# Patient Record
Sex: MALE | Race: WHITE | Employment: OTHER | URBAN - METROPOLITAN AREA
[De-identification: names, ages, dates, MRNs, and addresses within clinical notes are randomized per-mention and may not be internally consistent; named-entity substitution may affect disease eponyms.]

---

## 2017-01-10 ENCOUNTER — TELEPHONE (OUTPATIENT)
Dept: FAMILY MEDICINE CLINIC | Facility: CLINIC | Age: 57
End: 2017-01-10

## 2017-01-10 DIAGNOSIS — E55.9 VITAMIN D DEFICIENCY: Primary | ICD-10-CM

## 2017-01-10 DIAGNOSIS — E78.5 HYPERLIPIDEMIA WITH TARGET LDL LESS THAN 130: ICD-10-CM

## 2017-01-10 DIAGNOSIS — I10 HYPERTENSION, BENIGN ESSENTIAL, GOAL BELOW 140/90: ICD-10-CM

## 2017-01-10 NOTE — TELEPHONE ENCOUNTER
Patient schedule an appointment on 2/27/17, would like labs placed at THE [facility].

## 2017-01-10 NOTE — TELEPHONE ENCOUNTER
Pt has scheduled six month follow up for 2/27/16 for BP. But Pt asked for lab orders. LOV 06/06/16 and labs 06/08/16. What labs would you advise Pt to complete ? Routed to Dr Lor Slade.

## 2017-02-18 ENCOUNTER — APPOINTMENT (OUTPATIENT)
Dept: LAB | Age: 57
End: 2017-02-18
Attending: FAMILY MEDICINE
Payer: COMMERCIAL

## 2017-02-18 DIAGNOSIS — E78.5 HYPERLIPIDEMIA WITH TARGET LDL LESS THAN 130: ICD-10-CM

## 2017-02-18 DIAGNOSIS — I10 HYPERTENSION, BENIGN ESSENTIAL, GOAL BELOW 140/90: ICD-10-CM

## 2017-02-18 DIAGNOSIS — E55.9 VITAMIN D DEFICIENCY: ICD-10-CM

## 2017-02-18 LAB
25-HYDROXYVITAMIN D (TOTAL): 31.1 NG/ML (ref 30–100)
ALBUMIN SERPL-MCNC: 3.7 G/DL (ref 3.5–4.8)
ALP LIVER SERPL-CCNC: 90 U/L (ref 45–117)
ALT SERPL-CCNC: 29 U/L (ref 17–63)
AST SERPL-CCNC: 16 U/L (ref 15–41)
BILIRUB SERPL-MCNC: 0.5 MG/DL (ref 0.1–2)
BUN BLD-MCNC: 18 MG/DL (ref 8–20)
CALCIUM BLD-MCNC: 8.9 MG/DL (ref 8.3–10.3)
CHLORIDE: 105 MMOL/L (ref 101–111)
CHOLEST SMN-MCNC: 181 MG/DL (ref ?–200)
CO2: 26 MMOL/L (ref 22–32)
CREAT BLD-MCNC: 0.88 MG/DL (ref 0.7–1.3)
GLUCOSE BLD-MCNC: 94 MG/DL (ref 70–99)
HDLC SERPL-MCNC: 66 MG/DL (ref 45–?)
HDLC SERPL: 2.74 {RATIO} (ref ?–4.97)
LDLC SERPL CALC-MCNC: 100 MG/DL (ref ?–130)
M PROTEIN MFR SERPL ELPH: 7 G/DL (ref 6.1–8.3)
NONHDLC SERPL-MCNC: 115 MG/DL (ref ?–130)
POTASSIUM SERPL-SCNC: 3.8 MMOL/L (ref 3.6–5.1)
SODIUM SERPL-SCNC: 141 MMOL/L (ref 136–144)
TRIGLYCERIDES: 75 MG/DL (ref ?–150)
VLDL: 15 MG/DL (ref 5–40)

## 2017-02-18 PROCEDURE — 80053 COMPREHEN METABOLIC PANEL: CPT

## 2017-02-18 PROCEDURE — 82306 VITAMIN D 25 HYDROXY: CPT

## 2017-02-18 PROCEDURE — 80061 LIPID PANEL: CPT

## 2017-02-27 ENCOUNTER — OFFICE VISIT (OUTPATIENT)
Dept: FAMILY MEDICINE CLINIC | Facility: CLINIC | Age: 57
End: 2017-02-27

## 2017-02-27 VITALS
SYSTOLIC BLOOD PRESSURE: 126 MMHG | RESPIRATION RATE: 16 BRPM | TEMPERATURE: 98 F | HEART RATE: 72 BPM | BODY MASS INDEX: 34.71 KG/M2 | WEIGHT: 229 LBS | HEIGHT: 68 IN | DIASTOLIC BLOOD PRESSURE: 80 MMHG

## 2017-02-27 DIAGNOSIS — M17.0 PRIMARY OSTEOARTHRITIS OF BOTH KNEES: ICD-10-CM

## 2017-02-27 DIAGNOSIS — I10 HYPERTENSION, BENIGN ESSENTIAL, GOAL BELOW 140/90: ICD-10-CM

## 2017-02-27 DIAGNOSIS — E78.5 HYPERLIPIDEMIA WITH TARGET LDL LESS THAN 130: Primary | ICD-10-CM

## 2017-02-27 PROCEDURE — 99214 OFFICE O/P EST MOD 30 MIN: CPT | Performed by: FAMILY MEDICINE

## 2017-02-28 NOTE — PROGRESS NOTES
HPI:   Patient presents with:  Blood Pressure: 6 month f/u   Hyperlipidemia: 6 month f/u       Selmer Suzan. is a 64year old male who presents for recheck of his hypertension.  He has been taking medications as instructed, with no medication side effec current facility-administered medications on file prior to visit. Past History:   He has Vitamin D deficiency;  Hyperlipidemia with target LDL less than 130; Hypertension, benign essential, goal below 140/90; and Primary osteoarthritis of both knees on hi ordered    Problem List Items Addressed This Visit        Cardiovascular    Hyperlipidemia with target LDL less than 130 - Primary    Overview     LDL  with TLC         Hypertension, benign essential, goal below 140/90    Overview     Lisinopril 10

## 2017-02-28 NOTE — ASSESSMENT & PLAN NOTE
He questions about [place] injections versus other options.   He seen Dr. Dani Elaine in the past, we discussed that it may be appropriate for this since he still having pain

## 2017-05-03 ENCOUNTER — TELEPHONE (OUTPATIENT)
Dept: FAMILY MEDICINE CLINIC | Facility: CLINIC | Age: 57
End: 2017-05-03

## 2017-05-03 DIAGNOSIS — I10 HYPERTENSION, BENIGN ESSENTIAL, GOAL BELOW 140/90: Primary | ICD-10-CM

## 2017-05-03 DIAGNOSIS — Z00.00 LABORATORY EXAMINATION ORDERED AS PART OF A ROUTINE GENERAL MEDICAL EXAMINATION: Primary | ICD-10-CM

## 2017-05-03 RX ORDER — LISINOPRIL 10 MG/1
10 TABLET ORAL DAILY
Qty: 90 TABLET | Refills: 3 | Status: SHIPPED | OUTPATIENT
Start: 2017-05-03 | End: 2017-08-28

## 2017-05-03 NOTE — TELEPHONE ENCOUNTER
Pt scheduled a physical for August and needs blood work order sent to [facility].

## 2017-06-22 ENCOUNTER — HOSPITAL ENCOUNTER (OUTPATIENT)
Age: 57
Discharge: HOME | End: 2017-06-22
Attending: EMERGENCY MEDICINE
Payer: COMMERCIAL

## 2017-06-22 ENCOUNTER — TELEPHONE (OUTPATIENT)
Dept: FAMILY MEDICINE CLINIC | Facility: CLINIC | Age: 57
End: 2017-06-22

## 2017-06-22 VITALS
BODY MASS INDEX: 36 KG/M2 | DIASTOLIC BLOOD PRESSURE: 88 MMHG | WEIGHT: 235 LBS | OXYGEN SATURATION: 96 % | TEMPERATURE: 97 F | RESPIRATION RATE: 16 BRPM | HEART RATE: 86 BPM | SYSTOLIC BLOOD PRESSURE: 144 MMHG

## 2017-06-22 DIAGNOSIS — L03.116 LEFT LEG CELLULITIS: Primary | ICD-10-CM

## 2017-06-22 PROCEDURE — 99203 OFFICE O/P NEW LOW 30 MIN: CPT

## 2017-06-22 PROCEDURE — 99213 OFFICE O/P EST LOW 20 MIN: CPT

## 2017-06-22 RX ORDER — CEFADROXIL 500 MG/1
500 CAPSULE ORAL 2 TIMES DAILY
Qty: 20 CAPSULE | Refills: 0 | Status: SHIPPED | OUTPATIENT
Start: 2017-06-22 | End: 2017-07-02

## 2017-06-22 RX ORDER — IBUPROFEN 600 MG/1
600 TABLET ORAL EVERY 8 HOURS PRN
Qty: 30 TABLET | Refills: 0 | Status: SHIPPED | OUTPATIENT
Start: 2017-06-22 | End: 2017-06-29

## 2017-06-22 NOTE — ED INITIAL ASSESSMENT (HPI)
Pt c/o redness, warmth, swelling and sensitive to touch on left inner thigh. Pt denies any known injury.

## 2017-06-22 NOTE — ED PROVIDER NOTES
Patient Seen in: [facility]    History   Patient presents with:  Cellulitis (integumentary, infectious)    Stated Complaint: red spot on right leg ,tender    HPI    41-year-old male presents to the emergency department comp are as noted in HPI. Constitutional and vital signs reviewed. All other systems reviewed and negative except as noted above. PSFH elements reviewed from today and agreed except as otherwise stated in HPI.     Physical Exam     ED Triage Vitals   BP 0

## 2017-06-22 NOTE — TELEPHONE ENCOUNTER
Talked to patient he has had this red area to inner aspect of thigh for last week no HX of injury area feels red and the tender area starts at knee and extends 9 in getting more intense to touch urged patient to go to Urgent care to be seen today he was re

## 2017-08-23 ENCOUNTER — LABORATORY ENCOUNTER (OUTPATIENT)
Dept: LAB | Age: 57
End: 2017-08-23
Attending: FAMILY MEDICINE
Payer: COMMERCIAL

## 2017-08-23 DIAGNOSIS — Z00.00 LABORATORY EXAMINATION ORDERED AS PART OF A ROUTINE GENERAL MEDICAL EXAMINATION: ICD-10-CM

## 2017-08-23 LAB
ALBUMIN SERPL-MCNC: 3.4 G/DL (ref 3.5–4.8)
ALP LIVER SERPL-CCNC: 83 U/L (ref 45–117)
ALT SERPL-CCNC: 26 U/L (ref 17–63)
AST SERPL-CCNC: 14 U/L (ref 15–41)
BASOPHILS # BLD AUTO: 0.08 X10(3) UL (ref 0–0.1)
BASOPHILS NFR BLD AUTO: 0.8 %
BILIRUB SERPL-MCNC: 0.6 MG/DL (ref 0.1–2)
BUN BLD-MCNC: 20 MG/DL (ref 8–20)
CALCIUM BLD-MCNC: 9.1 MG/DL (ref 8.3–10.3)
CHLORIDE: 103 MMOL/L (ref 101–111)
CHOLEST SMN-MCNC: 185 MG/DL (ref ?–200)
CO2: 27 MMOL/L (ref 22–32)
COMPLEXED PSA SERPL-MCNC: 4.82 NG/ML (ref 0.01–4)
CREAT BLD-MCNC: 1.01 MG/DL (ref 0.7–1.3)
EOSINOPHIL # BLD AUTO: 0.49 X10(3) UL (ref 0–0.3)
EOSINOPHIL NFR BLD AUTO: 4.9 %
ERYTHROCYTE [DISTWIDTH] IN BLOOD BY AUTOMATED COUNT: 14 % (ref 11.5–16)
GLUCOSE BLD-MCNC: 98 MG/DL (ref 70–99)
HCT VFR BLD AUTO: 48.3 % (ref 37–53)
HDLC SERPL-MCNC: 59 MG/DL (ref 45–?)
HDLC SERPL: 3.14 {RATIO} (ref ?–4.97)
HGB BLD-MCNC: 16.2 G/DL (ref 13–17)
IMMATURE GRANULOCYTE COUNT: 0.06 X10(3) UL (ref 0–1)
IMMATURE GRANULOCYTE RATIO %: 0.6 %
LDLC SERPL CALC-MCNC: 101 MG/DL (ref ?–130)
LDLC SERPL-MCNC: 25 MG/DL (ref 5–40)
LYMPHOCYTES # BLD AUTO: 4.07 X10(3) UL (ref 0.9–4)
LYMPHOCYTES NFR BLD AUTO: 40.5 %
M PROTEIN MFR SERPL ELPH: 6.9 G/DL (ref 6.1–8.3)
MCH RBC QN AUTO: 31.3 PG (ref 27–33.2)
MCHC RBC AUTO-ENTMCNC: 33.5 G/DL (ref 31–37)
MCV RBC AUTO: 93.4 FL (ref 80–99)
MONOCYTES # BLD AUTO: 1.11 X10(3) UL (ref 0.1–0.6)
MONOCYTES NFR BLD AUTO: 11 %
NEUTROPHIL ABS PRELIM: 4.25 X10 (3) UL (ref 1.3–6.7)
NEUTROPHILS # BLD AUTO: 4.25 X10(3) UL (ref 1.3–6.7)
NEUTROPHILS NFR BLD AUTO: 42.2 %
NONHDLC SERPL-MCNC: 126 MG/DL (ref ?–130)
PLATELET # BLD AUTO: 297 10(3)UL (ref 150–450)
POTASSIUM SERPL-SCNC: 4.6 MMOL/L (ref 3.6–5.1)
RBC # BLD AUTO: 5.17 X10(6)UL (ref 4.3–5.7)
RED CELL DISTRIBUTION WIDTH-SD: 48.3 FL (ref 35.1–46.3)
SODIUM SERPL-SCNC: 136 MMOL/L (ref 136–144)
TRIGLYCERIDES: 127 MG/DL (ref ?–150)
TSI SER-ACNC: 2.89 MIU/ML (ref 0.35–5.5)
WBC # BLD AUTO: 10.1 X10(3) UL (ref 4–13)

## 2017-08-23 PROCEDURE — 80061 LIPID PANEL: CPT | Performed by: FAMILY MEDICINE

## 2017-08-23 PROCEDURE — 84153 ASSAY OF PSA TOTAL: CPT | Performed by: FAMILY MEDICINE

## 2017-08-23 PROCEDURE — 36415 COLL VENOUS BLD VENIPUNCTURE: CPT | Performed by: FAMILY MEDICINE

## 2017-08-23 PROCEDURE — 80050 GENERAL HEALTH PANEL: CPT | Performed by: FAMILY MEDICINE

## 2017-08-28 ENCOUNTER — OFFICE VISIT (OUTPATIENT)
Dept: FAMILY MEDICINE CLINIC | Facility: CLINIC | Age: 57
End: 2017-08-28

## 2017-08-28 VITALS
HEIGHT: 67.75 IN | RESPIRATION RATE: 16 BRPM | HEART RATE: 80 BPM | SYSTOLIC BLOOD PRESSURE: 110 MMHG | WEIGHT: 233 LBS | BODY MASS INDEX: 35.73 KG/M2 | DIASTOLIC BLOOD PRESSURE: 60 MMHG

## 2017-08-28 DIAGNOSIS — I10 HYPERTENSION, BENIGN ESSENTIAL, GOAL BELOW 140/90: ICD-10-CM

## 2017-08-28 DIAGNOSIS — E78.5 HYPERLIPIDEMIA WITH TARGET LDL LESS THAN 130: ICD-10-CM

## 2017-08-28 DIAGNOSIS — Z00.00 ANNUAL PHYSICAL EXAM: Primary | ICD-10-CM

## 2017-08-28 DIAGNOSIS — R97.20 ELEVATED PSA, LESS THAN 10 NG/ML: ICD-10-CM

## 2017-08-28 PROCEDURE — 99396 PREV VISIT EST AGE 40-64: CPT | Performed by: FAMILY MEDICINE

## 2017-08-28 RX ORDER — LISINOPRIL 10 MG/1
10 TABLET ORAL DAILY
Qty: 90 TABLET | Refills: 3 | Status: SHIPPED | OUTPATIENT
Start: 2017-08-28 | End: 2018-08-23

## 2017-08-28 NOTE — PROGRESS NOTES
Cheyenne Mares. is a 62year old male who presents for a complete physical exam.   HPI:   Patient presents with:  Physical  Lab Results      His last annual assessment has been over 1 year: Annual Physical due on 12/29/2015        Pt complains of OA of l 08:40 AM         Lab Results  Component Value Date/Time   VITD 31.1 02/18/2017 11:54 AM   PSA 0.45 05/06/2011 05:53 PM              Current Outpatient Prescriptions on File Prior to Visit:  Cholecalciferol (VITAMIN D) 1000 UNITS Oral Cap Take 2,000 Int'l U 110/60 (BP Location: Left arm)   Pulse 80   Resp 16   Ht 67.75\"   Wt 233 lb   BMI 35.69 kg/m²  Estimated body mass index is 35.69 kg/m² as calculated from the following:    Height as of this encounter: 67.75\". Weight as of this encounter: 233 lb.    Ph range of motion. Lymphadenopathy:     He has no cervical adenopathy. He has no axillary adenopathy. Neurological: He is alert and oriented to person, place, and time. He has normal strength and normal reflexes.  No cranial nerve deficit or sensory d large prostate, repeat PSA with free level in 1 month, follow up if abnormal, discussed aovodart and options.           Relevant Orders    PSA TOTAL W REFLEX TO FREE      Other Visit Diagnoses     Annual physical exam    -  Primary         Return in about 6

## 2017-08-29 ENCOUNTER — TELEPHONE (OUTPATIENT)
Dept: FAMILY MEDICINE CLINIC | Facility: CLINIC | Age: 57
End: 2017-08-29

## 2017-08-29 NOTE — TELEPHONE ENCOUNTER
Patient had called [facility], Dr. Shabbir Bermudez, yesterday to schedule his colonoscopy and they are unable to get him in until January and patient is wanting to know if we can call to try and get him in this year

## 2017-08-29 NOTE — TELEPHONE ENCOUNTER
Called Dr Lalitha Galan are booking out until Jan of 2018 Dr Phillip Arguello has an appointment for 10/16 and Dr Nancy Smith has an appointment for 10/19.  Called patient no answer no voice mail sent My Chart message

## 2017-08-29 NOTE — TELEPHONE ENCOUNTER
Patient given the below information and is wondering which of the below two doctors Dr. Joe Darby would recommend?     Routed to Dr. Joe Darby

## 2017-08-29 NOTE — ASSESSMENT & PLAN NOTE
Likely BPH with large prostate, repeat PSA with free level in 1 month, follow up if abnormal, discussed aovodart and options.

## 2017-10-07 ENCOUNTER — APPOINTMENT (OUTPATIENT)
Dept: LAB | Age: 57
End: 2017-10-07
Attending: FAMILY MEDICINE
Payer: COMMERCIAL

## 2017-10-07 DIAGNOSIS — R97.20 ELEVATED PSA, LESS THAN 10 NG/ML: ICD-10-CM

## 2017-10-07 PROCEDURE — 84153 ASSAY OF PSA TOTAL: CPT

## 2017-10-07 PROCEDURE — 36415 COLL VENOUS BLD VENIPUNCTURE: CPT

## 2017-10-09 ENCOUNTER — LAB ENCOUNTER (OUTPATIENT)
Dept: LAB | Age: 57
End: 2017-10-09
Attending: FAMILY MEDICINE
Payer: COMMERCIAL

## 2017-10-09 DIAGNOSIS — R97.20 ELEVATED PSA: Primary | ICD-10-CM

## 2017-10-09 PROCEDURE — 36415 COLL VENOUS BLD VENIPUNCTURE: CPT | Performed by: FAMILY MEDICINE

## 2017-10-09 PROCEDURE — 84153 ASSAY OF PSA TOTAL: CPT | Performed by: FAMILY MEDICINE

## 2017-10-16 ENCOUNTER — TELEPHONE (OUTPATIENT)
Dept: FAMILY MEDICINE CLINIC | Facility: CLINIC | Age: 57
End: 2017-10-16

## 2017-10-16 NOTE — TELEPHONE ENCOUNTER
Pt Requesting a call back on his PSA results. Please call him back. Pt concerned on them.  (call before noon)

## 2017-10-16 NOTE — TELEPHONE ENCOUNTER
Called and discussed this with the patient went over his numbers and what they mean and what he can do to help the numbers he will try an ASA 81 mg daily for this and other benefits

## 2017-11-28 DIAGNOSIS — I10 HYPERTENSION, BENIGN ESSENTIAL, GOAL BELOW 140/90: ICD-10-CM

## 2017-11-29 RX ORDER — LISINOPRIL 10 MG/1
TABLET ORAL
Qty: 90 TABLET | Refills: 0 | Status: SHIPPED | OUTPATIENT
Start: 2017-11-29 | End: 2018-02-08

## 2017-11-29 NOTE — TELEPHONE ENCOUNTER
Medication refilled per protocol.        Signed Prescriptions Disp Refills    lisinopril 10 MG Oral Tab 90 tablet 0      Sig: Take 1 tablet by mouth daily        Authorizing Provider: Silvino Watters        Ordering User: Hollie Almanza 8/28/20

## 2018-02-08 ENCOUNTER — TELEPHONE (OUTPATIENT)
Dept: FAMILY MEDICINE CLINIC | Facility: CLINIC | Age: 58
End: 2018-02-08

## 2018-02-08 DIAGNOSIS — Z00.00 LABORATORY EXAMINATION ORDERED AS PART OF A ROUTINE GENERAL MEDICAL EXAMINATION: Primary | ICD-10-CM

## 2018-02-08 DIAGNOSIS — R97.20 ELEVATED PSA, LESS THAN 10 NG/ML: ICD-10-CM

## 2018-02-08 DIAGNOSIS — I10 HYPERTENSION, BENIGN ESSENTIAL, GOAL BELOW 140/90: ICD-10-CM

## 2018-02-08 RX ORDER — LISINOPRIL 10 MG/1
10 TABLET ORAL
Qty: 90 TABLET | Refills: 1 | Status: SHIPPED | OUTPATIENT
Start: 2018-02-08 | End: 2018-03-05

## 2018-02-26 ENCOUNTER — LAB ENCOUNTER (OUTPATIENT)
Dept: LAB | Age: 58
End: 2018-02-26
Attending: FAMILY MEDICINE
Payer: COMMERCIAL

## 2018-02-26 DIAGNOSIS — R97.20 ELEVATED PSA, LESS THAN 10 NG/ML: ICD-10-CM

## 2018-02-26 DIAGNOSIS — Z00.00 LABORATORY EXAMINATION ORDERED AS PART OF A ROUTINE GENERAL MEDICAL EXAMINATION: ICD-10-CM

## 2018-02-26 LAB
25-HYDROXYVITAMIN D (TOTAL): 46.1 NG/ML (ref 30–100)
ALBUMIN SERPL-MCNC: 3.8 G/DL (ref 3.5–4.8)
ALP LIVER SERPL-CCNC: 110 U/L (ref 45–117)
ALT SERPL-CCNC: 25 U/L (ref 17–63)
AST SERPL-CCNC: 14 U/L (ref 15–41)
BASOPHILS # BLD AUTO: 0.07 X10(3) UL (ref 0–0.1)
BASOPHILS NFR BLD AUTO: 0.6 %
BILIRUB SERPL-MCNC: 0.5 MG/DL (ref 0.1–2)
BUN BLD-MCNC: 17 MG/DL (ref 8–20)
CALCIUM BLD-MCNC: 9.4 MG/DL (ref 8.3–10.3)
CHLORIDE: 106 MMOL/L (ref 101–111)
CHOLEST SMN-MCNC: 182 MG/DL (ref ?–200)
CO2: 24 MMOL/L (ref 22–32)
CREAT BLD-MCNC: 0.98 MG/DL (ref 0.7–1.3)
EOSINOPHIL # BLD AUTO: 0.42 X10(3) UL (ref 0–0.3)
EOSINOPHIL NFR BLD AUTO: 3.8 %
ERYTHROCYTE [DISTWIDTH] IN BLOOD BY AUTOMATED COUNT: 14 % (ref 11.5–16)
GLUCOSE BLD-MCNC: 93 MG/DL (ref 70–99)
HCT VFR BLD AUTO: 48.1 % (ref 37–53)
HDLC SERPL-MCNC: 48 MG/DL (ref 45–?)
HDLC SERPL: 3.79 {RATIO} (ref ?–4.97)
HEPATITIS C VIRUS AB INTERPRETATION: NONREACTIVE
HGB BLD-MCNC: 15.9 G/DL (ref 13–17)
IMMATURE GRANULOCYTE COUNT: 0.03 X10(3) UL (ref 0–1)
IMMATURE GRANULOCYTE RATIO %: 0.3 %
LDLC SERPL CALC-MCNC: 99 MG/DL (ref ?–130)
LYMPHOCYTES # BLD AUTO: 2.31 X10(3) UL (ref 0.9–4)
LYMPHOCYTES NFR BLD AUTO: 20.7 %
M PROTEIN MFR SERPL ELPH: 7.2 G/DL (ref 6.1–8.3)
MCH RBC QN AUTO: 31.2 PG (ref 27–33.2)
MCHC RBC AUTO-ENTMCNC: 33.1 G/DL (ref 31–37)
MCV RBC AUTO: 94.3 FL (ref 80–99)
MONOCYTES # BLD AUTO: 0.98 X10(3) UL (ref 0.1–1)
MONOCYTES NFR BLD AUTO: 8.8 %
NEUTROPHIL ABS PRELIM: 7.34 X10 (3) UL (ref 1.3–6.7)
NEUTROPHILS # BLD AUTO: 7.34 X10(3) UL (ref 1.3–6.7)
NEUTROPHILS NFR BLD AUTO: 65.8 %
NONHDLC SERPL-MCNC: 134 MG/DL (ref ?–130)
PLATELET # BLD AUTO: 238 10(3)UL (ref 150–450)
POTASSIUM SERPL-SCNC: 4.1 MMOL/L (ref 3.6–5.1)
PSA SERPL-MCNC: 1.61 NG/ML (ref 0.01–4)
RBC # BLD AUTO: 5.1 X10(6)UL (ref 4.3–5.7)
RED CELL DISTRIBUTION WIDTH-SD: 48.7 FL (ref 35.1–46.3)
SODIUM SERPL-SCNC: 140 MMOL/L (ref 136–144)
TRIGL SERPL-MCNC: 173 MG/DL (ref ?–150)
TSI SER-ACNC: 3.39 MIU/ML (ref 0.35–5.5)
VLDLC SERPL CALC-MCNC: 35 MG/DL (ref 5–40)
WBC # BLD AUTO: 11.2 X10(3) UL (ref 4–13)

## 2018-02-26 PROCEDURE — 82306 VITAMIN D 25 HYDROXY: CPT | Performed by: FAMILY MEDICINE

## 2018-02-26 PROCEDURE — 80061 LIPID PANEL: CPT | Performed by: FAMILY MEDICINE

## 2018-02-26 PROCEDURE — 80050 GENERAL HEALTH PANEL: CPT | Performed by: FAMILY MEDICINE

## 2018-02-26 PROCEDURE — 86803 HEPATITIS C AB TEST: CPT | Performed by: FAMILY MEDICINE

## 2018-02-26 PROCEDURE — 84153 ASSAY OF PSA TOTAL: CPT | Performed by: FAMILY MEDICINE

## 2018-03-05 ENCOUNTER — OFFICE VISIT (OUTPATIENT)
Dept: FAMILY MEDICINE CLINIC | Facility: CLINIC | Age: 58
End: 2018-03-05

## 2018-03-05 ENCOUNTER — TELEPHONE (OUTPATIENT)
Dept: FAMILY MEDICINE CLINIC | Facility: CLINIC | Age: 58
End: 2018-03-05

## 2018-03-05 VITALS
HEART RATE: 76 BPM | RESPIRATION RATE: 14 BRPM | DIASTOLIC BLOOD PRESSURE: 80 MMHG | SYSTOLIC BLOOD PRESSURE: 124 MMHG | TEMPERATURE: 97 F | WEIGHT: 235 LBS | BODY MASS INDEX: 34.8 KG/M2 | HEIGHT: 69 IN

## 2018-03-05 DIAGNOSIS — E78.5 HYPERLIPIDEMIA WITH TARGET LDL LESS THAN 130: Primary | ICD-10-CM

## 2018-03-05 DIAGNOSIS — Z12.11 SCREENING FOR COLON CANCER: ICD-10-CM

## 2018-03-05 DIAGNOSIS — R97.20 ELEVATED PSA, LESS THAN 10 NG/ML: ICD-10-CM

## 2018-03-05 DIAGNOSIS — I10 HYPERTENSION, BENIGN ESSENTIAL, GOAL BELOW 140/90: ICD-10-CM

## 2018-03-05 PROCEDURE — 99214 OFFICE O/P EST MOD 30 MIN: CPT | Performed by: FAMILY MEDICINE

## 2018-03-05 RX ORDER — PYRIDOXINE HCL (VITAMIN B6) 100 MG
TABLET ORAL
COMMUNITY
End: 2019-03-11

## 2018-03-05 RX ORDER — ASPIRIN 81 MG/1
81 TABLET ORAL DAILY
Qty: 30 TABLET | Refills: 11 | COMMUNITY
Start: 2018-03-05 | End: 2021-10-18

## 2018-03-05 NOTE — PROGRESS NOTES
HPI:   Genevieve Kinney. is a 62year old male who presents for recheck of his pre-diabetes. PSA better. Off work on PT, and gradually doing better.    Had colonoscopy 10.2017- on 3 year program.     Now some lower back pain, gassy, no cosntipation, no weak mg/dL   HPI     Past History:   He  has a past medical history of Essential hypertension; Plantar fasciitis; and Rotator cuff syndrome, left (11/30/2015).    His family history includes Asthma in his mother; Diabetes in his mother; Heart Attack in his mater Posterior tibial pulses are 2+ on the right side, and 2+ on the left side. Edema not present. Pulmonary/Chest: Effort normal and breath sounds normal. No respiratory distress. He has no wheezes. Abdominal: Soft.  Bowel sounds are normal. There is n Visit Diagnoses     Screening for colon cancer        Relevant Orders    CBC WITH DIFFERENTIAL WITH PLATELET    COMP METABOLIC PANEL (14)    LIPID PANEL         Return in about 6 months (around 9/5/2018) for Annual physical.    [place] Patient, 3/5/2018, 4:03

## 2018-03-05 NOTE — ASSESSMENT & PLAN NOTE
Much better, may be related to sitting in work truck, and he has been off work this last 7 months. Will follow in 1 year.

## 2018-05-16 DIAGNOSIS — I10 HYPERTENSION, BENIGN ESSENTIAL, GOAL BELOW 140/90: ICD-10-CM

## 2018-05-16 RX ORDER — LISINOPRIL 10 MG/1
TABLET ORAL
Qty: 90 TABLET | Refills: 0 | Status: SHIPPED | OUTPATIENT
Start: 2018-05-16 | End: 2018-08-07

## 2018-05-16 NOTE — TELEPHONE ENCOUNTER
Medication refilled per protocol.        Signed Prescriptions Disp Refills    LISINOPRIL 10 MG Oral Tab 90 tablet 0      Sig: TAKE 1 TABLET BY MOUTH  DAILY        Authorizing Provider: Titus Russo        Ordering User: Yefri Vazquez 3/5/20

## 2018-08-07 ENCOUNTER — TELEPHONE (OUTPATIENT)
Dept: FAMILY MEDICINE CLINIC | Facility: CLINIC | Age: 58
End: 2018-08-07

## 2018-08-07 DIAGNOSIS — I10 HYPERTENSION, BENIGN ESSENTIAL, GOAL BELOW 140/90: ICD-10-CM

## 2018-08-07 RX ORDER — LISINOPRIL 10 MG/1
10 TABLET ORAL
Qty: 90 TABLET | Refills: 0 | Status: SHIPPED | OUTPATIENT
Start: 2018-08-07 | End: 2018-09-10

## 2018-08-07 NOTE — TELEPHONE ENCOUNTER
Pt calling they have changed their pharmacy to mail [facility] l[facility]ril 10 MG Oral Tab he needs that [facility].

## 2018-09-04 ENCOUNTER — LAB ENCOUNTER (OUTPATIENT)
Dept: LAB | Age: 58
End: 2018-09-04
Attending: FAMILY MEDICINE
Payer: COMMERCIAL

## 2018-09-04 DIAGNOSIS — Z12.11 SCREENING FOR COLON CANCER: ICD-10-CM

## 2018-09-04 LAB
ALBUMIN SERPL-MCNC: 3.7 G/DL (ref 3.5–4.8)
ALBUMIN/GLOB SERPL: 1 {RATIO} (ref 1–2)
ALP LIVER SERPL-CCNC: 94 U/L (ref 45–117)
ALT SERPL-CCNC: 25 U/L (ref 17–63)
ANION GAP SERPL CALC-SCNC: 7 MMOL/L (ref 0–18)
AST SERPL-CCNC: 17 U/L (ref 15–41)
BASOPHILS # BLD AUTO: 0.09 X10(3) UL (ref 0–0.1)
BASOPHILS NFR BLD AUTO: 1.1 %
BILIRUB SERPL-MCNC: 0.6 MG/DL (ref 0.1–2)
BUN BLD-MCNC: 20 MG/DL (ref 8–20)
BUN/CREAT SERPL: 19.8 (ref 10–20)
CALCIUM BLD-MCNC: 9.1 MG/DL (ref 8.3–10.3)
CHLORIDE SERPL-SCNC: 106 MMOL/L (ref 101–111)
CHOLEST SMN-MCNC: 186 MG/DL (ref ?–200)
CO2 SERPL-SCNC: 24 MMOL/L (ref 22–32)
CREAT BLD-MCNC: 1.01 MG/DL (ref 0.7–1.3)
EOSINOPHIL # BLD AUTO: 0.44 X10(3) UL (ref 0–0.3)
EOSINOPHIL NFR BLD AUTO: 5.4 %
ERYTHROCYTE [DISTWIDTH] IN BLOOD BY AUTOMATED COUNT: 14 % (ref 11.5–16)
GLOBULIN PLAS-MCNC: 3.6 G/DL (ref 2.5–4)
GLUCOSE BLD-MCNC: 97 MG/DL (ref 70–99)
HCT VFR BLD AUTO: 47.7 % (ref 37–53)
HDLC SERPL-MCNC: 49 MG/DL (ref 40–59)
HGB BLD-MCNC: 15.5 G/DL (ref 13–17)
IMMATURE GRANULOCYTE COUNT: 0.02 X10(3) UL (ref 0–1)
IMMATURE GRANULOCYTE RATIO %: 0.2 %
LDLC SERPL CALC-MCNC: 109 MG/DL (ref ?–100)
LYMPHOCYTES # BLD AUTO: 3.18 X10(3) UL (ref 0.9–4)
LYMPHOCYTES NFR BLD AUTO: 39.2 %
M PROTEIN MFR SERPL ELPH: 7.3 G/DL (ref 6.1–8.3)
MCH RBC QN AUTO: 30.6 PG (ref 27–33.2)
MCHC RBC AUTO-ENTMCNC: 32.5 G/DL (ref 31–37)
MCV RBC AUTO: 94.1 FL (ref 80–99)
MONOCYTES # BLD AUTO: 0.88 X10(3) UL (ref 0.1–1)
MONOCYTES NFR BLD AUTO: 10.8 %
NEUTROPHIL ABS PRELIM: 3.51 X10 (3) UL (ref 1.3–6.7)
NEUTROPHILS # BLD AUTO: 3.51 X10(3) UL (ref 1.3–6.7)
NEUTROPHILS NFR BLD AUTO: 43.3 %
NONHDLC SERPL-MCNC: 137 MG/DL (ref ?–130)
OSMOLALITY SERPL CALC.SUM OF ELEC: 287 MOSM/KG (ref 275–295)
PLATELET # BLD AUTO: 255 10(3)UL (ref 150–450)
POTASSIUM SERPL-SCNC: 4.6 MMOL/L (ref 3.6–5.1)
RBC # BLD AUTO: 5.07 X10(6)UL (ref 4.3–5.7)
RED CELL DISTRIBUTION WIDTH-SD: 48.6 FL (ref 35.1–46.3)
SODIUM SERPL-SCNC: 137 MMOL/L (ref 136–144)
TRIGL SERPL-MCNC: 138 MG/DL (ref 30–149)
VLDLC SERPL CALC-MCNC: 28 MG/DL (ref 0–30)
WBC # BLD AUTO: 8.1 X10(3) UL (ref 4–13)

## 2018-09-04 PROCEDURE — 80061 LIPID PANEL: CPT

## 2018-09-04 PROCEDURE — 80053 COMPREHEN METABOLIC PANEL: CPT

## 2018-09-04 PROCEDURE — 85025 COMPLETE CBC W/AUTO DIFF WBC: CPT

## 2018-09-10 ENCOUNTER — OFFICE VISIT (OUTPATIENT)
Dept: FAMILY MEDICINE CLINIC | Facility: CLINIC | Age: 58
End: 2018-09-10
Payer: COMMERCIAL

## 2018-09-10 VITALS
HEIGHT: 68 IN | WEIGHT: 235 LBS | BODY MASS INDEX: 35.61 KG/M2 | RESPIRATION RATE: 20 BRPM | HEART RATE: 76 BPM | DIASTOLIC BLOOD PRESSURE: 68 MMHG | TEMPERATURE: 99 F | SYSTOLIC BLOOD PRESSURE: 102 MMHG

## 2018-09-10 DIAGNOSIS — Z00.00 ANNUAL PHYSICAL EXAM: Primary | ICD-10-CM

## 2018-09-10 DIAGNOSIS — E78.5 HYPERLIPIDEMIA WITH TARGET LDL LESS THAN 130: ICD-10-CM

## 2018-09-10 DIAGNOSIS — I10 ESSENTIAL HYPERTENSION: ICD-10-CM

## 2018-09-10 PROCEDURE — 90715 TDAP VACCINE 7 YRS/> IM: CPT | Performed by: FAMILY MEDICINE

## 2018-09-10 PROCEDURE — 90471 IMMUNIZATION ADMIN: CPT | Performed by: FAMILY MEDICINE

## 2018-09-10 PROCEDURE — 99396 PREV VISIT EST AGE 40-64: CPT | Performed by: FAMILY MEDICINE

## 2018-09-10 RX ORDER — LISINOPRIL 10 MG/1
10 TABLET ORAL
Qty: 90 TABLET | Refills: 3 | Status: SHIPPED | OUTPATIENT
Start: 2018-09-10 | End: 2018-11-01

## 2018-09-10 NOTE — PROGRESS NOTES
Tay Brizuela. is a 62year old male who presents for a complete physical exam. Officially retired 7/2018, but lots of ankle and knee pain. HPI:   Patient presents with:  Physical    Severe foot pain, seeing Dr Ramiro Gallegos, source of shelter.    His last AM    HDL 49 09/04/2018 10:48 AM    TRIG 138 09/04/2018 10:48 AM     (H) 09/04/2018 10:48 AM    NONHDLC 137 (H) 09/04/2018 10:48 AM       Lab Results   Component Value Date/Time    VITD 46.1 02/26/2018 12:31 PM    PSA 1.61 02/26/2018 12:31 PM pain.  No N/V/D/C  :  No dysuria  MS:  No joint pain or swelling  NEURO:  Denies numbness or tingling  PSYCH:  No mood concerns or anxiety     EXAM:   /68 (BP Location: Left arm)   Pulse 76   Temp 98.6 °F (37 °C) (Oral)   Resp 20   Ht 68\"   Wt 235 displays normal reflexes. No cranial nerve deficit. Gait normal.   Skin: Skin is warm and intact. No rash noted. He is not diaphoretic. No pallor. Psychiatric: He has a normal mood and affect.  His behavior is normal. Judgment and thought content normal. janine.     Arsenio Guardado MD, 9/10/2018, 6:59 PM

## 2018-09-24 ENCOUNTER — TELEPHONE (OUTPATIENT)
Dept: FAMILY MEDICINE CLINIC | Facility: CLINIC | Age: 58
End: 2018-09-24

## 2018-09-24 NOTE — TELEPHONE ENCOUNTER
Called and talked to patient he will not discuss his questions with anyone but Dr Gautam Pimentel I did tell him that I did not know when Dr Gautam Pimentel will get back to him.  I will send the message to Dr Gautam Pimentel

## 2018-09-24 NOTE — TELEPHONE ENCOUNTER
Patient requesting to speak to Dr. Jaspreet Gale. Patient has personal questions he prefers to go over with Dr. Jaspreet Gale regarding his most recent office visit.

## 2018-10-17 DIAGNOSIS — I10 HYPERTENSION, BENIGN ESSENTIAL, GOAL BELOW 140/90: ICD-10-CM

## 2018-10-18 RX ORDER — LISINOPRIL 10 MG/1
TABLET ORAL
Qty: 90 TABLET | Refills: 0 | Status: SHIPPED | OUTPATIENT
Start: 2018-10-18 | End: 2018-11-01 | Stop reason: CLARIF

## 2018-11-01 ENCOUNTER — TELEPHONE (OUTPATIENT)
Dept: FAMILY MEDICINE CLINIC | Facility: CLINIC | Age: 58
End: 2018-11-01

## 2018-11-01 DIAGNOSIS — I10 ESSENTIAL HYPERTENSION: ICD-10-CM

## 2018-11-01 RX ORDER — LISINOPRIL 10 MG/1
10 TABLET ORAL
Qty: 90 TABLET | Refills: 3 | Status: SHIPPED | OUTPATIENT
Start: 2018-11-01 | End: 2019-09-03

## 2018-11-01 NOTE — TELEPHONE ENCOUNTER
Spoke with both Mr Gary Moncada and [facility] rep, requesting Lisinopril 10mgs refill  Reviewed Epic, Patient LOV, physical with Dr Zenovia Cooks on 09/10/2018, when Dr Zenovia Cooks issued refill for #90 w/3refills to [place]  Patient stated that he did not pick-up that

## 2018-11-01 NOTE — TELEPHONE ENCOUNTER
[facility] is calling on behalf of patient (who is also on the line) in regards to LISINOPRIL 10 MG Oral Tab refill. States he is having issues getting medication refilled.

## 2018-11-01 NOTE — TELEPHONE ENCOUNTER
Sent a [employer] message to patient to confirm the status with [facility] for the Lisinopril 10mgs.

## 2019-01-04 ENCOUNTER — TELEPHONE (OUTPATIENT)
Dept: FAMILY MEDICINE CLINIC | Facility: CLINIC | Age: 59
End: 2019-01-04

## 2019-01-04 NOTE — TELEPHONE ENCOUNTER
Pt is requesting more than 30 days be called into [facility] for his Lisinopril 10 Mg. He likes a 90 day supply if possible. [facility] asked him to call us.

## 2019-01-04 NOTE — TELEPHONE ENCOUNTER
Called and talked to patient [facility] and they told him he had no refills left but we had refilled this on 11/1/2018 for 90 +3 I called [facility] and they did have this RX and it will ship after 1/11/2019 I notified the patient of th

## 2019-02-11 ENCOUNTER — OFFICE VISIT (OUTPATIENT)
Dept: FAMILY MEDICINE CLINIC | Facility: CLINIC | Age: 59
End: 2019-02-11
Payer: COMMERCIAL

## 2019-02-11 ENCOUNTER — TELEPHONE (OUTPATIENT)
Dept: FAMILY MEDICINE CLINIC | Facility: CLINIC | Age: 59
End: 2019-02-11

## 2019-02-11 VITALS
HEART RATE: 80 BPM | WEIGHT: 241 LBS | HEIGHT: 68 IN | TEMPERATURE: 98 F | RESPIRATION RATE: 12 BRPM | BODY MASS INDEX: 36.53 KG/M2 | DIASTOLIC BLOOD PRESSURE: 60 MMHG | SYSTOLIC BLOOD PRESSURE: 108 MMHG

## 2019-02-11 DIAGNOSIS — R73.9 HYPERGLYCEMIA: ICD-10-CM

## 2019-02-11 DIAGNOSIS — B37.41 CANDIDAL URETHRITIS: Primary | ICD-10-CM

## 2019-02-11 DIAGNOSIS — R97.20 ELEVATED PSA, LESS THAN 10 NG/ML: ICD-10-CM

## 2019-02-11 DIAGNOSIS — Z00.00 LABORATORY EXAMINATION ORDERED AS PART OF A ROUTINE GENERAL MEDICAL EXAMINATION: ICD-10-CM

## 2019-02-11 DIAGNOSIS — Z12.5 SCREENING PSA (PROSTATE SPECIFIC ANTIGEN): ICD-10-CM

## 2019-02-11 DIAGNOSIS — E78.5 HYPERLIPIDEMIA WITH TARGET LDL LESS THAN 130: ICD-10-CM

## 2019-02-11 PROCEDURE — 99213 OFFICE O/P EST LOW 20 MIN: CPT | Performed by: FAMILY MEDICINE

## 2019-02-11 RX ORDER — NYSTATIN 100000 U/G
1 CREAM TOPICAL 2 TIMES DAILY
Qty: 30 G | Refills: 1 | Status: SHIPPED | OUTPATIENT
Start: 2019-02-11 | End: 2019-03-11 | Stop reason: ALTCHOICE

## 2019-02-11 NOTE — TELEPHONE ENCOUNTER
C/o urinating in angled stream without discomfort in the last few days force of stream decreased and when he tries to force stream it hurts at the end of the stream voiding some nights every 3-4 hours he feels that the end of the meatus is tender and disco

## 2019-02-12 NOTE — PROGRESS NOTES
Patient presents with:  Slow Urine Flow: Pt states flow sometimes goes to the side and flow seems slower/less and opening seems smaller  Moles: R lower leg      Subjective   HPI:   This is a 62year old male coming in for lesion on leg, doing ok, see pictu Genitourinary: Testes normal. Cremasteric reflex is present. Right testis shows no mass. Left testis shows no mass. Uncircumcised. Penile erythema present. No phimosis, paraphimosis or penile tenderness. No discharge found.    Genitourinary Comments: Mild u

## 2019-03-04 ENCOUNTER — LAB ENCOUNTER (OUTPATIENT)
Dept: LAB | Age: 59
End: 2019-03-04
Attending: FAMILY MEDICINE
Payer: COMMERCIAL

## 2019-03-04 DIAGNOSIS — Z00.00 LABORATORY EXAMINATION ORDERED AS PART OF A ROUTINE GENERAL MEDICAL EXAMINATION: ICD-10-CM

## 2019-03-04 DIAGNOSIS — E78.5 HYPERLIPIDEMIA WITH TARGET LDL LESS THAN 130: ICD-10-CM

## 2019-03-04 DIAGNOSIS — R97.20 ELEVATED PSA, LESS THAN 10 NG/ML: ICD-10-CM

## 2019-03-04 DIAGNOSIS — R73.9 HYPERGLYCEMIA: ICD-10-CM

## 2019-03-04 DIAGNOSIS — I10 ESSENTIAL HYPERTENSION: ICD-10-CM

## 2019-03-04 LAB
ALBUMIN SERPL-MCNC: 3.7 G/DL (ref 3.4–5)
ALBUMIN/GLOB SERPL: 1.1 {RATIO} (ref 1–2)
ALP LIVER SERPL-CCNC: 99 U/L (ref 45–117)
ALT SERPL-CCNC: 30 U/L (ref 16–61)
ANION GAP SERPL CALC-SCNC: 6 MMOL/L (ref 0–18)
AST SERPL-CCNC: 21 U/L (ref 15–37)
BILIRUB SERPL-MCNC: 0.5 MG/DL (ref 0.1–2)
BUN BLD-MCNC: 15 MG/DL (ref 7–18)
BUN/CREAT SERPL: 15 (ref 10–20)
CALCIUM BLD-MCNC: 9.3 MG/DL (ref 8.5–10.1)
CHLORIDE SERPL-SCNC: 104 MMOL/L (ref 98–107)
CHOLEST SMN-MCNC: 198 MG/DL (ref ?–200)
CO2 SERPL-SCNC: 28 MMOL/L (ref 21–32)
COMPLEXED PSA SERPL-MCNC: 1.14 NG/ML (ref ?–4)
CREAT BLD-MCNC: 1 MG/DL (ref 0.7–1.3)
DEPRECATED RDW RBC AUTO: 49 FL (ref 35.1–46.3)
ERYTHROCYTE [DISTWIDTH] IN BLOOD BY AUTOMATED COUNT: 14.1 % (ref 11–15)
EST. AVERAGE GLUCOSE BLD GHB EST-MCNC: 117 MG/DL (ref 68–126)
GLOBULIN PLAS-MCNC: 3.4 G/DL (ref 2.8–4.4)
GLUCOSE BLD-MCNC: 99 MG/DL (ref 70–99)
HBA1C MFR BLD HPLC: 5.7 % (ref ?–5.7)
HCT VFR BLD AUTO: 47.8 % (ref 39–53)
HDLC SERPL-MCNC: 53 MG/DL (ref 40–59)
HGB BLD-MCNC: 16.1 G/DL (ref 13–17.5)
LDLC SERPL CALC-MCNC: 116 MG/DL (ref ?–100)
M PROTEIN MFR SERPL ELPH: 7.1 G/DL (ref 6.4–8.2)
MCH RBC QN AUTO: 31.6 PG (ref 26–34)
MCHC RBC AUTO-ENTMCNC: 33.7 G/DL (ref 31–37)
MCV RBC AUTO: 93.9 FL (ref 80–100)
NONHDLC SERPL-MCNC: 145 MG/DL (ref ?–130)
OSMOLALITY SERPL CALC.SUM OF ELEC: 287 MOSM/KG (ref 275–295)
PLATELET # BLD AUTO: 243 10(3)UL (ref 150–450)
POTASSIUM SERPL-SCNC: 4.3 MMOL/L (ref 3.5–5.1)
RBC # BLD AUTO: 5.09 X10(6)UL (ref 4.3–5.7)
SODIUM SERPL-SCNC: 138 MMOL/L (ref 136–145)
T4 FREE SERPL-MCNC: 1 NG/DL (ref 0.8–1.7)
TRIGL SERPL-MCNC: 146 MG/DL (ref 30–149)
TSI SER-ACNC: 4.02 MIU/ML (ref 0.36–3.74)
VLDLC SERPL CALC-MCNC: 29 MG/DL (ref 0–30)
WBC # BLD AUTO: 8.5 X10(3) UL (ref 4–11)

## 2019-03-04 PROCEDURE — 83036 HEMOGLOBIN GLYCOSYLATED A1C: CPT

## 2019-03-04 PROCEDURE — 85027 COMPLETE CBC AUTOMATED: CPT

## 2019-03-04 PROCEDURE — 84439 ASSAY OF FREE THYROXINE: CPT

## 2019-03-04 PROCEDURE — 84443 ASSAY THYROID STIM HORMONE: CPT

## 2019-03-04 PROCEDURE — 80053 COMPREHEN METABOLIC PANEL: CPT

## 2019-03-04 PROCEDURE — 80061 LIPID PANEL: CPT

## 2019-03-11 ENCOUNTER — OFFICE VISIT (OUTPATIENT)
Dept: FAMILY MEDICINE CLINIC | Facility: CLINIC | Age: 59
End: 2019-03-11
Payer: COMMERCIAL

## 2019-03-11 VITALS
BODY MASS INDEX: 36.22 KG/M2 | DIASTOLIC BLOOD PRESSURE: 60 MMHG | HEART RATE: 84 BPM | TEMPERATURE: 98 F | SYSTOLIC BLOOD PRESSURE: 112 MMHG | HEIGHT: 68 IN | WEIGHT: 239 LBS | RESPIRATION RATE: 20 BRPM

## 2019-03-11 DIAGNOSIS — M19.071 ARTHRITIS OF BOTH ANKLES: ICD-10-CM

## 2019-03-11 DIAGNOSIS — R73.9 HYPERGLYCEMIA: Chronic | ICD-10-CM

## 2019-03-11 DIAGNOSIS — M17.0 PRIMARY OSTEOARTHRITIS OF BOTH KNEES: ICD-10-CM

## 2019-03-11 DIAGNOSIS — E78.5 HYPERLIPIDEMIA WITH TARGET LDL LESS THAN 130: ICD-10-CM

## 2019-03-11 DIAGNOSIS — M19.072 ARTHRITIS OF BOTH ANKLES: ICD-10-CM

## 2019-03-11 DIAGNOSIS — I10 ESSENTIAL HYPERTENSION: Primary | ICD-10-CM

## 2019-03-11 PROCEDURE — 99214 OFFICE O/P EST MOD 30 MIN: CPT | Performed by: FAMILY MEDICINE

## 2019-03-11 RX ORDER — CALCIUM CARBONATE/VITAMIN D3 600 MG-10
1 TABLET ORAL DAILY
COMMUNITY

## 2019-03-11 NOTE — PROGRESS NOTES
HPI:   Oscar Melchor. is a 62year old male who presents for recheck of his Pre-diabetes. We have been following this elevated Blood sugars and patieint is doing doing better, urination better.  .    Patient presents with:  Blood Pressure    Subjective to person, place, and time. He appears well-developed and well-nourished. No distress. HENT:   Head: Normocephalic. Neck: Normal range of motion. Cardiovascular: Normal rate, regular rhythm, S1 normal, S2 normal and normal heart sounds.     No murmur & Plan     Stable, Continue present management.     Blood Pressure and Cardiac Medications          lisinopril 10 MG Oral Tab                    Musculoskeletal    Primary osteoarthritis of both knees    Overview     Progressively worse pain, prevented him

## 2019-03-12 ENCOUNTER — TELEPHONE (OUTPATIENT)
Dept: FAMILY MEDICINE CLINIC | Facility: CLINIC | Age: 59
End: 2019-03-12

## 2019-03-12 NOTE — ASSESSMENT & PLAN NOTE
Pain continues to be persistent.   He has decreased activities due to the pain is better toleration of activities now that he is no longer constantly going up and down ladders, he is unable to work at his highly active lifestyle due to these knee pains

## 2019-03-13 NOTE — TELEPHONE ENCOUNTER
Entered telephone encounter for wrong patient: Patient is requesting letter for social security disability due to him having to retire. Patient would like note written for [place] at [address]. Main telephone # [phone] # [phone]. [facility]. Patient would like letter mailed to his  and himself at his home address.

## 2019-04-02 ENCOUNTER — TELEPHONE (OUTPATIENT)
Dept: FAMILY MEDICINE CLINIC | Facility: CLINIC | Age: 59
End: 2019-04-02

## 2019-04-02 NOTE — TELEPHONE ENCOUNTER
Received medical records request from patient requesting medical records from 03/11/19 be faxed to [address].  Records were printed and faxed to [address] at [phone]

## 2019-04-22 ENCOUNTER — TELEPHONE (OUTPATIENT)
Dept: FAMILY MEDICINE CLINIC | Facility: CLINIC | Age: 59
End: 2019-04-22

## 2019-04-22 RX ORDER — IBUPROFEN 600 MG/1
600 TABLET ORAL EVERY 8 HOURS PRN
Qty: 90 TABLET | Refills: 3 | Status: SHIPPED | OUTPATIENT
Start: 2019-04-22 | End: 2020-06-08

## 2019-04-22 NOTE — TELEPHONE ENCOUNTER
Pt is requesting for us to call in Ibuprofen 600 mg tab for ongoing Knee pain (Osteoarthritis he has) Was informed to call pharmacy but he states that Dr Mai Hernández prescribed this med. ..he did call them and an appt is required and now wants Dr Falguni Taylor to prescri

## 2019-04-22 NOTE — TELEPHONE ENCOUNTER
This med not on protocol LOV 3/11/2019 with Dr Solano Severe patients Ortho won't refill without an appointment so he wants Dr Alexis Conn to refill this.

## 2019-05-23 ENCOUNTER — TELEPHONE (OUTPATIENT)
Dept: FAMILY MEDICINE CLINIC | Facility: CLINIC | Age: 59
End: 2019-05-23

## 2019-05-23 NOTE — TELEPHONE ENCOUNTER
Please call pt and assist him in scheduling appt to have paperwork completed. Routed to front staff.

## 2019-05-23 NOTE — TELEPHONE ENCOUNTER
Called patient to schedule appointment and he stated he was just seen 03/11/19 and he advised Dr. Samantha Geronimo that these forms would be coming.  Patient understands our process of needing an appointment to complete forms but states these forms are pretty simple an

## 2019-05-24 NOTE — TELEPHONE ENCOUNTER
Are you able to complete disability forms? Pt did not want to schedule appt. Form placed in Dr Krista Vela.

## 2019-05-28 NOTE — TELEPHONE ENCOUNTER
Called patient and let him know paperwork was completed and ready to be picked up.  Form in drawer at , copy sent to scan and placed in tickler

## 2019-06-14 ENCOUNTER — TELEPHONE (OUTPATIENT)
Dept: FAMILY MEDICINE CLINIC | Facility: CLINIC | Age: 59
End: 2019-06-14

## 2019-06-14 DIAGNOSIS — Z00.00 LABORATORY EXAMINATION ORDERED AS PART OF A ROUTINE GENERAL MEDICAL EXAMINATION: ICD-10-CM

## 2019-06-14 DIAGNOSIS — R73.9 HYPERGLYCEMIA: ICD-10-CM

## 2019-06-14 NOTE — TELEPHONE ENCOUNTER
Please enter lab orders for the patient's upcoming physical appointment. Physical scheduled:    Your appointments     Date & Time Appointment Department [facility])    Sep 11, 2019 10:00 AM CDT Physical - Established Patient with MD Lore Cardozo

## 2019-09-03 ENCOUNTER — TELEPHONE (OUTPATIENT)
Dept: FAMILY MEDICINE CLINIC | Facility: CLINIC | Age: 59
End: 2019-09-03

## 2019-09-03 DIAGNOSIS — I10 ESSENTIAL HYPERTENSION: ICD-10-CM

## 2019-09-03 RX ORDER — LISINOPRIL 10 MG/1
10 TABLET ORAL
Qty: 90 TABLET | Refills: 0 | Status: SHIPPED | OUTPATIENT
Start: 2019-09-03 | End: 2019-10-07

## 2019-09-03 NOTE — TELEPHONE ENCOUNTER
Patient is calling requesting a refill on lisinopril 10 MG Oral Tab for 90 days sent to [facility] in [address]     Patient has less than a week left on medication and he [facility] auto refill scheduled until af

## 2019-09-05 ENCOUNTER — APPOINTMENT (OUTPATIENT)
Dept: LAB | Age: 59
End: 2019-09-05
Attending: FAMILY MEDICINE
Payer: COMMERCIAL

## 2019-09-05 LAB
ALBUMIN SERPL-MCNC: 4.1 G/DL (ref 3.4–5)
ALBUMIN/GLOB SERPL: 1.2 {RATIO} (ref 1–2)
ALP LIVER SERPL-CCNC: 101 U/L (ref 45–117)
ALT SERPL-CCNC: 38 U/L (ref 16–61)
ANION GAP SERPL CALC-SCNC: 4 MMOL/L (ref 0–18)
AST SERPL-CCNC: 19 U/L (ref 15–37)
BILIRUB SERPL-MCNC: 0.8 MG/DL (ref 0.1–2)
BUN BLD-MCNC: 19 MG/DL (ref 7–18)
BUN/CREAT SERPL: 18.4 (ref 10–20)
CALCIUM BLD-MCNC: 9.8 MG/DL (ref 8.5–10.1)
CHLORIDE SERPL-SCNC: 105 MMOL/L (ref 98–112)
CHOLEST SMN-MCNC: 204 MG/DL (ref ?–200)
CO2 SERPL-SCNC: 29 MMOL/L (ref 21–32)
CREAT BLD-MCNC: 1.03 MG/DL (ref 0.7–1.3)
DEPRECATED RDW RBC AUTO: 49.9 FL (ref 35.1–46.3)
ERYTHROCYTE [DISTWIDTH] IN BLOOD BY AUTOMATED COUNT: 14.3 % (ref 11–15)
EST. AVERAGE GLUCOSE BLD GHB EST-MCNC: 126 MG/DL (ref 68–126)
GLOBULIN PLAS-MCNC: 3.3 G/DL (ref 2.8–4.4)
GLUCOSE BLD-MCNC: 97 MG/DL (ref 70–99)
HBA1C MFR BLD HPLC: 6 % (ref ?–5.7)
HCT VFR BLD AUTO: 48.6 % (ref 39–53)
HDLC SERPL-MCNC: 59 MG/DL (ref 40–59)
HGB BLD-MCNC: 16.1 G/DL (ref 13–17.5)
LDLC SERPL CALC-MCNC: 131 MG/DL (ref ?–100)
M PROTEIN MFR SERPL ELPH: 7.4 G/DL (ref 6.4–8.2)
MCH RBC QN AUTO: 31.6 PG (ref 26–34)
MCHC RBC AUTO-ENTMCNC: 33.1 G/DL (ref 31–37)
MCV RBC AUTO: 95.5 FL (ref 80–100)
NONHDLC SERPL-MCNC: 145 MG/DL (ref ?–130)
OSMOLALITY SERPL CALC.SUM OF ELEC: 288 MOSM/KG (ref 275–295)
PLATELET # BLD AUTO: 251 10(3)UL (ref 150–450)
POTASSIUM SERPL-SCNC: 5.1 MMOL/L (ref 3.5–5.1)
RBC # BLD AUTO: 5.09 X10(6)UL (ref 4.3–5.7)
SODIUM SERPL-SCNC: 138 MMOL/L (ref 136–145)
TRIGL SERPL-MCNC: 72 MG/DL (ref 30–149)
TSI SER-ACNC: 2.93 MIU/ML (ref 0.36–3.74)
VLDLC SERPL CALC-MCNC: 14 MG/DL (ref 0–30)
WBC # BLD AUTO: 7.7 X10(3) UL (ref 4–11)

## 2019-09-11 ENCOUNTER — OFFICE VISIT (OUTPATIENT)
Dept: FAMILY MEDICINE CLINIC | Facility: CLINIC | Age: 59
End: 2019-09-11
Payer: COMMERCIAL

## 2019-09-11 VITALS
SYSTOLIC BLOOD PRESSURE: 122 MMHG | HEIGHT: 68 IN | HEART RATE: 68 BPM | RESPIRATION RATE: 14 BRPM | TEMPERATURE: 98 F | WEIGHT: 228.38 LBS | DIASTOLIC BLOOD PRESSURE: 78 MMHG | BODY MASS INDEX: 34.61 KG/M2

## 2019-09-11 DIAGNOSIS — R97.20 ELEVATED PSA, LESS THAN 10 NG/ML: ICD-10-CM

## 2019-09-11 DIAGNOSIS — Z00.00 ANNUAL PHYSICAL EXAM: Primary | ICD-10-CM

## 2019-09-11 DIAGNOSIS — E78.2 MIXED HYPERLIPIDEMIA: ICD-10-CM

## 2019-09-11 DIAGNOSIS — I10 ESSENTIAL HYPERTENSION: ICD-10-CM

## 2019-09-11 DIAGNOSIS — Z12.5 SCREENING PSA (PROSTATE SPECIFIC ANTIGEN): ICD-10-CM

## 2019-09-11 DIAGNOSIS — R73.9 HYPERGLYCEMIA: Chronic | ICD-10-CM

## 2019-09-11 PROCEDURE — 99396 PREV VISIT EST AGE 40-64: CPT | Performed by: FAMILY MEDICINE

## 2019-09-11 NOTE — PROGRESS NOTES
Elke Estrada. is a 61year old male who presents for a complete physical exam.   HPI:   Patient presents with:  Physical      His last annual assessment has been over 1 year: Annual Physical due on 09/10/2019       Pt complains of doing well overall.  p AM    HGB 16.1 09/05/2019 11:08 AM    .0 09/05/2019 11:08 AM      Lab Results   Component Value Date/Time    GLU 97 09/05/2019 11:08 AM     09/05/2019 11:08 AM    K 5.1 09/05/2019 11:08 AM     09/05/2019 11:08 AM    CO2 29.0 09/05/2019 1 leon gehrig's disease in his maternal grandmother. He has a current medication list which includes the following long-term medication(s): lisinopril. He has No Known Allergies. He  reports that he has never smoked.  He has never used smokeless tobacc mass and no thyromegaly present. Cardiovascular: Normal rate, regular rhythm, S1 normal, S2 normal, normal heart sounds and intact distal pulses. Exam reveals no gallop, no S3, no S4 and no friction rub. No murmur heard. Edema not present. Carotid b of these issues and agrees to the plan. The patient is asked to return for CPX in 1 years.     Assessment:  Problem List Items Addressed This Visit        Cardiovascular    Essential hypertension    Overview     Lisinopril 10         Current Assessment & P knee problems. Return in about 6 months (around 3/11/2020) for diabetes follow up.     Ernestine Williamson MD, 9/11/2019, 10:32 AM

## 2019-09-12 ENCOUNTER — TELEPHONE (OUTPATIENT)
Dept: FAMILY MEDICINE CLINIC | Facility: CLINIC | Age: 59
End: 2019-09-12

## 2019-09-12 NOTE — TELEPHONE ENCOUNTER
Form is completed and in my triage bin, okay to make a photocopy and give him the original form and let him know he can pick it up today.   Thanks

## 2019-10-07 ENCOUNTER — TELEPHONE (OUTPATIENT)
Dept: FAMILY MEDICINE CLINIC | Facility: CLINIC | Age: 59
End: 2019-10-07

## 2019-10-07 DIAGNOSIS — I10 ESSENTIAL HYPERTENSION: ICD-10-CM

## 2019-10-07 RX ORDER — LISINOPRIL 10 MG/1
10 TABLET ORAL
Qty: 90 TABLET | Refills: 0 | Status: SHIPPED | OUTPATIENT
Start: 2019-10-07 | End: 2019-12-13

## 2019-10-07 NOTE — TELEPHONE ENCOUNTER
Medication refilled per protocol. Requested Prescriptions     Signed Prescriptions Disp Refills   • lisinopril 10 MG Oral Tab 90 tablet 0     Sig: Take 1 tablet (10 mg total) by mouth once daily.      Authorizing Provider: Sue Retana     Ordering User

## 2019-12-13 ENCOUNTER — TELEPHONE (OUTPATIENT)
Dept: FAMILY MEDICINE CLINIC | Facility: CLINIC | Age: 59
End: 2019-12-13

## 2019-12-13 DIAGNOSIS — I10 ESSENTIAL HYPERTENSION: ICD-10-CM

## 2019-12-13 RX ORDER — LISINOPRIL 10 MG/1
10 TABLET ORAL
Qty: 90 TABLET | Refills: 0 | Status: SHIPPED | OUTPATIENT
Start: 2019-12-13 | End: 2019-12-13

## 2019-12-13 RX ORDER — LISINOPRIL 10 MG/1
10 TABLET ORAL
Qty: 90 TABLET | Refills: 1 | Status: SHIPPED | OUTPATIENT
Start: 2019-12-13 | End: 2020-02-19

## 2019-12-13 NOTE — TELEPHONE ENCOUNTER
Medication refilled per protocol. Requested Prescriptions     Signed Prescriptions Disp Refills   • lisinopril 10 MG Oral Tab 90 tablet 0     Sig: Take 1 tablet (10 mg total) by mouth once daily.      Authorizing Provider: Mike Kohler     Ordering User

## 2019-12-13 NOTE — TELEPHONE ENCOUNTER
[facility] refill to [facility] for Lis[facility]ril for 90 days with 3 refills. [facility] told him to call us to set this up.

## 2020-02-19 ENCOUNTER — TELEPHONE (OUTPATIENT)
Dept: FAMILY MEDICINE CLINIC | Facility: CLINIC | Age: 60
End: 2020-02-19

## 2020-02-19 DIAGNOSIS — I10 ESSENTIAL HYPERTENSION: ICD-10-CM

## 2020-02-19 RX ORDER — LISINOPRIL 10 MG/1
10 TABLET ORAL
Qty: 90 TABLET | Refills: 1 | Status: SHIPPED | OUTPATIENT
Start: 2020-02-19 | End: 2020-09-14

## 2020-02-19 NOTE — TELEPHONE ENCOUNTER
Pt changed ins and now uses [facility] and he needs his lisinopril 10 MG Oral Tab he would like a 90 day supply

## 2020-03-20 ENCOUNTER — TELEPHONE (OUTPATIENT)
Dept: SURGERY | Facility: CLINIC | Age: 60
End: 2020-03-20

## 2020-03-20 ENCOUNTER — OFFICE VISIT (OUTPATIENT)
Dept: SURGERY | Facility: CLINIC | Age: 60
End: 2020-03-20
Payer: MEDICARE

## 2020-03-20 VITALS — SYSTOLIC BLOOD PRESSURE: 119 MMHG | DIASTOLIC BLOOD PRESSURE: 78 MMHG | TEMPERATURE: 98 F | HEART RATE: 110 BPM

## 2020-03-20 DIAGNOSIS — N35.911 STRICTURE OF URETHRAL MEATUS IN MALE, UNSPECIFIED STRICTURE TYPE: ICD-10-CM

## 2020-03-20 DIAGNOSIS — R82.90 URINE FINDING: Primary | ICD-10-CM

## 2020-03-20 PROCEDURE — 53620 DILATE URETHRA STRICTURE: CPT | Performed by: UROLOGY

## 2020-03-20 PROCEDURE — 99203 OFFICE O/P NEW LOW 30 MIN: CPT | Performed by: UROLOGY

## 2020-03-20 RX ORDER — SULFAMETHOXAZOLE AND TRIMETHOPRIM 800; 160 MG/1; MG/1
TABLET ORAL
Qty: 20 TABLET | Refills: 1 | Status: SHIPPED | OUTPATIENT
Start: 2020-03-20 | End: 2020-06-08

## 2020-03-20 NOTE — TELEPHONE ENCOUNTER
Spoke with patient,expresses concern about catheter care. Reviewed what to expect while having a zhong in place. Informed him to increase his fluids and keep track of what he is taking in and what he is putting out. Verbalized understanding.   Urine is cl

## 2020-03-20 NOTE — PROGRESS NOTES
Rooming Clinician:     Marla Lugo. is a 61year old male. Patient presents with:  Urinary Symptoms: Unable to urinate; Closed urethra.       HPI:       Miscellaneous Urology:  Chief Complaint: Patient presents with:  Urinary Symptoms: Unable to [place] otherwise  SKIN: denies any unusual skin lesions or rashes  RESPIRATORY: denies shortness of breath with exertion  CARDIOVASCULAR: denies chest pain on exertion  GI: denies abdominal pain and denies heartburn  : see HPI  NEURO: no sensory or motor compla 09/05/2019    ALKPHO 101 09/05/2019    AST 19 09/05/2019    ALT 38 09/05/2019       X-RAY[de-identified]         ASSESSMENT:     Urethral meatal stenosis    PLAN:     Erazo catheter drainage until Monday  Bactrim double strength 1 p.o. twice daily for 7 days    Conside

## 2020-03-20 NOTE — TELEPHONE ENCOUNTER
Pt called stating pt had a procedure 3-20-20. Requesting to speak to the nurse to make sure everything is working. Call transferred to the nurse.

## 2020-03-23 ENCOUNTER — OFFICE VISIT (OUTPATIENT)
Dept: SURGERY | Facility: CLINIC | Age: 60
End: 2020-03-23
Payer: MEDICARE

## 2020-03-23 VITALS — DIASTOLIC BLOOD PRESSURE: 84 MMHG | SYSTOLIC BLOOD PRESSURE: 130 MMHG | HEART RATE: 90 BPM | TEMPERATURE: 98 F

## 2020-03-23 DIAGNOSIS — N35.911 STRICTURE OF URETHRAL MEATUS IN MALE, UNSPECIFIED STRICTURE TYPE: Primary | ICD-10-CM

## 2020-03-23 PROBLEM — N35.119 POSTINFECTIVE URETHRAL STRICTURE IN MALE: Status: ACTIVE | Noted: 2020-03-23

## 2020-03-23 PROCEDURE — 99213 OFFICE O/P EST LOW 20 MIN: CPT | Performed by: UROLOGY

## 2020-03-23 RX ORDER — CLOBETASOL PROPIONATE 0.5 MG/G
1 CREAM TOPICAL 2 TIMES DAILY
Qty: 1 TUBE | Refills: 1 | Status: SHIPPED | OUTPATIENT
Start: 2020-03-23 | End: 2020-12-01

## 2020-03-23 NOTE — PROGRESS NOTES
Rooming Clinician:     Adan Garcia. is a 61year old male. Patient presents with: Follow - Up: has zhong. draining ok. HPI:     Patient returns today.   He had a severe meatal stenosis which was dilated under local anesthesia in the office la HPI  NEURO: no sensory or motor complaint    EXAM:     /84   Pulse 90   Temp 98 °F (36.7 °C)   GENERAL: well developed, well nourished,in no apparent distress  SKIN: no rashes,no suspicious lesions  HEENT: atraumatic, normocephalic,ears and throat ar meatotomy associated with cystoscopy.         Diagnoses and all orders for this visit:    Stricture of urethral meatus in male, unspecified stricture type        Follow up examination in 2 to 3 weeks for follow-up examination at which time if patient contin

## 2020-03-25 ENCOUNTER — TELEPHONE (OUTPATIENT)
Dept: SURGERY | Facility: CLINIC | Age: 60
End: 2020-03-25

## 2020-03-25 NOTE — TELEPHONE ENCOUNTER
Phoned the patient regarding his question about his recent procedure but he insisted on wanting to speak to Javier Arceo. Call forwarded.

## 2020-03-25 NOTE — TELEPHONE ENCOUNTER
Spoke with patient. He wanted to let us know how he was doing. States that at first his stream was like a \"fire hose\". Now it has slowed down a little but not like it was when he first saw Dr Shirley Jarrell.   We talked about the meatal dilator and how we are

## 2020-03-27 ENCOUNTER — TELEPHONE (OUTPATIENT)
Dept: SURGERY | Facility: CLINIC | Age: 60
End: 2020-03-27

## 2020-03-27 NOTE — TELEPHONE ENCOUNTER
Spoke with patient and told him that we had a meatal dilator that he can use. He was instructed to use the lidocaine gel that Dr Fam Muñoz prescribed along with the dilator. He is to use it twice a week.   Instructed to wash after each use with soap and elliot

## 2020-03-27 NOTE — TELEPHONE ENCOUNTER
Spoke with Aminta Kocher at the pharmacy and the patient is going to use a discount card. He did get the medication.

## 2020-03-27 NOTE — TELEPHONE ENCOUNTER
Pt returning call want to know is it necessary to come in today or not going off conversation on 3/25 please advise

## 2020-03-27 NOTE — TELEPHONE ENCOUNTER
Per pharmacy calling in regards to medication, is not covered by insurance. There is an OTC and wanting to know if they can get permission to give to pt. Otherwise needing to call insurance for prior auth.  Please advise

## 2020-03-30 NOTE — TELEPHONE ENCOUNTER
Was able to obtain a meatal dilator from Dr Amalia Cardona. Patient came in on 3/27 and picked it up. Verbal instructions were given to use dilator as instructed twice weekly with the lidocaine gel. Will call with any problems.

## 2020-06-03 ENCOUNTER — TELEPHONE (OUTPATIENT)
Dept: FAMILY MEDICINE CLINIC | Facility: CLINIC | Age: 60
End: 2020-06-03

## 2020-06-03 DIAGNOSIS — Z12.5 SCREENING PSA (PROSTATE SPECIFIC ANTIGEN): ICD-10-CM

## 2020-06-03 DIAGNOSIS — R73.9 BLOOD GLUCOSE ELEVATED: ICD-10-CM

## 2020-06-03 DIAGNOSIS — E78.2 MIXED HYPERLIPIDEMIA: ICD-10-CM

## 2020-06-03 DIAGNOSIS — R97.20 ELEVATED PSA, LESS THAN 10 NG/ML: ICD-10-CM

## 2020-06-03 DIAGNOSIS — I10 ESSENTIAL HYPERTENSION: ICD-10-CM

## 2020-06-03 DIAGNOSIS — R73.9 HYPERGLYCEMIA: Primary | Chronic | ICD-10-CM

## 2020-06-03 NOTE — TELEPHONE ENCOUNTER
Notified pt that lab orders were reissued at THE [facility].

## 2020-06-04 ENCOUNTER — APPOINTMENT (OUTPATIENT)
Dept: LAB | Age: 60
End: 2020-06-04
Attending: FAMILY MEDICINE
Payer: MEDICARE

## 2020-06-04 DIAGNOSIS — R97.20 ELEVATED PSA, LESS THAN 10 NG/ML: ICD-10-CM

## 2020-06-04 DIAGNOSIS — R73.9 HYPERGLYCEMIA: Chronic | ICD-10-CM

## 2020-06-04 DIAGNOSIS — E78.2 MIXED HYPERLIPIDEMIA: ICD-10-CM

## 2020-06-04 DIAGNOSIS — R73.9 BLOOD GLUCOSE ELEVATED: ICD-10-CM

## 2020-06-04 DIAGNOSIS — Z12.5 SCREENING PSA (PROSTATE SPECIFIC ANTIGEN): ICD-10-CM

## 2020-06-04 DIAGNOSIS — I10 ESSENTIAL HYPERTENSION: ICD-10-CM

## 2020-06-04 PROCEDURE — 80061 LIPID PANEL: CPT | Performed by: FAMILY MEDICINE

## 2020-06-04 PROCEDURE — 83036 HEMOGLOBIN GLYCOSYLATED A1C: CPT | Performed by: FAMILY MEDICINE

## 2020-06-04 PROCEDURE — 36415 COLL VENOUS BLD VENIPUNCTURE: CPT | Performed by: FAMILY MEDICINE

## 2020-06-04 PROCEDURE — 84153 ASSAY OF PSA TOTAL: CPT | Performed by: FAMILY MEDICINE

## 2020-06-04 PROCEDURE — 80053 COMPREHEN METABOLIC PANEL: CPT | Performed by: FAMILY MEDICINE

## 2020-06-05 ENCOUNTER — OFFICE VISIT (OUTPATIENT)
Dept: SURGERY | Facility: CLINIC | Age: 60
End: 2020-06-05
Payer: MEDICARE

## 2020-06-05 ENCOUNTER — TELEPHONE (OUTPATIENT)
Dept: SURGERY | Facility: CLINIC | Age: 60
End: 2020-06-05

## 2020-06-05 VITALS — DIASTOLIC BLOOD PRESSURE: 81 MMHG | SYSTOLIC BLOOD PRESSURE: 129 MMHG | HEART RATE: 88 BPM

## 2020-06-05 DIAGNOSIS — N35.811 OTHER STRICTURE OF URETHRAL MEATUS IN MALE: ICD-10-CM

## 2020-06-05 DIAGNOSIS — N35.819 OTHER STRICTURE OF URETHRA IN MALE: Primary | ICD-10-CM

## 2020-06-05 DIAGNOSIS — R82.90 URINE FINDING: Primary | ICD-10-CM

## 2020-06-05 PROCEDURE — 99213 OFFICE O/P EST LOW 20 MIN: CPT | Performed by: UROLOGY

## 2020-06-05 PROCEDURE — 81003 URINALYSIS AUTO W/O SCOPE: CPT | Performed by: UROLOGY

## 2020-06-05 NOTE — PROGRESS NOTES
Rooming Clinician:     Phillipstacey Balderrama. is a 61year old male. Patient presents with: Follow - Up: h/o stricture of urethral meatus. Stream is normal. Denies hematuria.   Nocuria occasional x1        HPI:     Patient has a history of severe meatal steno Alcohol use:  Yes      Alcohol/week: 0.0 standard drinks      Frequency: Monthly or less      Drinks per session: 1 or 2      Binge frequency: Never      Comment: 0-1 drinks per month    Drug use: No       REVIEW OF SYSTEMS:     GENERAL HEALTH: feels well o SCOPE    Other stricture of urethral meatus in male          The patient indicates understanding of these issues and agrees to the plan. Mary Moreland M.D.   Urology

## 2020-06-05 NOTE — TELEPHONE ENCOUNTER
Patient states he was in office today and a procedure was scheduled. He needs to reschedule due to conflicting dr shanks.  Please advise

## 2020-06-08 NOTE — TELEPHONE ENCOUNTER
6/5/20 Cysto, UD and UC scheduled at [address] on 6/17/20. Reviewed pre op instructions. Verbalized understanding.

## 2020-06-09 ENCOUNTER — TELEPHONE (OUTPATIENT)
Dept: FAMILY MEDICINE CLINIC | Facility: CLINIC | Age: 60
End: 2020-06-09

## 2020-06-09 NOTE — TELEPHONE ENCOUNTER
Patient is calling is calling stating Dr. Michelle Washburn is requesting a EKG to be done prior to procedure scheduled on 6/17 and would like to know if Dr. Jane Fernandes can enter order for it. Patient has Medicare and EKG can not be done in office.  Patient is askin

## 2020-06-10 NOTE — TELEPHONE ENCOUNTER
Patient called checking status of call, wants to know if would have enough time to get EKG done at the hospital due to his insurance. Patient stated he decided to stop taking the aspirin on his own.

## 2020-06-11 ENCOUNTER — HOSPITAL ENCOUNTER (OUTPATIENT)
Dept: CV DIAGNOSTICS | Facility: HOSPITAL | Age: 60
Discharge: HOME | End: 2020-06-11
Attending: FAMILY MEDICINE
Payer: MEDICARE

## 2020-06-11 ENCOUNTER — OFFICE VISIT (OUTPATIENT)
Dept: FAMILY MEDICINE CLINIC | Facility: CLINIC | Age: 60
End: 2020-06-11
Payer: MEDICARE

## 2020-06-11 VITALS
HEIGHT: 67.75 IN | RESPIRATION RATE: 14 BRPM | HEART RATE: 72 BPM | BODY MASS INDEX: 35.27 KG/M2 | DIASTOLIC BLOOD PRESSURE: 74 MMHG | WEIGHT: 230 LBS | SYSTOLIC BLOOD PRESSURE: 110 MMHG | TEMPERATURE: 98 F

## 2020-06-11 DIAGNOSIS — E78.2 MIXED HYPERLIPIDEMIA: ICD-10-CM

## 2020-06-11 DIAGNOSIS — R73.9 HYPERGLYCEMIA: Chronic | ICD-10-CM

## 2020-06-11 DIAGNOSIS — Z01.818 PREOPERATIVE CLEARANCE: ICD-10-CM

## 2020-06-11 DIAGNOSIS — N35.119 POSTINFECTIVE URETHRAL STRICTURE IN MALE: Primary | ICD-10-CM

## 2020-06-11 DIAGNOSIS — I10 ESSENTIAL HYPERTENSION: ICD-10-CM

## 2020-06-11 PROCEDURE — 93010 ELECTROCARDIOGRAM REPORT: CPT | Performed by: INTERNAL MEDICINE

## 2020-06-11 PROCEDURE — 93005 ELECTROCARDIOGRAM TRACING: CPT

## 2020-06-11 PROCEDURE — 99214 OFFICE O/P EST MOD 30 MIN: CPT | Performed by: FAMILY MEDICINE

## 2020-06-11 NOTE — H&P
Elke Estrada. is a 61year old male who presents for a pre-operative physical exam at the request of Dr. Nathanael Parsons for evaluation of preoperative risk. Patient is to have urethrial dilation, to be done by Dr. Nathanael Parsons  at [address] on 6/17/2020.     Pt has ha Negative for diaphoresis, fatigue and fever. HENT: Negative. Respiratory: Negative for shortness of breath. Cardiovascular: Negative for chest pain. Gastrointestinal: Negative for abdominal pain.    Endocrine: Negative for polydipsia, polyphagia a guarding. No hernia. Musculoskeletal: Normal range of motion. Lymphadenopathy:     He has no cervical adenopathy. He has no axillary adenopathy. Neurological: He is alert and oriented to person, place, and time.  He has normal strength and normal re Return in about 3 months (around 9/11/2020) for AWV with chonic condition follow up.   Tania Alvares MD 6/11/2020

## 2020-06-11 NOTE — PATIENT INSTRUCTIONS
Medication Sig   • Clobetasol Propionate 0.05 % External Cream    • Lidocaine 2 % External Gel    • lisinopril 10 MG Oral Tab OK   • Calcium Carbonate-Vitamin D 600-400 MG-UNIT Oral Tab Hold day of procedure   • aspirin 81 MG Oral Tab EC OK   • Cholecalcif

## 2020-06-15 ENCOUNTER — ANESTHESIA EVENT (OUTPATIENT)
Dept: SURGERY | Facility: HOSPITAL | Age: 60
End: 2020-06-15
Payer: MEDICARE

## 2020-06-15 ENCOUNTER — LAB ENCOUNTER (OUTPATIENT)
Dept: LAB | Facility: HOSPITAL | Age: 60
End: 2020-06-15
Attending: UROLOGY
Payer: MEDICARE

## 2020-06-15 DIAGNOSIS — N35.119 POSTINFECTIVE URETHRAL STRICTURE IN MALE: ICD-10-CM

## 2020-06-15 NOTE — H&P
Patient has a history of severe meatal stenosis which initially was dilated under local anesthesia in the office months ago. At that time he was given a urethral meatal dilator and a prescription for clobetasol.   He did use the clobetasol cream for a whil drinks      Frequency: Monthly or less      Drinks per session: 1 or 2      Binge frequency: Never      Comment: 0-1 drinks per month    Drug use:  No         REVIEW OF SYSTEMS:      GENERAL HEALTH: feels well otherwise  SKIN: denies any unusual skin lesion agrees to the plan.     Faraz Flannery M.D.   Urology

## 2020-06-17 ENCOUNTER — HOSPITAL ENCOUNTER (OUTPATIENT)
Facility: HOSPITAL | Age: 60
Setting detail: HOSPITAL OUTPATIENT SURGERY
Discharge: HOME | End: 2020-06-17
Attending: UROLOGY | Admitting: UROLOGY
Payer: MEDICARE

## 2020-06-17 ENCOUNTER — ANESTHESIA (OUTPATIENT)
Dept: SURGERY | Facility: HOSPITAL | Age: 60
End: 2020-06-17
Payer: MEDICARE

## 2020-06-17 ENCOUNTER — TELEPHONE (OUTPATIENT)
Dept: UROLOGY | Facility: HOSPITAL | Age: 60
End: 2020-06-17

## 2020-06-17 VITALS
DIASTOLIC BLOOD PRESSURE: 72 MMHG | OXYGEN SATURATION: 98 % | HEART RATE: 76 BPM | WEIGHT: 235.44 LBS | RESPIRATION RATE: 18 BRPM | HEIGHT: 68.5 IN | BODY MASS INDEX: 35.27 KG/M2 | SYSTOLIC BLOOD PRESSURE: 117 MMHG | TEMPERATURE: 98 F

## 2020-06-17 DIAGNOSIS — N35.819 OTHER STRICTURE OF URETHRA IN MALE: ICD-10-CM

## 2020-06-17 DIAGNOSIS — N35.119 POSTINFECTIVE URETHRAL STRICTURE IN MALE: Primary | ICD-10-CM

## 2020-06-17 PROCEDURE — 0TND8ZZ RELEASE URETHRA, VIA NATURAL OR ARTIFICIAL OPENING ENDOSCOPIC: ICD-10-PCS | Performed by: UROLOGY

## 2020-06-17 PROCEDURE — 52276 CYSTOSCOPY AND TREATMENT: CPT | Performed by: UROLOGY

## 2020-06-17 RX ORDER — DIPHENHYDRAMINE HYDROCHLORIDE 50 MG/ML
12.5 INJECTION INTRAMUSCULAR; INTRAVENOUS AS NEEDED
Status: DISCONTINUED | OUTPATIENT
Start: 2020-06-17 | End: 2020-06-17

## 2020-06-17 RX ORDER — ACETAMINOPHEN 500 MG
1000 TABLET ORAL ONCE AS NEEDED
Status: DISCONTINUED | OUTPATIENT
Start: 2020-06-17 | End: 2020-06-17

## 2020-06-17 RX ORDER — HYDROMORPHONE HYDROCHLORIDE 1 MG/ML
0.4 INJECTION, SOLUTION INTRAMUSCULAR; INTRAVENOUS; SUBCUTANEOUS EVERY 5 MIN PRN
Status: DISCONTINUED | OUTPATIENT
Start: 2020-06-17 | End: 2020-06-17

## 2020-06-17 RX ORDER — HYDROCODONE BITARTRATE AND ACETAMINOPHEN 5; 325 MG/1; MG/1
1 TABLET ORAL EVERY 4 HOURS PRN
Status: CANCELLED | OUTPATIENT
Start: 2020-06-17

## 2020-06-17 RX ORDER — MEPERIDINE HYDROCHLORIDE 25 MG/ML
12.5 INJECTION INTRAMUSCULAR; INTRAVENOUS; SUBCUTANEOUS AS NEEDED
Status: DISCONTINUED | OUTPATIENT
Start: 2020-06-17 | End: 2020-06-17

## 2020-06-17 RX ORDER — LIDOCAINE HYDROCHLORIDE 10 MG/ML
INJECTION, SOLUTION EPIDURAL; INFILTRATION; INTRACAUDAL; PERINEURAL AS NEEDED
Status: DISCONTINUED | OUTPATIENT
Start: 2020-06-17 | End: 2020-06-17 | Stop reason: SURG

## 2020-06-17 RX ORDER — NALOXONE HYDROCHLORIDE 0.4 MG/ML
80 INJECTION, SOLUTION INTRAMUSCULAR; INTRAVENOUS; SUBCUTANEOUS AS NEEDED
Status: DISCONTINUED | OUTPATIENT
Start: 2020-06-17 | End: 2020-06-17

## 2020-06-17 RX ORDER — METOCLOPRAMIDE HYDROCHLORIDE 5 MG/ML
10 INJECTION INTRAMUSCULAR; INTRAVENOUS AS NEEDED
Status: DISCONTINUED | OUTPATIENT
Start: 2020-06-17 | End: 2020-06-17

## 2020-06-17 RX ORDER — SODIUM CHLORIDE, SODIUM LACTATE, POTASSIUM CHLORIDE, CALCIUM CHLORIDE 600; 310; 30; 20 MG/100ML; MG/100ML; MG/100ML; MG/100ML
INJECTION, SOLUTION INTRAVENOUS CONTINUOUS
Status: DISCONTINUED | OUTPATIENT
Start: 2020-06-17 | End: 2020-06-17

## 2020-06-17 RX ORDER — PHENYLEPHRINE HCL 10 MG/ML
VIAL (ML) INJECTION AS NEEDED
Status: DISCONTINUED | OUTPATIENT
Start: 2020-06-17 | End: 2020-06-17 | Stop reason: SURG

## 2020-06-17 RX ORDER — HYDROCODONE BITARTRATE AND ACETAMINOPHEN 5; 325 MG/1; MG/1
2 TABLET ORAL AS NEEDED
Status: DISCONTINUED | OUTPATIENT
Start: 2020-06-17 | End: 2020-06-17

## 2020-06-17 RX ORDER — ACETAMINOPHEN 325 MG/1
650 TABLET ORAL EVERY 4 HOURS PRN
Status: CANCELLED | OUTPATIENT
Start: 2020-06-17

## 2020-06-17 RX ORDER — ACETAMINOPHEN 500 MG
1000 TABLET ORAL ONCE
COMMUNITY
End: 2020-09-14 | Stop reason: ALTCHOICE

## 2020-06-17 RX ORDER — DEXAMETHASONE SODIUM PHOSPHATE 4 MG/ML
VIAL (ML) INJECTION AS NEEDED
Status: DISCONTINUED | OUTPATIENT
Start: 2020-06-17 | End: 2020-06-17 | Stop reason: SURG

## 2020-06-17 RX ORDER — CEFAZOLIN SODIUM/WATER 2 G/20 ML
2 SYRINGE (ML) INTRAVENOUS ONCE
Status: COMPLETED | OUTPATIENT
Start: 2020-06-17 | End: 2020-06-17

## 2020-06-17 RX ORDER — HYDROCODONE BITARTRATE AND ACETAMINOPHEN 5; 325 MG/1; MG/1
1 TABLET ORAL AS NEEDED
Status: DISCONTINUED | OUTPATIENT
Start: 2020-06-17 | End: 2020-06-17

## 2020-06-17 RX ORDER — ACETAMINOPHEN 500 MG
1000 TABLET ORAL ONCE
Status: DISCONTINUED | OUTPATIENT
Start: 2020-06-17 | End: 2020-06-17 | Stop reason: HOSPADM

## 2020-06-17 RX ORDER — MIDAZOLAM HYDROCHLORIDE 1 MG/ML
1 INJECTION INTRAMUSCULAR; INTRAVENOUS EVERY 5 MIN PRN
Status: DISCONTINUED | OUTPATIENT
Start: 2020-06-17 | End: 2020-06-17

## 2020-06-17 RX ORDER — HYDROCODONE BITARTRATE AND ACETAMINOPHEN 5; 325 MG/1; MG/1
2 TABLET ORAL EVERY 4 HOURS PRN
Status: CANCELLED | OUTPATIENT
Start: 2020-06-17

## 2020-06-17 RX ORDER — ONDANSETRON 2 MG/ML
4 INJECTION INTRAMUSCULAR; INTRAVENOUS AS NEEDED
Status: DISCONTINUED | OUTPATIENT
Start: 2020-06-17 | End: 2020-06-17

## 2020-06-17 RX ADMIN — DEXAMETHASONE SODIUM PHOSPHATE 4 MG: 4 MG/ML VIAL (ML) INJECTION at 09:24:00

## 2020-06-17 RX ADMIN — LIDOCAINE HYDROCHLORIDE 50 MG: 10 INJECTION, SOLUTION EPIDURAL; INFILTRATION; INTRACAUDAL; PERINEURAL at 09:15:00

## 2020-06-17 RX ADMIN — SODIUM CHLORIDE, SODIUM LACTATE, POTASSIUM CHLORIDE, CALCIUM CHLORIDE: 600; 310; 30; 20 INJECTION, SOLUTION INTRAVENOUS at 09:53:00

## 2020-06-17 RX ADMIN — SODIUM CHLORIDE, SODIUM LACTATE, POTASSIUM CHLORIDE, CALCIUM CHLORIDE: 600; 310; 30; 20 INJECTION, SOLUTION INTRAVENOUS at 09:11:00

## 2020-06-17 RX ADMIN — PHENYLEPHRINE HCL 200 MCG: 10 MG/ML VIAL (ML) INJECTION at 09:28:00

## 2020-06-17 RX ADMIN — CEFAZOLIN SODIUM/WATER 2 G: 2 G/20 ML SYRINGE (ML) INTRAVENOUS at 09:22:00

## 2020-06-17 RX ADMIN — PHENYLEPHRINE HCL 200 MCG: 10 MG/ML VIAL (ML) INJECTION at 09:37:00

## 2020-06-17 RX ADMIN — PHENYLEPHRINE HCL 200 MCG: 10 MG/ML VIAL (ML) INJECTION at 09:42:00

## 2020-06-17 NOTE — TELEPHONE ENCOUNTER
Follow-up in [place] office next week, 7 to 10 days.   Patient should bring with him the meatal dilator that we had given him previously

## 2020-06-17 NOTE — ANESTHESIA POSTPROCEDURE EVALUATION
[address]  Patient Status:  Hospital Outpatient Surgery   Age/Gender 61year old male MRN [id number]   Location [facility] Attending Suki Naranjo MD   Hosp Day # 0 PCP MD Arsh Mcdonald

## 2020-06-17 NOTE — OPERATIVE REPORT
Operative Note    Patient Name: Agueda Michel.     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 The cystoscope was then advanced through the prostate which showed some enlargement of the lateral lobes with touching in the midline and was approximately 3-1/2 cm long.   Examination of the bladder using the right ankle a

## 2020-06-17 NOTE — ANESTHESIA PROCEDURE NOTES
Airway  Urgency: elective      General Information and Staff    Patient location during procedure: OR  Anesthesiologist: Mayte Greer MD  Resident/CRNA: Julius Michelle CRNA  Performed: CRNA     Indications and Patient Condition  Indications for airwa

## 2020-06-17 NOTE — ANESTHESIA PREPROCEDURE EVALUATION
PRE-OP EVALUATION    Patient Name: Antoinette Dumont.    Pre-op Diagnosis: Other stricture of urethra in male [N35.819]    Procedure(s):  cystoscopy, urethral dilation, urethral calibration    Surgeon(s) and Role:     * Savanna Dill MD - Primary    Pr Component Value Date     06/04/2020    K 3.9 06/04/2020     06/04/2020    CO2 25.0 06/04/2020    BUN 21 (H) 06/04/2020    CREATSERUM 1.04 06/04/2020    GLU 91 06/04/2020    CA 9.2 06/04/2020            Airway      Mallampati: II  Mouth openin

## 2020-06-17 NOTE — INTERVAL H&P NOTE
Pre-op Diagnosis: Other stricture of urethra in male [N35.819]    The above referenced H&P was reviewed by Geneva Rodriguez MD on 6/17/2020, the patient was examined and no significant changes have occurred in the patient's condition since the H&P was per

## 2020-06-25 ENCOUNTER — OFFICE VISIT (OUTPATIENT)
Dept: SURGERY | Facility: CLINIC | Age: 60
End: 2020-06-25
Payer: MEDICARE

## 2020-06-25 VITALS — HEART RATE: 89 BPM | DIASTOLIC BLOOD PRESSURE: 86 MMHG | SYSTOLIC BLOOD PRESSURE: 125 MMHG

## 2020-06-25 DIAGNOSIS — R82.90 URINE FINDING: Primary | ICD-10-CM

## 2020-06-25 DIAGNOSIS — N35.916 STRICTURE OF OVERLAPPING SITES OF URETHRA IN MALE, UNSPECIFIED STRICTURE TYPE: ICD-10-CM

## 2020-06-25 PROCEDURE — 99211 OFF/OP EST MAY X REQ PHY/QHP: CPT | Performed by: UROLOGY

## 2020-06-25 PROCEDURE — 81003 URINALYSIS AUTO W/O SCOPE: CPT | Performed by: UROLOGY

## 2020-06-25 NOTE — PROGRESS NOTES
Rooming Clinician:     Ambreen Balderrama. is a 61year old male. Patient presents with:  Surgical Followup: s/p UD 6/17/20. Stream seems better.          HPI:     Patient underwent ureteroscopy with dilation of a very tight meatal stenosis and was found to month    Drug use: No       REVIEW OF SYSTEMS:     GENERAL HEALTH: feels well otherwise  SKIN: denies any unusual skin lesions or rashes  RESPIRATORY: denies shortness of breath with exertion  CARDIOVASCULAR: denies chest pain on exertion  GI: denies abdom

## 2020-09-08 ENCOUNTER — TELEPHONE (OUTPATIENT)
Dept: FAMILY MEDICINE CLINIC | Facility: CLINIC | Age: 60
End: 2020-09-08

## 2020-09-14 ENCOUNTER — OFFICE VISIT (OUTPATIENT)
Dept: FAMILY MEDICINE CLINIC | Facility: CLINIC | Age: 60
End: 2020-09-14
Payer: MEDICARE

## 2020-09-14 VITALS
WEIGHT: 234 LBS | BODY MASS INDEX: 36.3 KG/M2 | HEART RATE: 64 BPM | HEIGHT: 67.5 IN | TEMPERATURE: 98 F | DIASTOLIC BLOOD PRESSURE: 60 MMHG | RESPIRATION RATE: 16 BRPM | SYSTOLIC BLOOD PRESSURE: 104 MMHG

## 2020-09-14 DIAGNOSIS — Z00.00 ANNUAL PHYSICAL EXAM: Primary | ICD-10-CM

## 2020-09-14 DIAGNOSIS — I10 ESSENTIAL HYPERTENSION: ICD-10-CM

## 2020-09-14 DIAGNOSIS — R73.9 HYPERGLYCEMIA: Chronic | ICD-10-CM

## 2020-09-14 DIAGNOSIS — E78.2 MIXED HYPERLIPIDEMIA: ICD-10-CM

## 2020-09-14 DIAGNOSIS — R97.20 ELEVATED PSA, LESS THAN 10 NG/ML: ICD-10-CM

## 2020-09-14 DIAGNOSIS — Z00.00 ENCOUNTER FOR ANNUAL HEALTH EXAMINATION: ICD-10-CM

## 2020-09-14 DIAGNOSIS — M17.0 PRIMARY OSTEOARTHRITIS OF BOTH KNEES: ICD-10-CM

## 2020-09-14 PROCEDURE — G0402 INITIAL PREVENTIVE EXAM: HCPCS | Performed by: FAMILY MEDICINE

## 2020-09-14 RX ORDER — IBUPROFEN 600 MG/1
TABLET ORAL
COMMUNITY
Start: 2020-07-18 | End: 2020-09-14 | Stop reason: ALTCHOICE

## 2020-09-14 RX ORDER — LISINOPRIL 10 MG/1
10 TABLET ORAL
Qty: 90 TABLET | Refills: 3 | Status: SHIPPED | OUTPATIENT
Start: 2020-09-14 | End: 2021-06-26

## 2020-09-14 NOTE — PROGRESS NOTES
HPI:   Elke Estrada. is a 61year old male who presents for a Medicare Initial Preventative Physical Exam (Welcome to Medicare- < 12 months on Medicare). Doing well overall.    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     REVIEW OF SYSTEMS:   Review of Systems   Constitutional: Negative. Negative for activity change, appetite change, chills and fever. HENT: Negative. Eyes: Negative. Respiratory: Negative. Negative for shortness of breath.     Cardiovascu Conjunctivae and EOM are normal.   Neck: Trachea normal and normal range of motion. Neck supple. Normal carotid pulses present. No edema present. No thyroid mass and no thyromegaly present.    Cardiovascular: Normal rate, regular rhythm, S1 normal, S2 carina Essential hypertension    Overview     Lisinopril 10         Current Assessment & Plan     Stable, Continue present management.     Blood Pressure and Cardiac Medications          lisinopril 10 MG Oral Tab                 Relevant Medications    lisinopr well-being?: (building projects)    This section provided for quick review of chart, separate sheet to patient  [address] Internal Lab or Procedure External Lab or Procedure   Diabetes Screening      HbgA1C   Annually concentrates   Clients of institutions for the mentally retarded   Persons who live in the same house as a HepB virus carrier   Homosexual men   Illicit injectable drug abusers     Tetanus Toxoid  Only covered with a cut with metal- TD and TDaP Not covered

## 2020-09-14 NOTE — PATIENT INSTRUCTIONS
Seema Ayala Jr.'s SCREENING SCHEDULE   Tests on this list are recommended by your physician but may not be covered, or covered at this frequency, by your insurer. Please check with your insurance carrier before scheduling to verify coverage.     PREVENT Abdominal aortic aneurysm screening (once between ages 73-68)  No results found for this or any previous visit.  Limited to patients who meet one of the following criteria:   • Men who are 73-68 years old and have smoked more than 100 cigarettes in their li Clients of institutions for the mentally retarded   Persons who live in the same house as a HepB virus carrier   Homosexual men   Illicit injectable drug abusers     Tetanus Toxoid- Only covered with a cut with metal- TD and TDaP Not covered by [facility]

## 2020-09-25 ENCOUNTER — OFFICE VISIT (OUTPATIENT)
Dept: SURGERY | Facility: CLINIC | Age: 60
End: 2020-09-25
Payer: MEDICARE

## 2020-09-25 VITALS
WEIGHT: 228 LBS | HEART RATE: 85 BPM | SYSTOLIC BLOOD PRESSURE: 125 MMHG | HEIGHT: 68 IN | BODY MASS INDEX: 34.56 KG/M2 | DIASTOLIC BLOOD PRESSURE: 76 MMHG | TEMPERATURE: 98 F

## 2020-09-25 DIAGNOSIS — N99.115 POSTPROCEDURAL MALE FOSSA NAVICULARIS URETHRAL STRICTURE: ICD-10-CM

## 2020-09-25 DIAGNOSIS — R82.90 URINE FINDING: Primary | ICD-10-CM

## 2020-09-25 DIAGNOSIS — Q64.33 CONGENITAL MEATAL STENOSIS: ICD-10-CM

## 2020-09-25 LAB
APPEARANCE: CLEAR
BILIRUBIN: NEGATIVE
GLUCOSE (URINE DIPSTICK): NEGATIVE MG/DL
KETONES (URINE DIPSTICK): NEGATIVE MG/DL
LEUKOCYTES: NEGATIVE
MULTISTIX LOT#: 1044 NUMERIC
NITRITE, URINE: NEGATIVE
OCCULT BLOOD: NEGATIVE
PH, URINE: 5.5 (ref 4.5–8)
PROTEIN (URINE DIPSTICK): NEGATIVE MG/DL
SPECIFIC GRAVITY: >=1.03 (ref 1–1.03)
URINE-COLOR: YELLOW
UROBILINOGEN,SEMI-QN: 0.2 MG/DL (ref 0–1.9)

## 2020-09-25 PROCEDURE — 53601 DILATE URETHRA STRICTURE: CPT | Performed by: UROLOGY

## 2020-09-25 PROCEDURE — 81003 URINALYSIS AUTO W/O SCOPE: CPT | Performed by: UROLOGY

## 2020-09-25 NOTE — PROGRESS NOTES
Rooming Clinician:     Jermaine Vasquez. is a 61year old male. Patient presents with:   Follow - Up: Last seen 6/25; Non compliant to self dilating of the urethral meatus twice weekly     HPI:     Patient has a fossa navicularis and meatal stricture which lesions or rashes  RESPIRATORY: denies shortness of breath with exertion  CARDIOVASCULAR: denies chest pain on exertion  GI: denies abdominal pain and denies heartburn  : see HPI  NEURO: no sensory or motor complaint    EXAM:     /76 (BP Location: finding  -     URINALYSIS, AUTO, W/O SCOPE    Congenital meatal stenosis    Postprocedural male fossa navicularis urethral stricture        Follow up examination in 3 months    The patient indicates understanding of these issues and agrees to the plan.

## 2020-10-31 ENCOUNTER — APPOINTMENT (OUTPATIENT)
Dept: LAB | Facility: HOSPITAL | Age: 60
End: 2020-10-31
Attending: INTERNAL MEDICINE
Payer: MEDICARE

## 2020-10-31 DIAGNOSIS — Z01.818 PRE-OP TESTING: ICD-10-CM

## 2020-11-03 PROBLEM — D12.3 BENIGN NEOPLASM OF TRANSVERSE COLON: Status: ACTIVE | Noted: 2020-11-03

## 2020-11-03 PROBLEM — Z86.0100 PERSONAL HISTORY OF COLONIC POLYPS: Status: ACTIVE | Noted: 2020-11-03

## 2020-11-03 PROBLEM — D12.5 BENIGN NEOPLASM OF SIGMOID COLON: Status: ACTIVE | Noted: 2020-11-03

## 2020-11-03 PROBLEM — Z86.010 PERSONAL HISTORY OF COLONIC POLYPS: Status: ACTIVE | Noted: 2020-11-03

## 2020-11-03 PROBLEM — D12.4 BENIGN NEOPLASM OF DESCENDING COLON: Status: ACTIVE | Noted: 2020-11-03

## 2020-11-05 ENCOUNTER — TELEPHONE (OUTPATIENT)
Dept: FAMILY MEDICINE CLINIC | Facility: CLINIC | Age: 60
End: 2020-11-05

## 2020-11-05 DIAGNOSIS — E78.2 MIXED HYPERLIPIDEMIA: ICD-10-CM

## 2020-11-05 DIAGNOSIS — R97.20 ELEVATED PSA, LESS THAN 10 NG/ML: ICD-10-CM

## 2020-11-05 DIAGNOSIS — G47.33 OSA (OBSTRUCTIVE SLEEP APNEA): Primary | ICD-10-CM

## 2020-11-05 DIAGNOSIS — E55.9 VITAMIN D DEFICIENCY: ICD-10-CM

## 2020-11-05 DIAGNOSIS — R73.9 HYPERGLYCEMIA: ICD-10-CM

## 2020-11-05 NOTE — TELEPHONE ENCOUNTER
Called and talked to patient went over orders from Dr Jane Fernandes he will get these done in January

## 2020-11-05 NOTE — TELEPHONE ENCOUNTER
We had hepatitis C antibody test done 2 years ago and is in my chart, so not needed to be repeated    I ordered the home sleep study which I think would be appropriate as well as lab work that he can do some after the start of the next yearDiagnoses and al

## 2020-11-09 ENCOUNTER — TELEPHONE (OUTPATIENT)
Dept: FAMILY MEDICINE CLINIC | Facility: CLINIC | Age: 60
End: 2020-11-09

## 2020-11-09 DIAGNOSIS — R07.0 THROAT DISCOMFORT: Primary | ICD-10-CM

## 2020-11-09 NOTE — TELEPHONE ENCOUNTER
TC to patient to get more information regarding need for otolaryngology. Asked patient to return my call.

## 2020-11-10 NOTE — TELEPHONE ENCOUNTER
Referral request Dr. Paul Peters M.D., ENT    [address] [place] [address] 9/14/2020 with Dr. Liv Amador M.D. Neck: Trachea normal and normal range of motion. Neck supple. Normal carotid pulses present. No edema present. No thyroid mass and no thyromegaly present.      Patient

## 2020-12-02 PROBLEM — M75.102 ROTATOR CUFF SYNDROME OF LEFT SHOULDER: Status: ACTIVE | Noted: 2020-12-02

## 2020-12-28 ENCOUNTER — OFFICE VISIT (OUTPATIENT)
Dept: SURGERY | Facility: CLINIC | Age: 60
End: 2020-12-28
Payer: MEDICARE

## 2020-12-28 VITALS — HEART RATE: 83 BPM | TEMPERATURE: 98 F | SYSTOLIC BLOOD PRESSURE: 120 MMHG | DIASTOLIC BLOOD PRESSURE: 87 MMHG

## 2020-12-28 DIAGNOSIS — R82.90 URINE FINDING: Primary | ICD-10-CM

## 2020-12-28 PROCEDURE — 53601 DILATE URETHRA STRICTURE: CPT | Performed by: UROLOGY

## 2020-12-28 PROCEDURE — 81003 URINALYSIS AUTO W/O SCOPE: CPT | Performed by: UROLOGY

## 2020-12-28 RX ORDER — CIPROFLOXACIN 500 MG/1
500 TABLET, FILM COATED ORAL ONCE
Status: COMPLETED | OUTPATIENT
Start: 2020-12-28 | End: 2020-12-28

## 2020-12-28 RX ADMIN — CIPROFLOXACIN 500 MG: 500 TABLET, FILM COATED ORAL at 11:00:00

## 2020-12-28 NOTE — PROGRESS NOTES
Rooming Clinician:     Uziel Marie. is a 61year old male. Patient presents with:   Follow - Up: meatal stricture        HPI:     And is a known meatal stricture which required dilation and then subsequent incision was supposed to be using urethral di chest pain on exertion  GI: denies abdominal pain and denies heartburn  : see HPI  NEURO: no sensory or motor complaint    EXAM:     There were no vitals taken for this visit.   GENERAL: well developed, well nourished,in no apparent distress  SKIN: no ivan

## 2020-12-29 ENCOUNTER — PATIENT MESSAGE (OUTPATIENT)
Dept: SURGERY | Facility: CLINIC | Age: 60
End: 2020-12-29

## 2020-12-30 NOTE — TELEPHONE ENCOUNTER
Below is patient's message sent via [employer]:     From: Oscar Melchor.   To: Geovanni Stone MD  Sent: 12/29/2020  6:16 PM CST  Subject: Test Results Question    I have a question about Urinalysis w/o scope resulted on 12/28/20, 10:56 AM. Just checking,

## 2021-03-29 ENCOUNTER — PROCEDURE (OUTPATIENT)
Dept: SURGERY | Facility: CLINIC | Age: 61
End: 2021-03-29
Payer: MEDICARE

## 2021-03-29 ENCOUNTER — PATIENT MESSAGE (OUTPATIENT)
Dept: SURGERY | Facility: CLINIC | Age: 61
End: 2021-03-29

## 2021-03-29 VITALS — DIASTOLIC BLOOD PRESSURE: 80 MMHG | SYSTOLIC BLOOD PRESSURE: 109 MMHG | HEART RATE: 80 BPM

## 2021-03-29 DIAGNOSIS — N35.911 STRICTURE OF URETHRAL MEATUS IN MALE, UNSPECIFIED STRICTURE TYPE: Primary | ICD-10-CM

## 2021-03-29 LAB
APPEARANCE: CLEAR
MULTISTIX LOT#: 5077 NUMERIC
PH, URINE: 5.5 (ref 4.5–8)
SPECIFIC GRAVITY: 1.03 (ref 1–1.03)
URINE-COLOR: YELLOW
UROBILINOGEN,SEMI-QN: 0.2 MG/DL (ref 0–1.9)

## 2021-03-29 PROCEDURE — 99213 OFFICE O/P EST LOW 20 MIN: CPT | Performed by: UROLOGY

## 2021-03-29 PROCEDURE — 53601 DILATE URETHRA STRICTURE: CPT | Performed by: UROLOGY

## 2021-03-29 PROCEDURE — 81003 URINALYSIS AUTO W/O SCOPE: CPT | Performed by: UROLOGY

## 2021-03-29 RX ORDER — CIPROFLOXACIN 500 MG/1
500 TABLET, FILM COATED ORAL ONCE
Status: COMPLETED | OUTPATIENT
Start: 2021-03-29 | End: 2021-03-29

## 2021-03-29 RX ADMIN — CIPROFLOXACIN 500 MG: 500 TABLET, FILM COATED ORAL at 12:01:00

## 2021-03-29 NOTE — PROGRESS NOTES
Rooming Clinician:     Yuridia Gil. is a 61year old male. Patient presents with: Follow - Up: possible dialtion        HPI:     Has history of a urethral meatal stricture which required incision and subsequent dilation.   Patient has not done any se motor complaint    EXAM:     /80 (BP Location: Right arm, Patient Position: Sitting, Cuff Size: large)   Pulse 80   GENERAL: well developed, well nourished,in no apparent distress  SKIN: no rashes,no suspicious lesions  HEENT: atraumatic, normocephal understanding of these issues and agrees to the plan. Mica Maldonado M.D.   Urology

## 2021-03-30 NOTE — TELEPHONE ENCOUNTER
From: Beena Garcia. To: Nick Burrell MD  Sent: 3/29/2021 5:39 PM CDT  Subject: Test Results Question    Are my urine sample results all good?   Thanks, [employer]

## 2021-04-15 ENCOUNTER — LAB ENCOUNTER (OUTPATIENT)
Dept: LAB | Age: 61
End: 2021-04-15
Attending: FAMILY MEDICINE
Payer: MEDICARE

## 2021-04-15 DIAGNOSIS — R73.9 HYPERGLYCEMIA: ICD-10-CM

## 2021-04-15 DIAGNOSIS — E78.2 MIXED HYPERLIPIDEMIA: ICD-10-CM

## 2021-04-15 PROCEDURE — 83036 HEMOGLOBIN GLYCOSYLATED A1C: CPT

## 2021-04-15 PROCEDURE — 80061 LIPID PANEL: CPT

## 2021-04-15 PROCEDURE — 36415 COLL VENOUS BLD VENIPUNCTURE: CPT

## 2021-04-15 PROCEDURE — 80053 COMPREHEN METABOLIC PANEL: CPT

## 2021-04-16 PROBLEM — R73.03 PREDIABETES: Status: ACTIVE | Noted: 2021-04-16

## 2021-04-16 PROBLEM — R73.9 HYPERGLYCEMIA: Status: RESOLVED | Noted: 2019-03-11 | Resolved: 2021-04-16

## 2021-04-16 PROBLEM — N35.119 POSTINFECTIVE URETHRAL STRICTURE IN MALE: Status: RESOLVED | Noted: 2020-03-23 | Resolved: 2021-04-16

## 2021-04-19 ENCOUNTER — OFFICE VISIT (OUTPATIENT)
Dept: FAMILY MEDICINE CLINIC | Facility: CLINIC | Age: 61
End: 2021-04-19
Payer: MEDICARE

## 2021-04-19 ENCOUNTER — TELEPHONE (OUTPATIENT)
Dept: FAMILY MEDICINE CLINIC | Facility: CLINIC | Age: 61
End: 2021-04-19

## 2021-04-19 VITALS
SYSTOLIC BLOOD PRESSURE: 120 MMHG | RESPIRATION RATE: 16 BRPM | HEIGHT: 68 IN | TEMPERATURE: 97 F | WEIGHT: 217.63 LBS | HEART RATE: 85 BPM | BODY MASS INDEX: 32.98 KG/M2 | DIASTOLIC BLOOD PRESSURE: 68 MMHG

## 2021-04-19 DIAGNOSIS — R73.03 PREDIABETES: Primary | ICD-10-CM

## 2021-04-19 DIAGNOSIS — E55.9 VITAMIN D DEFICIENCY: ICD-10-CM

## 2021-04-19 DIAGNOSIS — I10 ESSENTIAL HYPERTENSION: Primary | ICD-10-CM

## 2021-04-19 DIAGNOSIS — E78.2 MIXED HYPERLIPIDEMIA: ICD-10-CM

## 2021-04-19 DIAGNOSIS — Z00.00 LABORATORY EXAMINATION ORDERED AS PART OF A ROUTINE GENERAL MEDICAL EXAMINATION: ICD-10-CM

## 2021-04-19 DIAGNOSIS — Z12.5 SCREENING FOR PROSTATE CANCER: ICD-10-CM

## 2021-04-19 DIAGNOSIS — R73.03 PREDIABETES: ICD-10-CM

## 2021-04-19 DIAGNOSIS — R73.9 HYPERGLYCEMIA: ICD-10-CM

## 2021-04-19 DIAGNOSIS — R97.20 ELEVATED PSA, LESS THAN 10 NG/ML: ICD-10-CM

## 2021-04-19 PROCEDURE — 99214 OFFICE O/P EST MOD 30 MIN: CPT | Performed by: FAMILY MEDICINE

## 2021-04-19 NOTE — TELEPHONE ENCOUNTER
Please enter lab orders for the patient's upcoming physical appointment. Physical scheduled:    Your appointments     Date & Time Appointment Department [facility])    Oct 18, 2021 12:00 PM CDT Medicare Annual Well Visit with Jamshid Arango MD [facility]

## 2021-04-19 NOTE — PROGRESS NOTES
HPI/Subjective: Oscar Melchor. is a 61year old male who presents for recheck of his Pre-diabetes.  We have been following this elevated Blood sugars and patieint is doing well, losing about 15 lb with Therapuetic Lifestyle Change> .    had concerns in left side. Posterior tibial pulses are 2+ on the right side and 2+ on the left side. Heart sounds: Normal heart sounds. No murmur heard. Pulmonary:      Effort: Pulmonary effort is normal. No respiratory distress.       Breath sounds: Normal and agrees to the plan. 1. Essential hypertension (Primary)  Overview:  Lisinopril 10  Assessment & Plan:  Stable, Continue present management. Blood Pressure and Cardiac Medications          lisinopril 10 MG Oral Tab          2.  Mixed hyperlipidemia

## 2021-06-21 ENCOUNTER — TELEPHONE (OUTPATIENT)
Dept: SURGERY | Facility: CLINIC | Age: 61
End: 2021-06-21

## 2021-06-22 NOTE — TELEPHONE ENCOUNTER
Spoke with patient, we need to reschedule his appt . Call transferred to [facility].

## 2021-06-24 DIAGNOSIS — I10 ESSENTIAL HYPERTENSION: ICD-10-CM

## 2021-06-26 RX ORDER — LISINOPRIL 10 MG/1
TABLET ORAL
Qty: 90 TABLET | Refills: 0 | Status: SHIPPED | OUTPATIENT
Start: 2021-06-26 | End: 2021-11-23

## 2021-06-26 NOTE — TELEPHONE ENCOUNTER
Requested Prescriptions     Pending Prescriptions Disp Refills   • LISINOPRIL 10 MG Oral Tab [Pharmacy Med Name: LISINOPRIL  10MG  TAB] 90 tablet 3     Sig: TAKE 1 TABLET BY MOUTH ONCE DAILY     LOV 4/19/2021     Patient was asked to follow-up in: 6 months

## 2021-07-15 ENCOUNTER — PROCEDURE (OUTPATIENT)
Dept: SURGERY | Facility: CLINIC | Age: 61
End: 2021-07-15
Payer: MEDICARE

## 2021-07-15 DIAGNOSIS — N35.911 STRICTURE OF URETHRAL MEATUS IN MALE, UNSPECIFIED STRICTURE TYPE: ICD-10-CM

## 2021-07-15 DIAGNOSIS — R82.90 URINE FINDING: Primary | ICD-10-CM

## 2021-07-15 LAB
APPEARANCE: CLEAR
BILIRUBIN: NEGATIVE
GLUCOSE (URINE DIPSTICK): NEGATIVE MG/DL
KETONES (URINE DIPSTICK): NEGATIVE MG/DL
LEUKOCYTES: NEGATIVE
MULTISTIX LOT#: NORMAL NUMERIC
NITRITE, URINE: NEGATIVE
OCCULT BLOOD: NEGATIVE
PH, URINE: 6 (ref 4.5–8)
PROTEIN (URINE DIPSTICK): NEGATIVE MG/DL
SPECIFIC GRAVITY: 1.02 (ref 1–1.03)
URINE-COLOR: YELLOW
UROBILINOGEN,SEMI-QN: 0.2 MG/DL (ref 0–1.9)

## 2021-07-15 PROCEDURE — 81003 URINALYSIS AUTO W/O SCOPE: CPT | Performed by: UROLOGY

## 2021-07-15 PROCEDURE — 99213 OFFICE O/P EST LOW 20 MIN: CPT | Performed by: UROLOGY

## 2021-07-15 NOTE — PROGRESS NOTES
Rooming Clinician:     Coty Jacobs. is a 64year old male. Patient presents with: Follow - Up: Stricture of urethral meatus in male, unspecified stricture type        HPI:     Patient states that he is voiding well. Stream is good.   He does self di lesions  HEENT: atraumatic, normocephalic,ears and throat are clear  NECK: supple  LUNGS: normal respiratory motion without distress  CARDIO: normal peripheral perfusion  ABDOMEN: no masses,  tenderness, or hernia  : The penis is circumcised.   Meatus román

## 2021-09-13 NOTE — TELEPHONE ENCOUNTER
1. Prediabetes (Primary)  Overview:  A1c 5.8 in 2021  2. Vitamin D deficiency  -     Vitamin D, 25-Hydroxy; Future; Expected date: 09/13/2021  3. Elevated PSA, less than 10 ng/ml  Overview:  PSA 4.8 8/2017 with BPH sx. Down to 1.6 2/2018 without tx.   4. La

## 2021-10-07 ENCOUNTER — TELEPHONE (OUTPATIENT)
Dept: FAMILY MEDICINE CLINIC | Facility: CLINIC | Age: 61
End: 2021-10-07

## 2021-10-11 ENCOUNTER — LAB ENCOUNTER (OUTPATIENT)
Dept: LAB | Age: 61
End: 2021-10-11
Attending: FAMILY MEDICINE
Payer: MEDICARE

## 2021-10-11 DIAGNOSIS — Z00.00 LABORATORY EXAMINATION ORDERED AS PART OF A ROUTINE GENERAL MEDICAL EXAMINATION: ICD-10-CM

## 2021-10-11 DIAGNOSIS — Z12.5 SCREENING FOR PROSTATE CANCER: ICD-10-CM

## 2021-10-11 DIAGNOSIS — E55.9 VITAMIN D DEFICIENCY: ICD-10-CM

## 2021-10-11 DIAGNOSIS — R73.9 HYPERGLYCEMIA: ICD-10-CM

## 2021-10-11 PROCEDURE — 83036 HEMOGLOBIN GLYCOSYLATED A1C: CPT

## 2021-10-11 PROCEDURE — 84443 ASSAY THYROID STIM HORMONE: CPT

## 2021-10-11 PROCEDURE — 85027 COMPLETE CBC AUTOMATED: CPT

## 2021-10-11 PROCEDURE — 80061 LIPID PANEL: CPT

## 2021-10-11 PROCEDURE — 80053 COMPREHEN METABOLIC PANEL: CPT

## 2021-10-11 PROCEDURE — 36415 COLL VENOUS BLD VENIPUNCTURE: CPT

## 2021-10-11 PROCEDURE — 82306 VITAMIN D 25 HYDROXY: CPT

## 2021-10-18 ENCOUNTER — OFFICE VISIT (OUTPATIENT)
Dept: FAMILY MEDICINE CLINIC | Facility: CLINIC | Age: 61
End: 2021-10-18
Payer: MEDICARE

## 2021-10-18 VITALS
DIASTOLIC BLOOD PRESSURE: 60 MMHG | WEIGHT: 225 LBS | HEIGHT: 67.5 IN | RESPIRATION RATE: 20 BRPM | SYSTOLIC BLOOD PRESSURE: 110 MMHG | BODY MASS INDEX: 34.9 KG/M2 | HEART RATE: 68 BPM

## 2021-10-18 DIAGNOSIS — M53.3 SACRO-ILIAC PAIN: ICD-10-CM

## 2021-10-18 DIAGNOSIS — I10 ESSENTIAL HYPERTENSION: ICD-10-CM

## 2021-10-18 DIAGNOSIS — Z00.00 ANNUAL PHYSICAL EXAM: Primary | ICD-10-CM

## 2021-10-18 DIAGNOSIS — R73.03 PREDIABETES: ICD-10-CM

## 2021-10-18 DIAGNOSIS — E78.2 MIXED HYPERLIPIDEMIA: ICD-10-CM

## 2021-10-18 DIAGNOSIS — R97.20 ELEVATED PSA, LESS THAN 10 NG/ML: ICD-10-CM

## 2021-10-18 DIAGNOSIS — Z00.00 ENCOUNTER FOR ANNUAL HEALTH EXAMINATION: ICD-10-CM

## 2021-10-18 PROCEDURE — 99214 OFFICE O/P EST MOD 30 MIN: CPT | Performed by: FAMILY MEDICINE

## 2021-10-18 PROCEDURE — 3078F DIAST BP <80 MM HG: CPT | Performed by: FAMILY MEDICINE

## 2021-10-18 PROCEDURE — G0438 PPPS, INITIAL VISIT: HCPCS | Performed by: FAMILY MEDICINE

## 2021-10-18 PROCEDURE — 3074F SYST BP LT 130 MM HG: CPT | Performed by: FAMILY MEDICINE

## 2021-10-18 PROCEDURE — 3008F BODY MASS INDEX DOCD: CPT | Performed by: FAMILY MEDICINE

## 2021-10-18 NOTE — PROGRESS NOTES
HPI:   Yuridia Gil. is a 64year old male who presents for a Medicare Initial Annual Wellness visit (Once after 12 month Medicare anniversary) . Doing well, left SI joing pain ongoing 3 months. Gradually worse.    His last annual assessment has been history of colonic polyps     Benign neoplasm of transverse colon     Benign neoplasm of descending colon     Benign neoplasm of sigmoid colon     Rotator cuff syndrome of left shoulder     Prediabetes    Wt Readings from Last 3 Encounters:  10/18/21 : 225 smokeless tobacco. He reports current alcohol use. He reports that he does not use drugs. REVIEW OF SYSTEMS:   Review of Systems   Constitutional: Negative. Negative for fatigue, fever and unexpected weight change. HENT: Negative. Eyes: Negative. Cardiovascular:      Rate and Rhythm: Normal rate and regular rhythm. Pulses: Normal pulses. Carotid pulses are 2+ on the right side and 2+ on the left side. Radial pulses are 2+ on the right side and 2+ on the left side.         Dors physical exam (Primary)  -     OFFICE/OUTPT VISIT,EST,LEVL IV  2. Mixed hyperlipidemia  Overview:  LDL  with TLC  Assessment & Plan:  Stable, Continue present management. Orders:  -     OFFICE/OUTPT VISIT,EST,LEVL IV  3.  Essential hypertension more than 10 pounds without trying?: 2 - No  Has your appetite been poor?: No  How does the patient maintain a good energy level?: Appropriate Exercise  How would you describe your daily physical activity?: Moderate  How would you describe your current hea abnormal 11/03/2020    Colonoscopy due on 11/03/2023    Flexible Sigmoidoscopy   Covered every 4 years -    Fecal Occult Blood Test Covered annually -   Prostate Cancer Screening    Prostate-Specific Antigen (PSA) Annually Lab Results   Component Value Alex

## 2021-10-18 NOTE — PATIENT INSTRUCTIONS
Savage Davis Jr.'s SCREENING SCHEDULE   Tests on this list are recommended by your physician but may not be covered, or covered at this frequency, by your insurer. Please check with your insurance carrier before scheduling to verify coverage.    PREVEN 10/01/2021    Pneumococcal Each vaccine ([id number] & [id number]) covered once after 65 Prevnar 13: -    Rfnersfat87: -     No recommendations at this time    Hepatitis B One screening covered for patients with certain risk factors   -  No recommendations

## 2021-11-18 ENCOUNTER — PROCEDURE (OUTPATIENT)
Dept: SURGERY | Facility: CLINIC | Age: 61
End: 2021-11-18
Payer: MEDICARE

## 2021-11-18 DIAGNOSIS — N35.916 STRICTURE OF OVERLAPPING SITES OF URETHRA IN MALE, UNSPECIFIED STRICTURE TYPE: ICD-10-CM

## 2021-11-18 DIAGNOSIS — Q64.33 CONGENITAL MEATAL STENOSIS: ICD-10-CM

## 2021-11-18 DIAGNOSIS — R82.90 URINE FINDING: Primary | ICD-10-CM

## 2021-11-18 PROCEDURE — 81003 URINALYSIS AUTO W/O SCOPE: CPT | Performed by: UROLOGY

## 2021-11-18 PROCEDURE — 99213 OFFICE O/P EST LOW 20 MIN: CPT | Performed by: UROLOGY

## 2021-11-18 RX ORDER — CLOBETASOL PROPIONATE 0.5 MG/G
1 CREAM TOPICAL 2 TIMES DAILY
Qty: 1 EACH | Refills: 2 | Status: SHIPPED | OUTPATIENT
Start: 2021-11-18

## 2021-11-18 NOTE — PROGRESS NOTES
Rooming Clinician:     Susannah Dangelo. is a 64year old male. Patient presents with: Follow - Up: UC and UD        HPI:     Mr. Reno Sams returns for a follow-up visit.   He has a history of congenital meatal stenosis stricture of the fossa navicularis and well nourished,in no apparent distress  SKIN: no rashes,no suspicious lesions  HEENT: atraumatic, normocephalic,ears and throat are clear  NECK: supple  LUNGS: normal respiratory motion without distress  CARDIO: normal peripheral perfusion  ABDOMEN: no mas

## 2021-11-22 DIAGNOSIS — I10 ESSENTIAL HYPERTENSION: ICD-10-CM

## 2021-11-23 RX ORDER — LISINOPRIL 10 MG/1
TABLET ORAL
Qty: 90 TABLET | Refills: 3 | Status: SHIPPED | OUTPATIENT
Start: 2021-11-23

## 2021-11-23 NOTE — TELEPHONE ENCOUNTER
Requested Prescriptions     Pending Prescriptions Disp Refills   • LISINOPRIL 10 MG Oral Tab [Pharmacy Med Name: Lisinopril 10 MG Oral Tablet] 90 tablet 3     Sig: TAKE 1 TABLET BY MOUTH ONCE DAILY     LOV 10/18/2021     Patient was asked to follow-up in:

## 2021-12-16 ENCOUNTER — TELEPHONE (OUTPATIENT)
Dept: FAMILY MEDICINE CLINIC | Facility: CLINIC | Age: 61
End: 2021-12-16

## 2021-12-16 DIAGNOSIS — M53.3 SACRO-ILIAC PAIN: Primary | ICD-10-CM

## 2021-12-16 NOTE — TELEPHONE ENCOUNTER
Pt requesting a referral for Physical Therapy for his back. Still having issues. Wants to get in before the end of the year so that it's covered at 100 % this year.  (as long as he starts this year) Please advise

## 2021-12-17 NOTE — TELEPHONE ENCOUNTER
Dr. Hoda Shukla last office visit 10/18/21 you diagnosed patient with sacroiliac pain.   Okay to place referral for PT?

## 2021-12-17 NOTE — TELEPHONE ENCOUNTER
1. Sacro-iliac pain (Primary)  -     OP REFERRAL TO [facility]       OK to notify.  Thanks, Kelley Thompson MD

## 2021-12-22 ENCOUNTER — TELEPHONE (OUTPATIENT)
Dept: PHYSICAL THERAPY | Facility: HOSPITAL | Age: 61
End: 2021-12-22

## 2021-12-27 ENCOUNTER — OFFICE VISIT (OUTPATIENT)
Dept: PHYSICAL THERAPY | Facility: HOSPITAL | Age: 61
End: 2021-12-27
Attending: FAMILY MEDICINE
Payer: MEDICARE

## 2021-12-27 DIAGNOSIS — M53.3 SACRO-ILIAC PAIN: ICD-10-CM

## 2021-12-27 PROCEDURE — 97110 THERAPEUTIC EXERCISES: CPT

## 2021-12-27 PROCEDURE — 97162 PT EVAL MOD COMPLEX 30 MIN: CPT

## 2021-12-27 NOTE — PROGRESS NOTES
SPINE EVALUATION:   Referring Physician: Dr. Osito Perez  Diagnosis: SI joint pain.      Date of Service: 12/27/2021     PATIENT SUMMARY   Kathi Becerril. is a 64year old male who presents to therapy today with complaints of a long history of intermittent lo without reported pain. Skilled Physical Therapy is medically necessary to address the above impairments and reach functional goals. Precautions:  None  OBJECTIVE:   Observation/Posture: decreased lumbar lordosis; B pes planus, B genu varum.    Sit to st factors/comorbidities, 4+ body structures involved/activity limitations, and evolving symptoms including changing pain levels. Praveena Bradshaw PLAN OF CARE:    Goals: (to be met in 10 visits)   Sit to/from standing without UE assist and no pain.    Pt will report sleepi

## 2021-12-29 ENCOUNTER — OFFICE VISIT (OUTPATIENT)
Dept: PHYSICAL THERAPY | Facility: HOSPITAL | Age: 61
End: 2021-12-29
Attending: FAMILY MEDICINE
Payer: MEDICARE

## 2021-12-29 PROCEDURE — 97110 THERAPEUTIC EXERCISES: CPT

## 2021-12-29 NOTE — PROGRESS NOTES
Dx: SI pain, Low back pain. Insurance (Authorized # of Visits):  8           Authorizing Physician: Dr. Aleksandr Witt MD visit: none scheduled/tbd. ..   Fall Risk: standard         Precautions: n/a             Subjective: Low back pain is rated 7-8/10

## 2022-01-03 ENCOUNTER — OFFICE VISIT (OUTPATIENT)
Dept: PHYSICAL THERAPY | Facility: HOSPITAL | Age: 62
End: 2022-01-03
Attending: FAMILY MEDICINE
Payer: MEDICARE

## 2022-01-03 PROCEDURE — 97110 THERAPEUTIC EXERCISES: CPT

## 2022-01-03 RX ORDER — IBUPROFEN 600 MG/1
600 TABLET ORAL EVERY 8 HOURS PRN
Qty: 90 TABLET | Refills: 3 | Status: SHIPPED | OUTPATIENT
Start: 2022-01-03

## 2022-01-03 NOTE — TELEPHONE ENCOUNTER
Dr. Courtney Barron called patient to let him know we had entered referral for PT. Patient has already gone to two sessions of PT. He was wondering if he could get a refill on his Ibuprofen 600 mg #90 that he takes for his occasional knee pain with one refill.  He w

## 2022-01-03 NOTE — TELEPHONE ENCOUNTER
Please notify:    Requested Prescriptions     Signed Prescriptions Disp Refills   • ibuprofen 600 MG Oral Tab 90 tablet 3     Sig: Take 1 tablet (600 mg total) by mouth every 8 (eight) hours as needed for Pain.      Authorizing Provider: Genie Tobias

## 2022-01-03 NOTE — PROGRESS NOTES
Dx: SI pain, Low back pain. Insurance (Authorized # of Visits):  8           Authorizing Physician: Dr. Daryn Calix  Next MD visit: none scheduled/tbd. ..   Fall Risk: standard         Precautions: n/a             Subjective: Low back pain is rated 5-6/10 reps. .       TNE was given throughout today's session. HEP: PPT, LTR, supine piriformis stretch, seated sciatic tensioners.      Charges: Ex3       Total Timed Treatment: 42 min  Total Treatment Time: 42 min

## 2022-01-05 ENCOUNTER — OFFICE VISIT (OUTPATIENT)
Dept: PHYSICAL THERAPY | Facility: HOSPITAL | Age: 62
End: 2022-01-05
Attending: FAMILY MEDICINE
Payer: MEDICARE

## 2022-01-05 PROCEDURE — 97110 THERAPEUTIC EXERCISES: CPT

## 2022-01-05 NOTE — PROGRESS NOTES
Dx: SI pain, Low back pain. Insurance (Authorized # of Visits):  8           Authorizing Physician: Dr. Aimee Clark  Next MD visit: none scheduled/tbd. ..   Fall Risk: standard         Precautions: n/a             Subjective: Low back pain is rated 5-6/10 X 15  X 15 l/r. Neena Carmona Seated sciatic tensioners x 20 l/r. . X 20 each leg. X 10 l/r. Wide balance board a/p x 20.  - Wooden balance board a/p x 20.      4\" :[employer] x 10 l/r. . -  4\" [employer] x 10 each leg. Supine clam shells green band x 20 reps. . With b

## 2022-01-10 ENCOUNTER — OFFICE VISIT (OUTPATIENT)
Dept: PHYSICAL THERAPY | Facility: HOSPITAL | Age: 62
End: 2022-01-10
Attending: FAMILY MEDICINE
Payer: MEDICARE

## 2022-01-10 PROCEDURE — 97110 THERAPEUTIC EXERCISES: CPT

## 2022-01-10 NOTE — PROGRESS NOTES
Dx: SI pain, Low back pain. Insurance (Authorized # of Visits):  8           Authorizing Physician: Dr. Erika Gonzalez  Next MD visit: none scheduled/tbd. ..   Fall Risk: standard         Precautions: n/a             Subjective: notes experiencing left ow pancho with SB assist for 1 minute each leg. X.    PPT while hook lying with ball squeezing 5 second holds x 10 reps for 2 sets. 2 x 10. X 15 reps. . X 15 reps. Heddy Kluver BLFOs x 10 l/r. Heddy Kluver X 15  X 15 l/r. Heddy Kluver X 15 l/r. Heddy Kluver Seated sciatic tensioners x 20 l/r. . X 20 each l

## 2022-01-13 ENCOUNTER — OFFICE VISIT (OUTPATIENT)
Dept: PHYSICAL THERAPY | Facility: HOSPITAL | Age: 62
End: 2022-01-13
Attending: FAMILY MEDICINE
Payer: MEDICARE

## 2022-01-13 PROCEDURE — 97110 THERAPEUTIC EXERCISES: CPT

## 2022-01-13 NOTE — PROGRESS NOTES
Dx: SI pain, Low back pain. Insurance (Authorized # of Visits):  8           Authorizing Physician: Dr. Mignon Maharaj  Next MD visit: none scheduled/tbd. ..   Fall Risk: standard         Precautions: n/a             Subjective:  Notes experiencing left low b leg.  By PT with SB assist for 1 minute each leg. X. Supine clam shells green band x 20 reps. Sánchez Aguilar PPT while hook lying with ball squeezing 5 second holds x 10 reps for 2 sets. 2 x 10. X 15 reps. . X 15 reps. . X 15 reps. BLFOs x 10 l/r. Sánchez Aguilar  X 15  X

## 2022-01-19 ENCOUNTER — OFFICE VISIT (OUTPATIENT)
Dept: PHYSICAL THERAPY | Facility: HOSPITAL | Age: 62
End: 2022-01-19
Attending: FAMILY MEDICINE
Payer: MEDICARE

## 2022-01-19 PROCEDURE — 97110 THERAPEUTIC EXERCISES: CPT

## 2022-01-19 NOTE — PROGRESS NOTES
Dx: SI pain, Low back pain. Insurance (Authorized # of Visits):  8           Authorizing Physician: Dr. Divya London  Next MD visit: none scheduled/tbd. ..   Fall Risk: standard         Precautions: n/a             Subjective:  Notes experiencing left low b Supine piriformis stretching by PT with SB use 30 second holds x 2 each leg. For 1 minute each leg. By PT with SB assist for 1 minute each leg. X. Supine clam shells green band x 20 reps. . With blue band 2 x 10.        PPT while hook lying with ball

## 2022-01-21 ENCOUNTER — OFFICE VISIT (OUTPATIENT)
Dept: PHYSICAL THERAPY | Facility: HOSPITAL | Age: 62
End: 2022-01-21
Attending: FAMILY MEDICINE
Payer: MEDICARE

## 2022-01-21 PROCEDURE — 97110 THERAPEUTIC EXERCISES: CPT

## 2022-01-21 NOTE — PROGRESS NOTES
Dx: SI pain, Low back pain. Insurance (Authorized # of Visits):  8           Authorizing Physician: Dr. Mignon Maharaj  Next MD visit: none scheduled/tbd. ..   Fall Risk: standard         Precautions: n/a             Subjective:  Notes experiencing left low b Bridging with calves on SB 2 x 10.  2 x 10. X 20. X 20. X 15. X 20. X 20. Supine piriformis stretching by PT with SB use 30 second holds x 2 each leg. For 1 minute each leg. By PT with SB assist for 1 minute each leg.   X. Supine clam shells g

## 2022-01-26 ENCOUNTER — OFFICE VISIT (OUTPATIENT)
Dept: PHYSICAL THERAPY | Facility: HOSPITAL | Age: 62
End: 2022-01-26
Attending: FAMILY MEDICINE
Payer: MEDICARE

## 2022-01-26 PROCEDURE — 97110 THERAPEUTIC EXERCISES: CPT

## 2022-01-26 NOTE — PROGRESS NOTES
Dx: SI pain, Low back pain. Insurance (Authorized # of Visits):  8           Authorizing Physician: Dr. Kristian Manuel  Next MD visit: none scheduled/tbd. ..   Fall Risk: standard         Precautions: n/a             Subjective:  Notes experiencing left low b Is This A New Presentation, Or A Follow-Up?: Skin Lesion How Severe Is Your Skin Lesion?: mild reps.. X 20 l/r. . X 20 l/r. . X 20 l/r. .    Bridging with calves on SB 2 x 10.  2 x 10. X 20. X 20. X 15. X 20. X 20. X 20     Supine piriformis stretching by PT with SB use 30 second holds x 2 each leg. For 1 minute each leg.   By PT with SB assist f Has Your Skin Lesion Been Treated?: not been treated

## 2022-01-28 ENCOUNTER — OFFICE VISIT (OUTPATIENT)
Dept: PHYSICAL THERAPY | Facility: HOSPITAL | Age: 62
End: 2022-01-28
Attending: FAMILY MEDICINE
Payer: MEDICARE

## 2022-01-28 PROCEDURE — 97110 THERAPEUTIC EXERCISES: CPT

## 2022-01-28 NOTE — PROGRESS NOTES
Dx: SI pain, Low back pain. Insurance (Authorized # of Visits):  10 initially. .. Authorizing Physician: Dr. William Whatley  Next MD visit:  4/18/22. ..   Fall Risk: standard         Precautions: n/a         Progress Summary  Pt has attended 10 visits skilled Physical Therapy 1-2 x/week or a total of 8 additional visits over a 90 day period. Treatment will continue to include:  manual therapy prn, education, LE/core strengthening.       Thank you for your referral.   If you have any questions, please c HEP.  Supine piriformis stretching by PT X. Supine piriformis stretch by PT left and right for 45 second holds each. .. With SB assist for 30 seconds each leg. Seated sciatic tensioners x 20 l/r. . X 20 each leg. X 10 l/r. X 10 l/r. . X 10 each leg.   2 x

## 2022-02-16 ENCOUNTER — OFFICE VISIT (OUTPATIENT)
Dept: PHYSICAL THERAPY | Facility: HOSPITAL | Age: 62
End: 2022-02-16
Attending: FAMILY MEDICINE
Payer: MEDICARE

## 2022-02-16 PROCEDURE — 97110 THERAPEUTIC EXERCISES: CPT

## 2022-02-16 NOTE — PROGRESS NOTES
Dx: SI pain, Low back pain. Insurance (Authorized # of Visits):  10 initially. .. Authorizing Physician: Dr. Karina Zarate  Next MD visit:  4/18/22. .. Fall Risk: standard         Precautions: n/a                Subjective:  Notes experiencing intermittent left low back pain that ranges from 3-7/10. Notes feeling 10% better since last PT session. Denies any LE radicular symptoms. Reports that he has difficulty sleeping. Getting out of bed each morning is a little better. Notes the most pain when bending forward; especially when sitting. Notes being less fidgety when sitting on his sofa, and notes walking better. Notes that his knees and his ankles talk to him intermittently. Is not taking any pain meds or NSAIDs currently. Has not used heat or cold recently. Objective:  Sit to standing with UE assist and some pain. Gait is at a normal pace with normal stride lengths. TL ROM:  Flexion 85 with pain. Extension 15 no pain. Supine hip flexion has improved to 115 B. Bob Lipoma Knee flexion has improved:  R 122. L 125. DF is now 10 B. .  6\" ASU with improved power and control. Assessment: Is moving marginally better overall. Pain is reportedly marginally less. Goals:  (to be met in 10 visits)   Sit to/from standing without UE assist and no pain. Progress. Pt will report sleeping at least 6 hours undisturbed by pain; and report less pain when getting out of bed each morning. Updated. Pt will demonstrate neutral spine posture and report no pain when bending forward with his ADLs. Progress. Pt will be independent with an upgraded HEP. Ongoing; being met. Plan: Continue skilled Physical Therapy 1-2 x/week or a total of 8 additional visits over a 90 day period. Treatment will continue to include:  manual therapy prn, education, LE/core strengthening.            Date:   1/3/22             TX#: 3/10 Date:  1/5/22              TX#: 4/10 Date:  1/10/22 TX#: 5/10 Date: 1/13/22  Tx#: 6/10 1/19/22  #7/10 1/21/22  #8/10 1/26/22  #9/10 1/28/22  #10/10 2/16/22  #1/8 add'l... NuStep load 6 for 6 minutes. Load 6 for 6 minutes. Load 6 for 6 minutes. Load 6 for 6 minutes. NuStep (10/11) load 6 for 6 minutes. Load 6 for 6 minutes. NuStep load 6 for 6 minutes. Load 6 for 6 minutes. Load 6 for 6 minutes. X 20 reps. . X 20 reps. . X 20  X 20 reps. . X 20 reps. . DKTC with SB x 20. X 20 reps. . X 20 reps. . DKTC with SB x 20 reps. .    X 20 l/r. . X 20 l/r. . X 20. X 20 reps. . X 20 l/r. . X 20 l/r. . X 20 l/r. . X 20 l/r. Mariellen Caryn LTR with calves on SB x 20 l/r. Mariellen Caryn 2 x 10. X 20. X 20. X 15. X 20. X 20. X 20  X 20 Bridging with calves on SB x 10 reps. .    For 1 minute each leg. By PT with SB assist for 1 minute each leg. X. Supine clam shells green band x 20 reps. . With blue band 2 x 10. X 25 reps. . X 25 reps. . HEP. 3 x 8.     2 x 10. X 15 reps. . X 15 reps. . X 15 reps. X 15 reps. .. X 15. X 15 reps. . X 15 reps. Mariellen Caryn PPT while hook lying with ball squeezing 5 second holds x 15 reps. .    X 15  X 15 l/r. Mariellen Caryn X 15 l/r. Mariellen Caryn HEP. Supine piriformis stretching by PT X. Supine piriformis stretch by PT left and right for 45 second holds each. .. With SB assist for 30 seconds each leg. By PT for 30 seconds x 2 each leg. X 20 each leg. X 10 l/r. X 10 l/r. . X 10 each leg. 2 x 10 l/r. Leolageovanna Caryn 2 x 10 each leg. X 10 l/r. . - X 10 l/r. .    - Wooden balance board a/p x 20. X 20 reps. . X 25. A/P x 20. A/P x 20. A/P x 20. A/P x 25. A/P x 25.     -  4\" LSU x 10 each leg. 6\" LSU x 10 each leg. X 10 each leg. X 10 each leg. 6\" LSU x 10 each leg. X 10 each leg. X 10 reps. . 6\" L[facility] x 10 l/r. .    Supine clam shells green band x 20 reps. . With blue band x 20 reps. . X 20. See above. - - Seated TF stretching with SB use x 10 reps; and as HEP.  HEP.  HEP. TNE was given throughout today's session. While prone:   stm t-l-s area by PT. Hip PROM by PT.   While prone:   stm t-l-s area by PT.  Hip PROM l/r by PT. While prone:    stm t-l-s area by PT. TL  by PT. Hip PROM l/r by PT. While prone:   stm t-l-s area by PT. TL  by PT. Hip PROM l/r by PT. Prone:    stm t-l-s area by PT. TL  by PT. Hip PROM l/r by PT. While prone:    stm t-l-s area by PT. TL  by PT. Hip PROM by PT.  - While prone:    stm t-l-s area by PT. TL  by PT. Hip PROM by PT.     HEP: PPT, LTR, supine piriformis stretch, seated sciatic tensioners.  hip    Charges: Ex3       Total Timed Treatment: 45 min  Total Treatment Time: 45 min

## 2022-02-23 ENCOUNTER — OFFICE VISIT (OUTPATIENT)
Dept: PHYSICAL THERAPY | Facility: HOSPITAL | Age: 62
End: 2022-02-23
Attending: FAMILY MEDICINE
Payer: MEDICARE

## 2022-02-23 PROCEDURE — 97110 THERAPEUTIC EXERCISES: CPT

## 2022-02-23 NOTE — PROGRESS NOTES
Dx: SI pain, Low back pain. Insurance (Authorized # of Visits):  10 initially. .. Authorizing Physician: Dr. Mignon Maharaj  Next MD visit:  4/18/22. .. Fall Risk: standard         Precautions: n/a              Subjective:  Notes experiencing intermittent left low back pain that ranges from 0-4/10. Denies any LE radicular symptoms. Notes the most pain when bending forward; especially when sitting. Has reportedly been a little less overly active with his car condo projects. Notes being less fidgety when sitting on his sofa, and notes walking better. Notes that his knees and his ankles talk to him intermittently. Is not taking any pain meds or NSAIDs currently. Has not used heat or cold recently. Objective:  Sit to standing with UE assist and some pain. Gait is at a normal pace with normal stride lengths. 6\" ASU with improved power and control. Assessment: Is moving marginally better overall. Pain is reportedly marginally less. Goals:  (to be met in 10 visits)   Sit to/from standing without UE assist and no pain. Progress. Pt will report sleeping at least 6 hours undisturbed by pain; and report less pain when getting out of bed each morning. Updated. Pt will demonstrate neutral spine posture and report no pain when bending forward with his ADLs. Progress. Pt will be independent with an upgraded HEP. Ongoing; being met. Plan: Continue skilled Physical Therapy 1-2 x/week or a total of 8 additional visits over a 90 day period. Treatment will continue to include:  manual therapy prn, education, LE/core strengthening. Date:   1/3/22             TX#: 3/10 Date:  1/5/22              TX#: 4/10 Date:  1/10/22               TX#: 5/10 Date: 1/13/22  Tx#: 6/10 1/19/22  #7/10 1/21/22  #8/10 1/26/22  #9/10 1/28/22  #10/10 2/16/22  #1/8 add'l... 2/23/22  #2/8 add'l rxs. .. NuStep load 6 for 6 minutes. Load 6 for 6 minutes. Load 6 for 6 minutes.   Load 6 for 6 minutes. NuStep (10/11) load 6 for 6 minutes. Load 6 for 6 minutes. NuStep load 6 for 6 minutes. Load 6 for 6 minutes. Load 6 for 6 minutes. NuStep load 6 for 6 minutes. X 20 reps. . X 20 reps. . X 20  X 20 reps. . X 20 reps. . DKTC with SB x 20. X 20 reps. . X 20 reps. . DKTC with SB x 20 reps. . DKTC with SB x 20 reps. .   X 20 l/r. . X 20 l/r. . X 20. X 20 reps. . X 20 l/r. . X 20 l/r. . X 20 l/r. . X 20 l/r. Jt Breech LTR with calves on SB x 20 l/r. Jt Breech LTR with calves on SB x 20.   2 x 10. X 20. X 20. X 15. X 20. X 20. X 20  X 20 Bridging with calves on SB x 10 reps. . 2 x 10. For 1 minute each leg. By PT with SB assist for 1 minute each leg. X. Supine clam shells green band x 20 reps. . With blue band 2 x 10. X 25 reps. . X 25 reps. . HEP. 3 x 8. X.   2 x 10. X 15 reps. . X 15 reps. . X 15 reps. X 15 reps. .. X 15. X 15 reps. . X 15 reps. Jt Breech PPT while hook lying with ball squeezing 5 second holds x 15 reps. . X 15 reps. .   X 15  X 15 l/r. Jt Breech X 15 l/r. Jt Breech HEP. Supine piriformis stretching by PT X. Supine piriformis stretch by PT left and right for 45 second holds each. .. With SB assist for 30 seconds each leg. By PT for 30 seconds x 2 each leg. X.   X 20 each leg. X 10 l/r. X 10 l/r. . X 10 each leg. 2 x 10 l/r. Jt Breech 2 x 10 each leg. X 10 l/r. . - X 10 l/r. . X 10 l/r. .   - Wooden balance board a/p x 20. X 20 reps. . X 25. A/P x 20. A/P x 20. A/P x 20. A/P x 25. A/P x 25. X.   -  4\" L[facility] x 10 each leg. 6\" LSU x 10 each leg. X 10 each leg. X 10 each leg. 6\" L[facility] x 10 each leg. X 10 each leg. X 10 reps. . 6\" LSU x 10 l/r. Jt Breech X 12 each leg. Supine clam shells green band x 20 reps. . With blue band x 20 reps. . X 20. See above. - - Seated TF stretching with SB use x 10 reps; and as HEP.  HEP.  HEP.  HEP. TNE was given throughout today's session. While prone:   stm t-l-s area by PT. Hip PROM by PT. While prone:   stm t-l-s area by PT. Hip PROM l/r by PT. While prone:    stm t-l-s area by PT.    TL  by PT. Hip PROM l/r by PT. While prone:   stm t-l-s area by PT. TL  by PT. Hip PROM l/r by PT. Prone:    stm t-l-s area by PT. TL  by PT. Hip PROM l/r by PT. While prone:    stm t-l-s area by PT. TL  by PT. Hip PROM by PT.  - While prone:    stm t-l-s area by PT. TL  by PT. Hip PROM by PT. While prone:    stm t-l-s area by PT. Hip PROM l/r by PT. TL  by PT.    HEP: PPT, LTR, supine piriformis stretch, seated sciatic tensioners.  hip    Charges: Ex3       Total Timed Treatment: 43 min  Total Treatment Time: 43 min

## 2022-03-02 ENCOUNTER — OFFICE VISIT (OUTPATIENT)
Dept: PHYSICAL THERAPY | Facility: HOSPITAL | Age: 62
End: 2022-03-02
Attending: FAMILY MEDICINE
Payer: MEDICARE

## 2022-03-02 PROCEDURE — 97110 THERAPEUTIC EXERCISES: CPT

## 2022-03-02 NOTE — PROGRESS NOTES
Dx: SI pain, Low back pain. Insurance (Authorized # of Visits):  10 initially. .. Authorizing Physician: Dr. Jeramie Echeverria  Next MD visit:  4/18/22. .. Fall Risk: standard         Precautions: n/a              Subjective:  Notes experiencing intermittent left low back pain that ranges from 0-4/10. His back has felt stiff and sore the past day or so. Denies any LE radicular symptoms. Notes the most pain when bending forward; especially when sitting, and when side bending to the left. Notes that his knees and his ankles talk to him intermittently. Is not taking any pain meds or NSAIDs currently. Has not used heat or cold recently. Objective:  Sit to standing with UE assist and some pain. Gait is at a normal pace with normal stride lengths. 6\" ASU with improved power and control. Assessment: Is moving better overall. Goals:  (to be met in 10 visits)   Sit to/from standing without UE assist and no pain. Progress. Pt will report sleeping at least 6 hours undisturbed by pain; and report less pain when getting out of bed each morning. Updated. Pt will demonstrate neutral spine posture and report no pain when bending forward with his ADLs. Progress. Pt will be independent with an upgraded HEP. Ongoing; being met. Plan: Continue skilled Physical Therapy 1-2 x/week or a total of 8 additional visits over a 90 day period. Treatment will continue to include:  manual therapy prn, education, LE/core strengthening. Date: 1/13/22  Tx#: 6/10 1/19/22  #7/10 1/21/22  #8/10 1/26/22  #9/10 1/28/22  #10/10 2/16/22  #1/8 add'l... 2/23/22  #2/8 add'l rxs. .. 3/2/22  #3    Load 6 for 6 minutes. NuStep (10/11) load 6 for 6 minutes. Load 6 for 6 minutes. NuStep load 6 for 6 minutes. Load 6 for 6 minutes. Load 6 for 6 minutes. NuStep load 6 for 6 minutes. NuStep load 6 for 6 minutes. X 20 reps. . X 20 reps. . DKTC with SB x 20. X 20 reps. . X 20 reps. . DKTC with SB x 20 reps. . DKTC with SB x 20 reps. . X 20 reps. .     X 20 reps. . X 20 l/r. . X 20 l/r. . X 20 l/r. . X 20 l/r. [place] Row LTR with calves on SB x 20 l/r. [place] LTR with calves on SB x 20. X 20 l/r. .    X 15. X 20. X 20. X 20  X 20 Bridging with calves on SB x 10 reps. . 2 x 10.  2 x 10. Supine clam shells green band x 20 reps. . With blue band 2 x 10. X 25 reps. . X 25 reps. . HEP. 3 x 8. X. 3 x 10. X 15 reps. X 15 reps. .. X 15. X 15 reps. . X 15 reps. [place] Row PPT while hook lying with ball squeezing 5 second holds x 15 reps. . X 15 reps. . X 15 reps. .    HEP. Supine piriformis stretching by PT X. Supine piriformis stretch by PT left and right for 45 second holds each. .. With SB assist for 30 seconds each leg. By PT for 30 seconds x 2 each leg. X. X.    X 10 each leg. 2 x 10 l/r. [place] Row 2 x 10 each leg. X 10 l/r. . - X 10 l/r. . X 10 l/r. [place] Row Seated sciatic tensioners x 10 l/r. .    X 25. A/P x 20. A/P x 20. A/P x 20. A/P x 25. A/P x 25. X. Wooden balance board a/p x 25. X 10 each leg. X 10 each leg. 6\" LSU x 10 each leg. X 10 each leg. X 10 reps. . 6\" LSU x 10 l/r. [place] Row X 12 each leg. 6\" LSU x 12 l/r. [place] See above. - - Seated TF stretching with SB use x 10 reps; and as HEP.  HEP.  HEP.  HEP. -    While prone:    stm t-l-s area by PT. TL  by PT. Hip PROM l/r by PT. While prone:   stm t-l-s area by PT. TL  by PT. Hip PROM l/r by PT. Prone:    stm t-l-s area by PT. TL  by PT. Hip PROM l/r by PT. While prone:    stm t-l-s area by PT. TL  by PT. Hip PROM by PT.  - While prone:    stm t-l-s area by PT. TL  by PT. Hip PROM by PT. While prone:    stm t-l-s area by PT. Hip PROM l/r by PT. TL  by PT. While prone:    stm t-l-s area by PT. TL  by PT. Hip PROM l/r by PT.     HEP: PPT, LTR, supine piriformis stretch, seated sciatic tensioners.  hip    Charges: Ex3       Total Timed Treatment: 43 min  Total Treatment Time: 43 min

## 2022-03-09 ENCOUNTER — OFFICE VISIT (OUTPATIENT)
Dept: PHYSICAL THERAPY | Facility: HOSPITAL | Age: 62
End: 2022-03-09
Attending: FAMILY MEDICINE
Payer: MEDICARE

## 2022-03-09 PROCEDURE — 97110 THERAPEUTIC EXERCISES: CPT

## 2022-03-09 NOTE — PROGRESS NOTES
Dx: SI pain, Low back pain. Insurance (Authorized # of Visits):  10 initially. .. Authorizing Physician: Dr. Divya Witt MD visit:  4/18/22. .. Fall Risk: standard         Precautions: n/a              Subjective:  Notes experiencing intermittent and localized left low back pain that ranges from 0-5/10. Notes feeling stiffness as well as some pain. Denies any LE radicular symptoms. Notes the most pain when bending forward. Notes that his knees and his ankles talk to him intermittently. Is not taking any pain meds or NSAIDs currently. Has not used heat or cold recently. Objective:  Sit to standing with UE assist and some pain. Gait is at a normal pace with normal stride lengths. TL ROM while standing:  Flexion 85 with low back pain. Extension 15 with low back pain. 6\" ASU with improved power and control. Assessment: Is moving better overall. Left low back pain is persistent. Goals:  (to be met in 10 visits)   Sit to/from standing without UE assist and no pain. Progress. Pt will report sleeping at least 6 hours undisturbed by pain; and report less pain when getting out of bed each morning. Updated. Pt will demonstrate neutral spine posture and report no pain when bending forward with his ADLs. Progress. Pt will be independent with an upgraded HEP. Ongoing; being met. Plan: Continue skilled Physical Therapy 1-2 x/week or a total of 8 additional visits over a 90 day period. Treatment will continue to include:  manual therapy prn, education, LE/core strengthening. Date: 1/13/22  Tx#: 6/10 1/19/22  #7/10 1/21/22  #8/10 1/26/22  #9/10 1/28/22  #10/10 2/16/22  #1/8 add'l... 2/23/22  #2/8 add'l rxs. .. 3/2/22  #3 3/9/22  #4 add'l... Load 6 for 6 minutes. NuStep (10/11) load 6 for 6 minutes. Load 6 for 6 minutes. NuStep load 6 for 6 minutes. Load 6 for 6 minutes. Load 6 for 6 minutes. NuStep load 6 for 6 minutes. NuStep load 6 for 6 minutes. NuStep load 6 for 6 minutes. X 20 reps. . X 20 reps. . DKTC with SB x 20. X 20 reps. . X 20 reps. . DKTC with SB x 20 reps. . DKTC with SB x 20 reps. . X 20 reps. .  X 20 reps. .   X 20 reps. . X 20 l/r. . X 20 l/r. . X 20 l/r. . X 20 l/r. Genie Renzo LTR with calves on SB x 20 l/r. Genie Renzo LTR with calves on SB x 20. X 20 l/r. . X 20 l/r. .   X 15. X 20. X 20. X 20  X 20 Bridging with calves on SB x 10 reps. . 2 x 10.  2 x 10.  2 x 10. Supine clam shells green band x 20 reps. . With blue band 2 x 10. X 25 reps. . X 25 reps. . HEP. 3 x 8. X. 3 x 10.  3 x 10. X 15 reps. X 15 reps. .. X 15. X 15 reps. . X 15 reps. Genie Renzo PPT while hook lying with ball squeezing 5 second holds x 15 reps. . X 15 reps. . X 15 reps. . X 15 reps. .   HEP. Supine piriformis stretching by PT X. Supine piriformis stretch by PT left and right for 45 second holds each. .. With SB assist for 30 seconds each leg. By PT for 30 seconds x 2 each leg. X. X. By PT for 1 minute each leg. X 10 each leg. 2 x 10 l/r. Genie Renzo 2 x 10 each leg. X 10 l/r. . - X 10 l/r. . X 10 l/r. Genie Renzo Seated sciatic tensioners x 10 l/r. Genie Renzo Seated sciatic tensioners x 10 l/r. .   X 25. A/P x 20. A/P x 20. A/P x 20. A/P x 25. A/P x 25. X. Wooden balance board a/p x 25. Wooden balance board a/p x 30. X 10 each leg. X 10 each leg. 6\" [facility] x 10 each leg. X 10 each leg. X 10 reps. . 6\" [facility] x 10 l/r. Genie Renzo X 12 each leg. 6\" [facility] x 12 l/r. Genie Renzo 6\" [facility] x 12 l/r. Genie Renzo See above. - - Seated TF stretching with SB use x 10 reps; and as HEP.  HEP.  HEP.  HEP.  - HEP. While prone:    stm t-l-s area by PT. TL  by PT. Hip PROM l/r by PT. While prone:   stm t-l-s area by PT. TL  by PT. Hip PROM l/r by PT. Prone:    stm t-l-s area by PT. TL  by PT. Hip PROM l/r by PT. While prone:    stm t-l-s area by PT. TL  by PT. Hip PROM by PT.  - While prone:    stm t-l-s area by PT. TL  by PT. Hip PROM by PT. While prone:    stm t-l-s area by PT. Hip PROM l/r by PT. TL  by PT.   While prone:    stm t-l-s area by PT. TL  by PT. Hip PROM l/r by PT. While prone:    stm t-l-s area by PT. Hip PROM l/r by PT. TL  by PT.    HEP: PPT, LTR, supine piriformis stretch, seated sciatic tensioners.  hip    Charges: Ex3       Total Timed Treatment: 43 min  Total Treatment Time: 43 min

## 2022-03-17 ENCOUNTER — PROCEDURE (OUTPATIENT)
Dept: SURGERY | Facility: CLINIC | Age: 62
End: 2022-03-17
Payer: MEDICARE

## 2022-03-17 DIAGNOSIS — Q64.33 CONGENITAL MEATAL STENOSIS: ICD-10-CM

## 2022-03-17 DIAGNOSIS — R82.90 URINE FINDING: Primary | ICD-10-CM

## 2022-03-17 DIAGNOSIS — N35.819 OTHER STRICTURE OF URETHRA IN MALE: ICD-10-CM

## 2022-03-17 LAB
APPEARANCE: CLEAR
BILIRUBIN: NEGATIVE
GLUCOSE (URINE DIPSTICK): NEGATIVE MG/DL
KETONES (URINE DIPSTICK): NEGATIVE MG/DL
MULTISTIX LOT#: ABNORMAL NUMERIC
NITRITE, URINE: NEGATIVE
PH, URINE: 5.5 (ref 4.5–8)
PROTEIN (URINE DIPSTICK): NEGATIVE MG/DL
SPECIFIC GRAVITY: 1.02 (ref 1–1.03)
URINE-COLOR: YELLOW
UROBILINOGEN,SEMI-QN: 0.2 MG/DL (ref 0–1.9)

## 2022-03-17 PROCEDURE — 81003 URINALYSIS AUTO W/O SCOPE: CPT | Performed by: UROLOGY

## 2022-03-17 PROCEDURE — 99213 OFFICE O/P EST LOW 20 MIN: CPT | Performed by: UROLOGY

## 2022-03-22 ENCOUNTER — PATIENT MESSAGE (OUTPATIENT)
Dept: SURGERY | Facility: CLINIC | Age: 62
End: 2022-03-22

## 2022-03-23 ENCOUNTER — PATIENT MESSAGE (OUTPATIENT)
Dept: SURGERY | Facility: CLINIC | Age: 62
End: 2022-03-23

## 2022-03-23 ENCOUNTER — APPOINTMENT (OUTPATIENT)
Dept: PHYSICAL THERAPY | Facility: HOSPITAL | Age: 62
End: 2022-03-23
Attending: FAMILY MEDICINE
Payer: MEDICARE

## 2022-03-23 NOTE — TELEPHONE ENCOUNTER
From: Indio Florez. To: Aziza Marie MD  Sent: 3/22/2022 10:23 PM CDT  Subject: Question regarding Urinalysis w/o scope    I noticed that my results for Evelyn Peal was Trace-intact. Should I be concerned as Sushma Simpson never seen this result previously? Question: could my self dialation of the Urethra have caused this if it was performed 24 hours prior to the appointment? Usually, I perform the self dialation days before the appointment! Thanks!  David

## 2022-03-30 ENCOUNTER — APPOINTMENT (OUTPATIENT)
Dept: PHYSICAL THERAPY | Facility: HOSPITAL | Age: 62
End: 2022-03-30
Attending: FAMILY MEDICINE
Payer: MEDICARE

## 2022-04-04 ENCOUNTER — TELEPHONE (OUTPATIENT)
Dept: FAMILY MEDICINE CLINIC | Facility: CLINIC | Age: 62
End: 2022-04-04

## 2022-04-04 NOTE — TELEPHONE ENCOUNTER
Pt requesting his full complete blood work for upcoming BP appt with Dr Héctor Bolden on 4/18/22.  Please notify pt once done

## 2022-04-04 NOTE — TELEPHONE ENCOUNTER
1. Prediabetes (Primary)  Overview:  A1c 5.8 in 2021  Orders:  -     Hemoglobin A1C; Future; Expected date: 04/04/2022  2. Elevated PSA, less than 10 ng/ml  Overview:  PSA 4.8 8/2017 with BPH sx. Down to 1.6 2/2018 without tx. Dr Elena Mahajan  3. Vitamin D deficiency  -     Vitamin D; Future; Expected date: 04/04/2022  4. Mixed hyperlipidemia  Overview:  LDL  with TLC  Orders:  -     TSH W Reflex To Free T4; Future; Expected date: 04/04/2022  -     Lipid Panel; Future; Expected date: 04/04/2022  -     Comp Metabolic Panel (14); Future; Expected date: 04/04/2022  5. Primary osteoarthritis of both knees  Overview:  Progressively worse pain, prevented him from going up and down ladders and led to his early FPC  Orders:  -     CBC With Differential With Platelet; Future; Expected date: 04/04/2022  6. Adult BMI 34.0-34.9 kg/sq m  -     CBC With Differential With Platelet; Future; Expected date: 04/04/2022       OK to notify.  Thanks, Rahat Olivarez MD

## 2022-04-06 ENCOUNTER — OFFICE VISIT (OUTPATIENT)
Dept: PHYSICAL THERAPY | Facility: HOSPITAL | Age: 62
End: 2022-04-06
Attending: FAMILY MEDICINE
Payer: MEDICARE

## 2022-04-06 ENCOUNTER — TELEPHONE (OUTPATIENT)
Dept: PHYSICAL THERAPY | Facility: HOSPITAL | Age: 62
End: 2022-04-06

## 2022-04-06 PROCEDURE — 97140 MANUAL THERAPY 1/> REGIONS: CPT

## 2022-04-06 PROCEDURE — 97110 THERAPEUTIC EXERCISES: CPT

## 2022-04-06 NOTE — PROGRESS NOTES
Dx: SI pain, Low back pain. Insurance (Authorized # of Visits):  10 initially. .. Authorizing Physician: Dr. Debar Witt MD visit:  4/18/22. .. Fall Risk: standard         Precautions: n/a              Subjective: L low back pain. Feels like back is very tight and it needs deep massage. Pain: 3/10, pain increases with activity to 10/10-increases pain with transferring floor>standing when doing remodeling. 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 Objective:    3/9/22  Sit to standing with UE assist and some pain. Gait is at a normal pace with normal stride lengths. TL ROM while standing:  Flexion 85 with low back pain. Extension 15 with low back pain. 6\" ASU with improved power and control. Assessment:   Pt able to correctly perform abdominal bracing and incorporated with transferring and lifting. Upgraded HEP. Pt reports L LBP almost resolved, but overall pain is persistent. Appears that remodeling positioning aggravating symptoms. Goals:  (to be met in 10 visits)   Sit to/from standing without UE assist and no pain. Progress. Pt will report sleeping at least 6 hours undisturbed by pain; and report less pain when getting out of bed each morning. Updated. Pt will demonstrate neutral spine posture and report no pain when bending forward with his ADLs. Progress. Pt will be independent with an upgraded HEP. Ongoing; being met. Plan: Continue skilled Physical Therapy 1-2 x/week or a total of 8 additional visits over a 90 day period. Treatment will continue to include:  manual therapy prn, education, LE/core strengthening. Date: 1/13/22  Tx#: 6/10 1/19/22  #7/10 1/21/22  #8/10 1/26/22  #9/10 1/28/22  #10/10 2/16/22  #1/8 add'l... 2/23/22  #2/8 add'l rxs. .. 3/2/22  #3 3/9/22  #4 add'l. .. 4/6/2022   #5     Load 6 for 6 minutes. NuStep (10/11) load 6 for 6 minutes. Load 6 for 6 minutes. NuStep load 6 for 6 minutes. Load 6 for 6 minutes.   Load 6 for 6 minutes. NuStep load 6 for 6 minutes. NuStep load 6 for 6 minutes. NuStep load 6 for 6 minutes. abdominal bracing 10 sec x10-HEP    abdominal bracing + march x10-HEP                 X 20 reps. . X 20 reps. . DKTC with SB x 20. X 20 reps. . X 20 reps. . DKTC with SB x 20 reps. . DKTC with SB x 20 reps. . X 20 reps. .  X 20 reps. Odella Neelam x20     X 20 reps. . X 20 l/r. . X 20 l/r. . X 20 l/r. . X 20 l/r. Odella Neelam LTR with calves on SB x 20 l/r. Odella Neelam LTR with calves on SB x 20. X 20 l/r. . X 20 l/r. Odella Neelam x20     X 15. X 20. X 20. X 20  X 20 Bridging with calves on SB x 10 reps. . 2 x 10.  2 x 10.  2 x 10.  2x10     Supine clam shells green band x 20 reps. . With blue band 2 x 10. X 25 reps. . X 25 reps. . HEP. 3 x 8. X. 3 x 10.  3 x 10.  x30     X 15 reps. X 15 reps. .. X 15. X 15 reps. . X 15 reps. Odella Neelam PPT while hook lying with ball squeezing 5 second holds x 15 reps. . X 15 reps. . X 15 reps. . X 15 reps. . Repeated extension x10-to be incorporated after periods of flexed positioning during remodeling     HEP. Supine piriformis stretching by PT X. Supine piriformis stretch by PT left and right for 45 second holds each. .. With SB assist for 30 seconds each leg. By PT for 30 seconds x 2 each leg. X. X. By PT for 1 minute each leg.  x     X 10 each leg. 2 x 10 l/r. Odella Neelam 2 x 10 each leg. X 10 l/r. . - X 10 l/r. . X 10 l/r. Odella Neelam Seated sciatic tensioners x 10 l/r. Odella Neelam Seated sciatic tensioners x 10 l/r. Odella Neelam abdominal bracing with transferring and lifting     X 25. A/P x 20. A/P x 20. A/P x 20. A/P x 25. A/P x 25. X. Wooden balance board a/p x 25. Wooden balance board a/p x 30. Hamstring stretch L/R x30 sec w DF/PF     X 10 each leg. X 10 each leg. 6\" LSU x 10 each leg. X 10 each leg. X 10 reps. . 6\" LSU x 10 l/r. Odella Neelam X 12 each leg. 6\" [facility] x 12 l/r. Odella Neelam 6\" [facility] x 12 l/r. Odella Neelam Reviewed HEP     See above. - - Seated TF stretching with SB use x 10 reps; and as HEP.  HEP.  HEP.  HEP.  - HEP.  ed     While prone:    stm t-l-s area by PT.    TL  by PT. Hip PROM l/r by PT. While prone:   stm t-l-s area by PT. TL  by PT. Hip PROM l/r by PT. Prone:    stm t-l-s area by PT. TL  by PT. Hip PROM l/r by PT. While prone:    stm t-l-s area by PT. TL  by PT. Hip PROM by PT.  - While prone:    stm t-l-s area by PT. TL  by PT. Hip PROM by PT. While prone:    stm t-l-s area by PT. Hip PROM l/r by PT. TL  by PT. While prone:    stm t-l-s area by PT. TL  by PT. Hip PROM l/r by PT. While prone:    stm t-l-s area by PT. Hip PROM l/r by PT. TL  by PT. Prone:  STM/GIASTM L T/L      Hip PROM l/r by PT. T/L T10-L5 jt mobes gr III-IV  multiple bouts      T/L rotational jt mobes-into R rotation  sidelying gr III multiple bouts      HEP: PPT, LTR, supine piriformis stretch, seated sciatic tensioners.  hip    Charges: TEx2, MT       Total Timed Treatment: 43 min  Total Treatment Time: 43 min

## 2022-04-07 NOTE — TELEPHONE ENCOUNTER
Pt dropped off Colonoscopy results and requesting we review and let him know on getting Sleep Study and Hep C.  Results in Dr Js Cutler : Yes

## 2022-04-12 ENCOUNTER — PATIENT MESSAGE (OUTPATIENT)
Dept: FAMILY MEDICINE CLINIC | Facility: CLINIC | Age: 62
End: 2022-04-12

## 2022-04-12 ENCOUNTER — LAB ENCOUNTER (OUTPATIENT)
Dept: LAB | Age: 62
End: 2022-04-12
Attending: FAMILY MEDICINE
Payer: MEDICARE

## 2022-04-12 DIAGNOSIS — E55.9 VITAMIN D DEFICIENCY: ICD-10-CM

## 2022-04-12 DIAGNOSIS — E78.2 MIXED HYPERLIPIDEMIA: ICD-10-CM

## 2022-04-12 DIAGNOSIS — R73.03 PREDIABETES: ICD-10-CM

## 2022-04-12 DIAGNOSIS — M17.0 PRIMARY OSTEOARTHRITIS OF BOTH KNEES: ICD-10-CM

## 2022-04-12 LAB
ALBUMIN SERPL-MCNC: 4 G/DL (ref 3.4–5)
ALBUMIN/GLOB SERPL: 1.4 {RATIO} (ref 1–2)
ALP LIVER SERPL-CCNC: 91 U/L
ALT SERPL-CCNC: 36 U/L
ANION GAP SERPL CALC-SCNC: 6 MMOL/L (ref 0–18)
AST SERPL-CCNC: 20 U/L (ref 15–37)
BASOPHILS # BLD AUTO: 0.08 X10(3) UL (ref 0–0.2)
BASOPHILS NFR BLD AUTO: 1 %
BILIRUB SERPL-MCNC: 0.7 MG/DL (ref 0.1–2)
BUN BLD-MCNC: 18 MG/DL (ref 7–18)
CALCIUM BLD-MCNC: 10.1 MG/DL (ref 8.5–10.1)
CHLORIDE SERPL-SCNC: 105 MMOL/L (ref 98–112)
CHOLEST SERPL-MCNC: 227 MG/DL (ref ?–200)
CO2 SERPL-SCNC: 28 MMOL/L (ref 21–32)
CREAT BLD-MCNC: 0.98 MG/DL
EOSINOPHIL # BLD AUTO: 0.6 X10(3) UL (ref 0–0.7)
EOSINOPHIL NFR BLD AUTO: 7.7 %
ERYTHROCYTE [DISTWIDTH] IN BLOOD BY AUTOMATED COUNT: 13.9 %
EST. AVERAGE GLUCOSE BLD GHB EST-MCNC: 123 MG/DL (ref 68–126)
FASTING PATIENT LIPID ANSWER: YES
FASTING STATUS PATIENT QL REPORTED: YES
GLOBULIN PLAS-MCNC: 2.8 G/DL (ref 2.8–4.4)
GLUCOSE BLD-MCNC: 96 MG/DL (ref 70–99)
HBA1C MFR BLD: 5.9 % (ref ?–5.7)
HCT VFR BLD AUTO: 49.1 %
HDLC SERPL-MCNC: 67 MG/DL (ref 40–59)
HGB BLD-MCNC: 15.7 G/DL
IMM GRANULOCYTES # BLD AUTO: 0.02 X10(3) UL (ref 0–1)
IMM GRANULOCYTES NFR BLD: 0.3 %
LDLC SERPL CALC-MCNC: 146 MG/DL (ref ?–100)
LYMPHOCYTES # BLD AUTO: 3.01 X10(3) UL (ref 1–4)
LYMPHOCYTES NFR BLD AUTO: 38.5 %
MCH RBC QN AUTO: 31.2 PG (ref 26–34)
MCHC RBC AUTO-ENTMCNC: 32 G/DL (ref 31–37)
MCV RBC AUTO: 97.4 FL
MONOCYTES # BLD AUTO: 0.8 X10(3) UL (ref 0.1–1)
MONOCYTES NFR BLD AUTO: 10.2 %
NEUTROPHILS # BLD AUTO: 3.3 X10 (3) UL (ref 1.5–7.7)
NEUTROPHILS # BLD AUTO: 3.3 X10(3) UL (ref 1.5–7.7)
NEUTROPHILS NFR BLD AUTO: 42.3 %
NONHDLC SERPL-MCNC: 160 MG/DL (ref ?–130)
OSMOLALITY SERPL CALC.SUM OF ELEC: 290 MOSM/KG (ref 275–295)
PLATELET # BLD AUTO: 243 10(3)UL (ref 150–450)
POTASSIUM SERPL-SCNC: 4.7 MMOL/L (ref 3.5–5.1)
PROT SERPL-MCNC: 6.8 G/DL (ref 6.4–8.2)
RBC # BLD AUTO: 5.04 X10(6)UL
SODIUM SERPL-SCNC: 139 MMOL/L (ref 136–145)
TRIGL SERPL-MCNC: 78 MG/DL (ref 30–149)
TSI SER-ACNC: 3.66 MIU/ML (ref 0.36–3.74)
VIT D+METAB SERPL-MCNC: 41.5 NG/ML (ref 30–100)
VLDLC SERPL CALC-MCNC: 15 MG/DL (ref 0–30)
WBC # BLD AUTO: 7.8 X10(3) UL (ref 4–11)

## 2022-04-12 PROCEDURE — 80061 LIPID PANEL: CPT

## 2022-04-12 PROCEDURE — 83036 HEMOGLOBIN GLYCOSYLATED A1C: CPT

## 2022-04-12 PROCEDURE — 85025 COMPLETE CBC W/AUTO DIFF WBC: CPT

## 2022-04-12 PROCEDURE — 36415 COLL VENOUS BLD VENIPUNCTURE: CPT

## 2022-04-12 PROCEDURE — 84443 ASSAY THYROID STIM HORMONE: CPT

## 2022-04-12 PROCEDURE — 82306 VITAMIN D 25 HYDROXY: CPT

## 2022-04-12 PROCEDURE — 80053 COMPREHEN METABOLIC PANEL: CPT

## 2022-04-12 NOTE — TELEPHONE ENCOUNTER
From: Elissa Kilpatrick.   To: Rena Tom MD  Sent: 4/12/2022 3:12 PM CDT  Subject: Question regarding CBC W/ DIFFERENTIAL    Are all of my blood work results considered good/normal?

## 2022-04-12 NOTE — TELEPHONE ENCOUNTER
From: Tra Diaz.   To: Adelaida Stewart MD  Sent: 4/12/2022 3:14 PM CDT  Subject: Question regarding VITAMIN D, 25-HYDROXY    Are all of my results considered good/normal?

## 2022-04-12 NOTE — TELEPHONE ENCOUNTER
From: Estefania Sofia.   To: Sherri Gutierrez MD  Sent: 4/12/2022 3:16 PM CDT  Subject: Question regarding TSH W REFLEX TO FREE T4    Are all of my results considered good/normal?

## 2022-04-13 ENCOUNTER — OFFICE VISIT (OUTPATIENT)
Dept: PHYSICAL THERAPY | Facility: HOSPITAL | Age: 62
End: 2022-04-13
Attending: FAMILY MEDICINE
Payer: MEDICARE

## 2022-04-13 PROCEDURE — 97140 MANUAL THERAPY 1/> REGIONS: CPT

## 2022-04-13 PROCEDURE — 97110 THERAPEUTIC EXERCISES: CPT

## 2022-04-13 NOTE — PROGRESS NOTES
Dx: SI pain, Low back pain. Insurance (Authorized # of Visits):  10 initially. .. Authorizing Physician: Dr. Afsaneh Calderon  Next MD visit:  4/18/22. .. Fall Risk: standard         Precautions: n/a              Subjective: L low back pain persists. It is rated up to 7-8/10 at times. Does note feeling more comfortable when sitting. 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 Objective:   See treatment flow sheetl   Sit to standing with UE assist and some pain. Gait is at a normal pace with normal stride lengths. TL ROM while standing:  Flexion 85 with low back pain. Extension 15 with low back pain. 6\" ASU with improved power and control. Assessment: Pt did well with manual therapy and exercises today. Appears that remodeling positioning aggravating symptoms. Goals:  (to be met in 10 visits)   Sit to/from standing without UE assist and no pain. Progress. Pt will report sleeping at least 6 hours undisturbed by pain; and report less pain when getting out of bed each morning. Updated. Pt will demonstrate neutral spine posture and report no pain when bending forward with his ADLs. Progress. Pt will be independent with an upgraded HEP. Ongoing; being met. Plan: Continue skilled Physical Therapy 1-2 x/week or a total of 8 additional visits over a 90 day period. Treatment will continue to include:  manual therapy prn, education, LE/core strengthening. Date: 1/13/22  Tx#: 6/10 1/19/22  #7/10 1/21/22  #8/10 1/26/22  #9/10 1/28/22  #10/10 2/16/22  #1/8 add'l... 2/23/22  #2/8 add'l rxs. .. 3/2/22  #3 3/9/22  #4 add'l. .. 4/6/2022   #5 4/13/22  #6   Load 6 for 6 minutes. NuStep (10/11) load 6 for 6 minutes. Load 6 for 6 minutes. NuStep load 6 for 6 minutes. Load 6 for 6 minutes. Load 6 for 6 minutes. NuStep load 6 for 6 minutes. NuStep load 6 for 6 minutes. NuStep load 6 for 6 minutes.   abdominal bracing 10 sec x10-HEP    abdominal bracing + march x10-HEP             NuStep load 6 for 6 minutes. PPT with alt marching x 20 l/r. .. X 20 reps. . X 20 reps. . DKTC with SB x 20. X 20 reps. . X 20 reps. . DKTC with SB x 20 reps. . DKTC with SB x 20 reps. . X 20 reps. .  X 20 reps. Trula Vini x20 X 20 reps. .   X 20 reps. . X 20 l/r. . X 20 l/r. . X 20 l/r. . X 20 l/r. Trula Vini LTR with calves on SB x 20 l/r. Trula Vini LTR with calves on SB x 20. X 20 l/r. . X 20 l/r. Trula Vini x20 X 20 reps. .   X 15. X 20. X 20. X 20  X 20 Bridging with calves on SB x 10 reps. . 2 x 10.  2 x 10.  2 x 10.  2x10 X 20 reps. .   Supine clam shells green band x 20 reps. . With blue band 2 x 10. X 25 reps. . X 25 reps. . HEP. 3 x 8. X. 3 x 10.  3 x 10.  x30 With grey band x 30 reps. .. X 15 reps. X 15 reps. .. X 15. X 15 reps. . X 15 reps. Trula Vini PPT while hook lying with ball squeezing 5 second holds x 15 reps. . X 15 reps. . X 15 reps. . X 15 reps. . Repeated extension x10-to be incorporated after periods of flexed positioning during remodeling TNE. ..   HEP. Supine piriformis stretching by PT X. Supine piriformis stretch by PT left and right for 45 second holds each. .. With SB assist for 30 seconds each leg. By PT for 30 seconds x 2 each leg. X. X. By PT for 1 minute each leg.  x For 1 minute each leg. X 10 each leg. 2 x 10 l/r. Trula Vini 2 x 10 each leg. X 10 l/r. . - X 10 l/r. . X 10 l/r. Trula Vini Seated sciatic tensioners x 10 l/r. Trula Vini Seated sciatic tensioners x 10 l/r. Trula Vini abdominal bracing with transferring and lifting    X 25. A/P x 20. A/P x 20. A/P x 20. A/P x 25. A/P x 25. X. Wooden balance board a/p x 25. Wooden balance board a/p x 30. Hamstring stretch L/R x30 sec w DF/PF 30 seconds x 2 l/r with ankle pumps. X 10 each leg. X 10 each leg. 6\" LSU x 10 each leg. X 10 each leg. X 10 reps. . 6\" LSU x 10 l/r. Trula Vini X 12 each leg. 6\" LSU x 12 l/r. Trula Vini 6\" LSU x 12 l/r. Trula Vini Reviewed HEP    See above. - - Seated TF stretching with SB use x 10 reps; and as HEP.  HEP.  HEP.  HEP.  - HEP.    ed - While prone:    stm t-l-s area by PT. TL  by PT. Hip PROM l/r by PT. While prone:   stm t-l-s area by PT. TL  by PT. Hip PROM l/r by PT. Prone:    stm t-l-s area by PT. TL  by PT. Hip PROM l/r by PT. While prone:    stm t-l-s area by PT. TL  by PT. Hip PROM by PT.  - While prone:    stm t-l-s area by PT. TL  by PT. Hip PROM by PT. While prone:    stm t-l-s area by PT. Hip PROM l/r by PT. TL  by PT. While prone:    stm t-l-s area by PT. TL  by PT. Hip PROM l/r by PT. While prone:    stm t-l-s area by PT. Hip PROM l/r by PT. TL  by PT. Prone:  STM/GIASTM L T/L      Hip PROM l/r by PT. T/L T10-L5 jt mobes gr III-IV  multiple bouts      T/L rotational jt mobes-into R rotation  sidelying gr III multiple bouts  While prone:    stm TLS area by PT. Hip PROM l/r by PT. Grade III TL  and transverse mobs by PT.    HEP: PPT, LTR, supine piriformis stretch, seated sciatic tensioners. hip    Charges: Daksha Washington.        Total Timed Treatment: 43 min  Total Treatment Time: 43 min

## 2022-04-15 NOTE — ASSESSMENT & PLAN NOTE
As for his Pre-Diabetes, it is well controlled, no significant medication side effects noted. Recommendations are: continue present meds, lose weight by increased dietary compliance and exercise and will check labs as ordered.     Lab Results   Component Value Date    A1C 5.9 (H) 04/12/2022    A1C 5.9 (H) 10/11/2021

## 2022-04-18 ENCOUNTER — OFFICE VISIT (OUTPATIENT)
Dept: FAMILY MEDICINE CLINIC | Facility: CLINIC | Age: 62
End: 2022-04-18
Payer: MEDICARE

## 2022-04-18 VITALS
SYSTOLIC BLOOD PRESSURE: 126 MMHG | RESPIRATION RATE: 18 BRPM | BODY MASS INDEX: 35.52 KG/M2 | HEIGHT: 67.5 IN | DIASTOLIC BLOOD PRESSURE: 74 MMHG | HEART RATE: 76 BPM | WEIGHT: 229 LBS

## 2022-04-18 DIAGNOSIS — I10 ESSENTIAL HYPERTENSION: ICD-10-CM

## 2022-04-18 DIAGNOSIS — E78.2 MIXED HYPERLIPIDEMIA: ICD-10-CM

## 2022-04-18 DIAGNOSIS — K21.9 GASTROESOPHAGEAL REFLUX DISEASE WITHOUT ESOPHAGITIS: ICD-10-CM

## 2022-04-18 DIAGNOSIS — R73.03 PREDIABETES: Primary | ICD-10-CM

## 2022-04-18 PROCEDURE — 99214 OFFICE O/P EST MOD 30 MIN: CPT | Performed by: FAMILY MEDICINE

## 2022-04-18 RX ORDER — FAMOTIDINE 40 MG/1
40 TABLET, FILM COATED ORAL DAILY
Qty: 90 TABLET | Refills: 3 | Status: SHIPPED | OUTPATIENT
Start: 2022-04-18

## 2022-04-20 ENCOUNTER — APPOINTMENT (OUTPATIENT)
Dept: PHYSICAL THERAPY | Facility: HOSPITAL | Age: 62
End: 2022-04-20
Attending: FAMILY MEDICINE
Payer: MEDICARE

## 2022-05-04 ENCOUNTER — OFFICE VISIT (OUTPATIENT)
Dept: PHYSICAL THERAPY | Facility: HOSPITAL | Age: 62
End: 2022-05-04
Attending: FAMILY MEDICINE
Payer: MEDICARE

## 2022-05-04 PROCEDURE — 97140 MANUAL THERAPY 1/> REGIONS: CPT

## 2022-05-04 PROCEDURE — 97110 THERAPEUTIC EXERCISES: CPT

## 2022-05-04 NOTE — PROGRESS NOTES
Dx: SI pain, Low back pain. Insurance (Authorized # of Visits):  10 initially. .. Authorizing Physician: Dr. Katya Witt MD visit:  4/18/22. .. Fall Risk: standard         Precautions: n/a            Subjective: L low back pain persists. It reportedly ranges from 1-5/10; is most comfortable when sitting, and most painful with quicker movements, especially into forward flexion or forward flexion with rotation. Objective:   See treatment flow sheetl   Sit to standing with less UE assist and 1-2/10 pain level. Gait is at a normal pace with normal stride lengths. TL ROM while standing:  Flexion 85 with \"a good stretch\". Extension 15. Assessment: Pt did well with manual therapy and exercises today. Goals:  (to be met in 10 visits)   Sit to/from standing without UE assist and no pain. Progress. Pt will report sleeping at least 6 hours undisturbed by pain; and report less pain when getting out of bed each morning. Updated. Pt will demonstrate neutral spine posture and report no pain when bending forward with his ADLs. Progress. Pt will be independent with an upgraded HEP. Ongoing; being met. Plan: Continue skilled Physical Therapy 1-2 x/week or a total of 8 additional visits over a 90 day period. Treatment will continue to include:  manual therapy prn, education, LE/core strengthening. 1/19/22  #7/10 1/21/22  #8/10 1/26/22  #9/10 1/28/22  #10/10 2/16/22  #1/8 add'l... 2/23/22  #2/8 add'l rxs. .. 3/2/22  #3 3/9/22  #4 add'l. .. 4/6/2022   #5 4/13/22  #6 5/4/22  #7    NuStep (10/11) load 6 for 6 minutes. Load 6 for 6 minutes. NuStep load 6 for 6 minutes. Load 6 for 6 minutes. Load 6 for 6 minutes. NuStep load 6 for 6 minutes. NuStep load 6 for 6 minutes. NuStep load 6 for 6 minutes. abdominal bracing 10 sec x10-HEP    abdominal bracing + march x10-HEP             NuStep load 6 for 6 minutes. PPT with alt marching x 20 l/r. ..      NuStep load 6 for 6 minutes. PPT with alt marching 2 x 10 l/r. .    X 20 reps. . DKTC with SB x 20. X 20 reps. . X 20 reps. . DKTC with SB x 20 reps. . DKTC with SB x 20 reps. . X 20 reps. .  X 20 reps. Daun Comber x20 X 20 reps. . X 20 reps. .    X 20 l/r. . X 20 l/r. . X 20 l/r. . X 20 l/r. Daun Comber LTR with calves on SB x 20 l/r. Daun Comber LTR with calves on SB x 20. X 20 l/r. . X 20 l/r. Daun Comber x20 X 20 reps. . X 20 reps. .    X 20. X 20. X 20  X 20 Bridging with calves on SB x 10 reps. . 2 x 10.  2 x 10.  2 x 10.  2x10 X 20 reps. . X 20 reps. .    With blue band 2 x 10. X 25 reps. . X 25 reps. . HEP. 3 x 8. X. 3 x 10.  3 x 10.  x30 With grey band x 30 reps. .. X 30 reps. Daun Comber jj    X 15 reps. .. X 15. X 15 reps. . X 15 reps. Daun Comber PPT while hook lying with ball squeezing 5 second holds x 15 reps. . X 15 reps. . X 15 reps. . X 15 reps. . Repeated extension x10-to be incorporated after periods of flexed positioning during remodeling TNE. .. -    Supine piriformis stretching by PT X. Supine piriformis stretch by PT left and right for 45 second holds each. .. With SB assist for 30 seconds each leg. By PT for 30 seconds x 2 each leg. X. X. By PT for 1 minute each leg.  x For 1 minute each leg. For 1 minute each leg. 2 x 10 l/r. Daun Comber 2 x 10 each leg. X 10 l/r. . - X 10 l/r. . X 10 l/r. Daun Comber Seated sciatic tensioners x 10 l/r. Daun Comber Seated sciatic tensioners x 10 l/r. Daun Comber abdominal bracing with transferring and lifting  Seated trunk flexion with SB assist x 10 reps. .. A/P x 20. A/P x 20. A/P x 20. A/P x 25. A/P x 25. X. Wooden balance board a/p x 25. Wooden balance board a/p x 30. Hamstring stretch L/R x30 sec w DF/PF 30 seconds x 2 l/r with ankle pumps. X 15 l/r. .    X 10 each leg. 6\" LSU x 10 each leg. X 10 each leg. X 10 reps. . 6\" LSU x 10 l/r. Daun Comber X 12 each leg. 6\" LSU x 12 l/r. Daun Comber 6\" LSU x 12 l/r. Daun Comber Reviewed HEP  Right side lying lumbar rotation mobs by PT for 2-3 minutes. - - Seated TF stretching with SB use x 10 reps; and as HEP.  HEP.  HEP.  HEP.  - HEP.    ed - -    While prone:   stm t-l-s area by PT. TL  by PT. Hip PROM l/r by PT. Prone:    stm t-l-s area by PT. TL  by PT. Hip PROM l/r by PT. While prone:    stm t-l-s area by PT. TL  by PT. Hip PROM by PT.  - While prone:    stm t-l-s area by PT. TL  by PT. Hip PROM by PT. While prone:    stm t-l-s area by PT. Hip PROM l/r by PT. TL  by PT. While prone:    stm t-l-s area by PT. TL  by PT. Hip PROM l/r by PT. While prone:    stm t-l-s area by PT. Hip PROM l/r by PT. TL  by PT. Prone:  STM/GIASTM L T/L      Hip PROM l/r by PT. T/L T10-L5 jt mobes gr III-IV  multiple bouts      T/L rotational jt mobes-into R rotation  sidelying gr III multiple bouts  While prone:    stm TLS area by PT. Hip PROM l/r by PT. Grade III TL  and transverse mobs by PT. While prone:    stm TLS area by PT. Hip PROM l/r by PT. Grade III TL  and transverse mobs by PT.     HEP: PPT, LTR, supine piriformis stretch, seated sciatic tensioners. hip    Charges: Jeff Fossa.        Total Timed Treatment: 43 min  Total Treatment Time: 43 min

## 2022-05-11 ENCOUNTER — OFFICE VISIT (OUTPATIENT)
Dept: PHYSICAL THERAPY | Facility: HOSPITAL | Age: 62
End: 2022-05-11
Attending: FAMILY MEDICINE
Payer: MEDICARE

## 2022-05-11 PROCEDURE — 97110 THERAPEUTIC EXERCISES: CPT

## 2022-05-11 PROCEDURE — 97140 MANUAL THERAPY 1/> REGIONS: CPT

## 2022-05-11 NOTE — PROGRESS NOTES
Dx: SI pain, Low back pain. Insurance (Authorized # of Visits):  10 initially. .. Authorizing Physician: Dr. Divya Witt MD visit:  4/18/22. .. Fall Risk: standard         Precautions: n/a            Subjective: L low back pain persists. It reportedly ranges from 0-5/10; is most comfortable when sitting, and most painful with quicker movements, especially into forward flexion or forward flexion with rotation to the left. Standing tolerance is 40 minutes. Walking tolerance is 4 blocks. Objective:   See treatment flow sheetl   Sit to standing with less UE assist and 1-2/10 pain level. Gait is at a normal pace with normal stride lengths. TL ROM while standing:  Flexion 85 with \"a good stretch\". Extension 15. Assessment: Pt did well with manual therapy and exercises today. Goals:  (to be met in 10 visits)   Sit to/from standing without UE assist and no pain. Progress. Pt will report sleeping at least 6 hours undisturbed by pain; and report less pain when getting out of bed each morning. Updated. Pt will demonstrate neutral spine posture and report no pain when bending forward with his ADLs. Progress. Pt will be independent with an upgraded HEP. Ongoing; being met. Plan: Continue skilled Physical Therapy 1-2 x/week or a total of 10 additional visits over a 90 day period. Treatment will continue to include:  manual therapy prn, education, LE/core strengthening. 1/19/22  #7/10 1/21/22  #8/10 1/26/22  #9/10 1/28/22  #10/10 2/16/22  #1/8 add'l... 2/23/22  #2/8 add'l rxs. .. 3/2/22  #3 3/9/22  #4 add'l. .. 4/6/2022   #5 4/13/22  #6 5/4/22  #7 5/11/22  #8/10. .. NuStep (10/11) load 6 for 6 minutes. Load 6 for 6 minutes. NuStep load 6 for 6 minutes. Load 6 for 6 minutes. Load 6 for 6 minutes. NuStep load 6 for 6 minutes. NuStep load 6 for 6 minutes. NuStep load 6 for 6 minutes.   abdominal bracing 10 sec x10-HEP    abdominal bracing + march x10-HEP             NuStep load 6 for 6 minutes. PPT with alt marching x 20 l/r. .. NuStep load 6 for 6 minutes. PPT with alt marching 2 x 10 l/r. . NuStep load 6 for 6 minutes. PPT with alt marching 2 x 10. X 20 reps. . DKTC with SB x 20. X 20 reps. . X 20 reps. . DKTC with SB x 20 reps. . DKTC with SB x 20 reps. . X 20 reps. .  X 20 reps. Dawna Sprinkles x20 X 20 reps. . X 20 reps. . X 20 reps. .     X 20 l/r. . X 20 l/r. . X 20 l/r. . X 20 l/r. Dawna Sprinkles LTR with calves on SB x 20 l/r. Dawna Sprinkles LTR with calves on SB x 20. X 20 l/r. . X 20 l/r. Dawna Sprinkles x20 X 20 reps. . X 20 reps. . X 20 reps. .     X 20. X 20. X 20  X 20 Bridging with calves on SB x 10 reps. . 2 x 10.  2 x 10.  2 x 10.  2x10 X 20 reps. . X 20 reps. . X 20 reps. .     With blue band 2 x 10. X 25 reps. . X 25 reps. . HEP. 3 x 8. X. 3 x 10.  3 x 10.  x30 With grey band x 30 reps. .. X 30 reps. Dawna Sprinkles jj X 30 reps. .     X 15 reps. .. X 15. X 15 reps. . X 15 reps. Dawna Sprinkles PPT while hook lying with ball squeezing 5 second holds x 15 reps. . X 15 reps. . X 15 reps. . X 15 reps. . Repeated extension x10-to be incorporated after periods of flexed positioning during remodeling TNE. .. -      Supine piriformis stretching by PT X. Supine piriformis stretch by PT left and right for 45 second holds each. .. With SB assist for 30 seconds each leg. By PT for 30 seconds x 2 each leg. X. X. By PT for 1 minute each leg.  x For 1 minute each leg. For 1 minute each leg. X.     2 x 10 l/r. Dawna Sprinkles 2 x 10 each leg. X 10 l/r. . - X 10 l/r. . X 10 l/r. Dawna Sprinkles Seated sciatic tensioners x 10 l/r. Dawna Sprinkles Seated sciatic tensioners x 10 l/r. Dawna Sprinkles abdominal bracing with transferring and lifting  Seated trunk flexion with SB assist x 10 reps. .. HEP. A/P x 20. A/P x 20. A/P x 20. A/P x 25. A/P x 25. X. Wooden balance board a/p x 25. Wooden balance board a/p x 30. Hamstring stretch L/R x30 sec w DF/PF 30 seconds x 2 l/r with ankle pumps. X 15 l/r. Dawna Sprinkles X 15 l/r. .     X 10 each leg. 6\" LSU x 10 each leg. X 10 each leg. X 10 reps. . 6\" LSU x 10 l/r.. X 12 each leg. 6\" LSU x 12 l/r. Dawna Sprinkles 6\" LSU x 12 l/r. Dawna Sprinkles Reviewed HEP  Right side lying lumbar rotation mobs by PT for 2-3 minutes. Right side lying:   Lumbar rotation mobs by PT for 2-3 minutes. - - Seated TF stretching with SB use x 10 reps; and as HEP.  HEP.  HEP.  HEP.  - HEP.  ed - - Body mechanics education by PT. While prone:   stm t-l-s area by PT. TL  by PT. Hip PROM l/r by PT. Prone:    stm t-l-s area by PT. TL  by PT. Hip PROM l/r by PT. While prone:    stm t-l-s area by PT. TL  by PT. Hip PROM by PT.  - While prone:    stm t-l-s area by PT. TL  by PT. Hip PROM by PT. While prone:    stm t-l-s area by PT. Hip PROM l/r by PT. TL  by PT. While prone:    stm t-l-s area by PT. TL  by PT. Hip PROM l/r by PT. While prone:    stm t-l-s area by PT. Hip PROM l/r by PT. TL  by PT. Prone:  STM/GIASTM L T/L      Hip PROM l/r by PT. T/L T10-L5 jt mobes gr III-IV  multiple bouts      T/L rotational jt mobes-into R rotation  sidelying gr III multiple bouts  While prone:    stm TLS area by PT. Hip PROM l/r by PT. Grade III TL  and transverse mobs by PT. While prone:    stm TLS area by PT. Hip PROM l/r by PT. Grade III TL  and transverse mobs by PT. While prone:    stm TLS area by PT. Hip PROM l/r by PT. Grade III TL  and transverse mobs by PT.      HEP: PPT, LTR, supine piriformis stretch, seated sciatic tensioners. hip    Charges: July Ayala.        Total Timed Treatment: 44 min  Total Treatment Time: 44 min

## 2022-05-18 ENCOUNTER — OFFICE VISIT (OUTPATIENT)
Dept: PHYSICAL THERAPY | Facility: HOSPITAL | Age: 62
End: 2022-05-18
Attending: FAMILY MEDICINE
Payer: MEDICARE

## 2022-05-18 PROCEDURE — 97110 THERAPEUTIC EXERCISES: CPT

## 2022-05-18 PROCEDURE — 97140 MANUAL THERAPY 1/> REGIONS: CPT

## 2022-05-18 NOTE — PROGRESS NOTES
Dx: SI pain, Low back pain. Insurance (Authorized # of Visits):  10 initially. .. Authorizing Physician: Dr. Breanna Witt MD visit:  4/18/22. .. Fall Risk: standard         Precautions: n/a            Subjective: L low back pain persists. It reportedly ranges from 1-7/10; is most comfortable when sitting, and most painful with quicker movements, especially into forward flexion or forward flexion with rotation to the left. Notes right knee pain that ranges from 1-4/10. Reports that he was extremely active yesterday for 4 and 1/2 hours, including the use of a ladder, and up/down 26 stairs 20+ times. Took 600mg afterwards. Standing tolerance is 40 minutes. Walking tolerance is 4 blocks. Objective:   See treatment flow sheetl   Sit to standing with less UE assist but some pain. Gait is at a normal pace with normal stride lengths. TL ROM while standing:  Flexion 85 with \"a good stretch\". Extension 15. Assessment: Pt did well with manual therapy and exercises today. Goals:  (to be met in 10 visits)   Sit to/from standing without UE assist and no pain. Progress. Pt will report sleeping at least 6 hours undisturbed by pain; and report less pain when getting out of bed each morning. Updated. Pt will demonstrate neutral spine posture and report no pain when bending forward with his ADLs. Progress. Pt will be independent with an upgraded HEP. Ongoing; being met. Plan: Will re-assess at next visit. Continue skilled Physical Therapy 1-2 x/week or a total of 10 additional visits over a 90 day period. Treatment will continue to include:  manual therapy prn, education, LE/core strengthening. 1/19/22  #7/10 1/21/22  #8/10 1/26/22  #9/10 1/28/22  #10/10 2/16/22  #1/8 add'l... 2/23/22  #2/8 add'l rxs. .. 3/2/22  #3 3/9/22  #4 add'l. .. 4/6/2022   #5 4/13/22  #6 5/4/22  #7 5/11/22  #8/10. .. 5/18/22  #9/10    NuStep (10/11) load 6 for 6 minutes.   Load 6 for 6 minutes. NuStep load 6 for 6 minutes. Load 6 for 6 minutes. Load 6 for 6 minutes. NuStep load 6 for 6 minutes. NuStep load 6 for 6 minutes. NuStep load 6 for 6 minutes. abdominal bracing 10 sec x10-HEP    abdominal bracing + march x10-HEP             NuStep load 6 for 6 minutes. PPT with alt marching x 20 l/r. .. NuStep load 6 for 6 minutes. PPT with alt marching 2 x 10 l/r. . NuStep load 6 for 6 minutes. PPT with alt marching 2 x 10. NuStep load 6 for 6 minutes. PPT while hook lying with ball squeezing x 10 reps. .    X 20 reps. . DKTC with SB x 20. X 20 reps. . X 20 reps. . DKTC with SB x 20 reps. . DKTC with SB x 20 reps. . X 20 reps. .  X 20 reps. Neena Carmona x20 X 20 reps. . X 20 reps. . X 20 reps. . DKTC with SB x 20 reps. .      X 20 l/r. . X 20 l/r. . X 20 l/r. . X 20 l/r. Neena Carmona LTR with calves on SB x 20 l/r. Neena Carmona LTR with calves on SB x 20. X 20 l/r. . X 20 l/r. Neena Carmona x20 X 20 reps. . X 20 reps. . X 20 reps. Neena Carmona LTR with calves on SB x 20 l/r. .    X 20. X 20. X 20  X 20 Bridging with calves on SB x 10 reps. . 2 x 10.  2 x 10.  2 x 10.  2x10 X 20 reps. . X 20 reps. . X 20 reps. . Bridging with calves on SB x 20 reps. .    With blue band 2 x 10. X 25 reps. . X 25 reps. . HEP. 3 x 8. X. 3 x 10.  3 x 10.  x30 With grey band x 30 reps. .. X 30 reps. Neena Carmona jj X 30 reps. . X supine clam shells x 30 reps with gray band around knees. X 15 reps. .. X 15. X 15 reps. . X 15 reps. Neena Carmona PPT while hook lying with ball squeezing 5 second holds x 15 reps. . X 15 reps. . X 15 reps. . X 15 reps. . Repeated extension x10-to be incorporated after periods of flexed positioning during remodeling TNE. .. -  -    Supine piriformis stretching by PT X. Supine piriformis stretch by PT left and right for 45 second holds each. .. With SB assist for 30 seconds each leg. By PT for 30 seconds x 2 each leg. X. X. By PT for 1 minute each leg.  x For 1 minute each leg. For 1 minute each leg. X. Piriformis stretching by PT with SB assist for 1 minute each leg.      2 x 10 l/r.. 2 x 10 each leg. X 10 l/r. . - X 10 l/r. . X 10 l/r. Lidia Puff Seated sciatic tensioners x 10 l/r. Lidia Puff Seated sciatic tensioners x 10 l/r. Lidia Puff abdominal bracing with transferring and lifting  Seated trunk flexion with SB assist x 10 reps. .. HEP.  HEP. A/P x 20. A/P x 20. A/P x 20. A/P x 25. A/P x 25. X. Wooden balance board a/p x 25. Wooden balance board a/p x 30. Hamstring stretch L/R x30 sec w DF/PF 30 seconds x 2 l/r with ankle pumps. X 15 l/r. Lidia Puff X 15 l/r. . X 10 l/r. .    X 10 each leg. 6\" LSU x 10 each leg. X 10 each leg. X 10 reps. . 6\" LSU x 10 l/r. Lidia Puff X 12 each leg. 6\" LSU x 12 l/r. Lidia Puff 6\" LSU x 12 l/r. Lidia Puff Reviewed HEP  Right side lying lumbar rotation mobs by PT for 2-3 minutes. Right side lying:   Lumbar rotation mobs by PT for 2-3 minutes. -    - - Seated TF stretching with SB use x 10 reps; and as HEP.  HEP.  HEP.  HEP.  - HEP.  ed - - Body mechanics education by PT. X.    While prone:   stm t-l-s area by PT. TL  by PT. Hip PROM l/r by PT. Prone:    stm t-l-s area by PT. TL  by PT. Hip PROM l/r by PT. While prone:    stm t-l-s area by PT. TL  by PT. Hip PROM by PT.  - While prone:    stm t-l-s area by PT. TL  by PT. Hip PROM by PT. While prone:    stm t-l-s area by PT. Hip PROM l/r by PT. TL  by PT. While prone:    stm t-l-s area by PT. TL  by PT. Hip PROM l/r by PT. While prone:    stm t-l-s area by PT. Hip PROM l/r by PT. TL  by PT. Prone:  STM/GIASTM L T/L      Hip PROM l/r by PT. T/L T10-L5 jt mobes gr III-IV  multiple bouts      T/L rotational jt mobes-into R rotation  sidelying gr III multiple bouts  While prone:    stm TLS area by PT. Hip PROM l/r by PT. Grade III TL  and transverse mobs by PT. While prone:    stm TLS area by PT. Hip PROM l/r by PT. Grade III TL  and transverse mobs by PT. While prone:    stm TLS area by PT. Hip PROM l/r by PT. Grade III TL  and transverse mobs by PT.   While prone:    stm t-l-s area by PT. Hip PROM l/r by PT. Grade III TL  and transverse mobs by PT.     HEP: PPT, LTR, supine piriformis stretch, seated sciatic tensioners. hip    Charges: Alisha Louis.        Total Timed Treatment: 44 min  Total Treatment Time: 44 min

## 2022-05-25 ENCOUNTER — OFFICE VISIT (OUTPATIENT)
Dept: PHYSICAL THERAPY | Facility: HOSPITAL | Age: 62
End: 2022-05-25
Attending: FAMILY MEDICINE
Payer: MEDICARE

## 2022-05-25 PROCEDURE — 97110 THERAPEUTIC EXERCISES: CPT

## 2022-05-25 NOTE — PROGRESS NOTES
Dx: SI pain, Low back pain. Insurance (Authorized # of Visits):  10 initially. .. Authorizing Physician: Dr. Rocio Ba  Next MD visit:  4/18/22. .. Fall Risk: standard         Precautions: n/a           Discharge Summary  Pt has attended 20 visits in Physical Therapy. Subjective: L low back pain persists intermittently. Notes a 30 year hx of low back pain, reportedly dating back to the physical demands of his job. Notes feeling 75% back to how he felt last summer. Rajat Bunch left l-s pain reportedly has ranged from 1-4/10 over the past few days; is most comfortable when sitting, and most painful with quicker movements, especially into forward flexion or forward flexion with rotation. Notes right knee pain that is rated 2/10, and was worse after recently walking up/down 20 long flights of stairs at his car condo. Reports that he still has times when he is extremely active with the finishing of his car condo spaces. Reports taking 600mg of Ibuprofen an average of every other day. Standing tolerance is 30 minutes. Walking tolerance is 4 blocks. Objective:   Sit to standing with less UE assist but some l-s pain. Gait is at a normal pace with normal stride lengths. TL ROM while standing:  Flexion 85 with \"a good stretch\". Extension 15 with some low back tension. Supine hip PROM:  Flexion is 120 B. IR is 10 B. ER R 45. L 40 degrees. LE myotomal strength is 5/5 left = right.   -Slump test R or L. Supine SLR:  R 55. L 60.    8\" ASU x 5 each leg with left l-s pain noted. SL standing on the floor:  R 5 seconds with knee/ankle pain noted. L 8 seconds. Assessment/Plan: Pt is marginally better overall. Is more mobile and symptoms have lessened somewhat. Recommend discharge from PT with a HEP and good knowledge about self care for his condition. Goals:  (to be met by discharge)   Sit to/from standing without UE assist and no pain. Partially met.    Pt will report sleeping at least 6 hours undisturbed by pain; and report less pain when getting out of bed each morning. Partially met. Pt will demonstrate neutral spine posture and report no pain when bending forward with his ADLs. Met. Pt will be independent with an upgraded HEP. Met.        1/19/22  #7/10 1/21/22  #8/10 1/26/22  #9/10 1/28/22  #10/10 2/16/22  #1/8 add'l... 2/23/22  #2/8 add'l rxs. .. 3/2/22  #3 3/9/22  #4 add'l. .. 4/6/2022   #5 4/13/22  #6 5/4/22  #7 5/11/22  #8/10. .. 5/18/22  #9/10 5/25/22  #10/10   NuStep (10/11) load 6 for 6 minutes. Load 6 for 6 minutes. NuStep load 6 for 6 minutes. Load 6 for 6 minutes. Load 6 for 6 minutes. NuStep load 6 for 6 minutes. NuStep load 6 for 6 minutes. NuStep load 6 for 6 minutes. abdominal bracing 10 sec x10-HEP    abdominal bracing + march x10-HEP             NuStep load 6 for 6 minutes. PPT with alt marching x 20 l/r. .. NuStep load 6 for 6 minutes. PPT with alt marching 2 x 10 l/r. . NuStep load 6 for 6 minutes. PPT with alt marching 2 x 10. NuStep load 6 for 6 minutes. PPT while hook lying with ball squeezing x 10 reps. . NuStep load 6 for 6 minutes. SKTC right and left. X 20 reps. . DKTC with SB x 20. X 20 reps. . X 20 reps. . DKTC with SB x 20 reps. . DKTC with SB x 20 reps. . X 20 reps. .  X 20 reps. Jamari Daly x20 X 20 reps. . X 20 reps. . X 20 reps. . DKTC with SB x 20 reps. Jamari Daly HEP was issued and questions were answered. X 20 l/r. . X 20 l/r. . X 20 l/r. . X 20 l/r. Jamari Daly LTR with calves on SB x 20 l/r. Jamari Daly LTR with calves on SB x 20. X 20 l/r. . X 20 l/r. Jamari Daly x20 X 20 reps. . X 20 reps. . X 20 reps. Jamari Daly LTR with calves on SB x 20 l/r. .    X 20. X 20. X 20  X 20 Bridging with calves on SB x 10 reps. . 2 x 10.  2 x 10.  2 x 10.  2x10 X 20 reps. . X 20 reps. . X 20 reps. . Bridging with calves on SB x 20 reps. .    With blue band 2 x 10. X 25 reps. . X 25 reps. . HEP. 3 x 8. X. 3 x 10.  3 x 10.  x30 With grey band x 30 reps. .. X 30 reps. Jamari Daly jj X 30 reps. Pierre Giordano supine clam shells x 30 reps with gray band around knees. X 15 reps. .. X 15. X 15 reps. . X 15 reps. Salvador Anis PPT while hook lying with ball squeezing 5 second holds x 15 reps. . X 15 reps. . X 15 reps. . X 15 reps. . Repeated extension x10-to be incorporated after periods of flexed positioning during remodeling TNE. .. -  -    Supine piriformis stretching by PT X. Supine piriformis stretch by PT left and right for 45 second holds each. .. With SB assist for 30 seconds each leg. By PT for 30 seconds x 2 each leg. X. X. By PT for 1 minute each leg.  x For 1 minute each leg. For 1 minute each leg. X. Piriformis stretching by PT with SB assist for 1 minute each leg. 2 x 10 l/r. Salvador Anis 2 x 10 each leg. X 10 l/r. . - X 10 l/r. . X 10 l/r. Salvador Anis Seated sciatic tensioners x 10 l/r. Salvador Anis Seated sciatic tensioners x 10 l/r. Salvador Anis abdominal bracing with transferring and lifting  Seated trunk flexion with SB assist x 10 reps. .. HEP.  HEP. A/P x 20. A/P x 20. A/P x 20. A/P x 25. A/P x 25. X. Wooden balance board a/p x 25. Wooden balance board a/p x 30. Hamstring stretch L/R x30 sec w DF/PF 30 seconds x 2 l/r with ankle pumps. X 15 l/r. Salvador Anis X 15 l/r. . X 10 l/r. .    X 10 each leg. 6\" LSU x 10 each leg. X 10 each leg. X 10 reps. . 6\" LSU x 10 l/r. Salvador Anis X 12 each leg. 6\" LSU x 12 l/r. Salvador Anis 6\" LSU x 12 l/r. Salvador Anis Reviewed HEP  Right side lying lumbar rotation mobs by PT for 2-3 minutes. Right side lying:   Lumbar rotation mobs by PT for 2-3 minutes. -    - - Seated TF stretching with SB use x 10 reps; and as HEP.  HEP.  HEP.  HEP.  - HEP.  ed - - Body mechanics education by PTInocencio KHANNA was given during today's session. While prone:   stm t-l-s area by PT. TL  by PT. Hip PROM l/r by PT. Prone:    stm t-l-s area by PT. TL  by PT. Hip PROM l/r by PT. While prone:    stm t-l-s area by PT. TL  by PT. Hip PROM by PT.  - While prone:    stm t-l-s area by PT. TL  by PT. Hip PROM by PT.   While prone:    stm t-l-s area by PT. Hip PROM l/r by PT. TL  by PT. While prone:    stm t-l-s area by PT. TL  by PT. Hip PROM l/r by PT. While prone:    stm t-l-s area by PT. Hip PROM l/r by PT. TL  by PT. Prone:  STM/GIASTM L T/L      Hip PROM l/r by PT. T/L T10-L5 jt mobes gr III-IV  multiple bouts      T/L rotational jt mobes-into R rotation  sidelying gr III multiple bouts  While prone:    stm TLS area by PT. Hip PROM l/r by PT. Grade III TL  and transverse mobs by PT. While prone:    stm TLS area by PT. Hip PROM l/r by PT. Grade III TL  and transverse mobs by PT. While prone:    stm TLS area by PT. Hip PROM l/r by PT. Grade III TL  and transverse mobs by PT. While prone:    stm t-l-s area by PT. Hip PROM l/r by PT. Grade III TL  and transverse mobs by PT. Supine hip PROM by PT l/r.. HEP: PPT, LTR, supine piriformis stretch, seated sciatic tensioners. hip    Charges: TherEx3.        Total Timed Treatment: 46 min  Total Treatment Time: 46 min

## 2022-06-01 ENCOUNTER — APPOINTMENT (OUTPATIENT)
Dept: PHYSICAL THERAPY | Facility: HOSPITAL | Age: 62
End: 2022-06-01
Attending: FAMILY MEDICINE
Payer: MEDICARE

## 2022-07-13 NOTE — TELEPHONE ENCOUNTER
RN called patient in response to MD's request to set up an appointment. Spoke to patient and is agreeable to 6/25 Thursday, 11:30am. Also informed patient to bring the meatal dilator. Patient agreed and verbalized understanding.  No questions at this ti wheelchair

## 2022-09-15 ENCOUNTER — PATIENT MESSAGE (OUTPATIENT)
Dept: SURGERY | Facility: CLINIC | Age: 62
End: 2022-09-15

## 2022-09-15 ENCOUNTER — PROCEDURE (OUTPATIENT)
Dept: SURGERY | Facility: CLINIC | Age: 62
End: 2022-09-15
Payer: MEDICARE

## 2022-09-15 DIAGNOSIS — Q64.33 CONGENITAL MEATAL STENOSIS: ICD-10-CM

## 2022-09-15 DIAGNOSIS — N35.911 STRICTURE OF URETHRAL MEATUS IN MALE, UNSPECIFIED STRICTURE TYPE: Primary | ICD-10-CM

## 2022-09-15 LAB
APPEARANCE: CLEAR
BILIRUBIN: NEGATIVE
GLUCOSE (URINE DIPSTICK): NEGATIVE MG/DL
KETONES (URINE DIPSTICK): NEGATIVE MG/DL
MULTISTIX LOT#: ABNORMAL NUMERIC
NITRITE, URINE: NEGATIVE
OCCULT BLOOD: NEGATIVE
PH, URINE: 5 (ref 4.5–8)
PROTEIN (URINE DIPSTICK): NEGATIVE MG/DL
SPECIFIC GRAVITY: >=1.03 (ref 1–1.03)
URINE-COLOR: YELLOW
UROBILINOGEN,SEMI-QN: 0.2 MG/DL (ref 0–1.9)

## 2022-09-15 PROCEDURE — 99213 OFFICE O/P EST LOW 20 MIN: CPT | Performed by: UROLOGY

## 2022-09-15 PROCEDURE — 81003 URINALYSIS AUTO W/O SCOPE: CPT | Performed by: UROLOGY

## 2022-09-30 ENCOUNTER — TELEPHONE (OUTPATIENT)
Dept: FAMILY MEDICINE CLINIC | Facility: CLINIC | Age: 62
End: 2022-09-30

## 2022-09-30 DIAGNOSIS — Z01.30 ENCOUNTER FOR EXAMINATION OF BLOOD PRESSURE WITHOUT ABNORMAL FINDINGS: ICD-10-CM

## 2022-09-30 DIAGNOSIS — M17.0 PRIMARY OSTEOARTHRITIS OF BOTH KNEES: ICD-10-CM

## 2022-09-30 DIAGNOSIS — Z00.00 LABORATORY EXAMINATION ORDERED AS PART OF A ROUTINE GENERAL MEDICAL EXAMINATION: ICD-10-CM

## 2022-09-30 DIAGNOSIS — E55.9 VITAMIN D DEFICIENCY: ICD-10-CM

## 2022-09-30 DIAGNOSIS — R73.9 HYPERGLYCEMIA: ICD-10-CM

## 2022-09-30 DIAGNOSIS — Z12.5 SCREENING FOR PROSTATE CANCER: ICD-10-CM

## 2022-09-30 DIAGNOSIS — R97.20 ELEVATED PSA, LESS THAN 10 NG/ML: ICD-10-CM

## 2022-09-30 DIAGNOSIS — E78.2 MIXED HYPERLIPIDEMIA: ICD-10-CM

## 2022-09-30 DIAGNOSIS — R73.03 PREDIABETES: Primary | ICD-10-CM

## 2022-09-30 NOTE — TELEPHONE ENCOUNTER
Patient is scheduled for his medicare annual 10/12/22 would like to know if any additional blood work is needed as he just had blood work done 04/12/22.  Please advise

## 2022-09-30 NOTE — TELEPHONE ENCOUNTER
1. Prediabetes (Primary)  Overview:  A1c 5.8 in 2021  Orders:  -     Hemoglobin A1C; Future; Expected date: 09/30/2022  2. Elevated PSA, less than 10 ng/ml  Overview:  PSA 4.8 8/2017 with BPH sx. Down to 1.6 2/2018 without tx. Dr Ange Bennett managing  Orders:  -     PSA Screen; Future; Expected date: 09/30/2022  3. Vitamin D deficiency  4. Mixed hyperlipidemia  Overview:  LDL  with TLC  Orders:  -     Comp Metabolic Panel (14); Future; Expected date: 09/30/2022  -     Lipid Panel; Future; Expected date: 09/30/2022  -     TSH W Reflex To Free T4; Future; Expected date: 09/30/2022  5. Primary osteoarthritis of both knees  Overview:  Progressively worse pain, prevented him from going up and down ladders and led to his early USP  Orders:  -     CBC, Platelet; No Differential; Future; Expected date: 09/30/2022  6. Encounter for examination of blood pressure without abnormal findings   -     CBC, Platelet; No Differential; Future; Expected date: 09/30/2022  7. Laboratory examination ordered as part of a routine general medical examination  -     Comp Metabolic Panel (14); Future; Expected date: 09/30/2022  -     Lipid Panel; Future; Expected date: 09/30/2022  -     CBC, Platelet; No Differential; Future; Expected date: 09/30/2022  -     TSH W Reflex To Free T4; Future; Expected date: 09/30/2022  8. Screening for prostate cancer  -     PSA Screen; Future; Expected date: 09/30/2022  9. Hyperglycemia  -     Hemoglobin A1C; Future; Expected date: 09/30/2022       OK to notify.  Thanks, Rahat Olivarez MD

## 2022-10-03 ENCOUNTER — LAB ENCOUNTER (OUTPATIENT)
Dept: LAB | Age: 62
End: 2022-10-03
Attending: FAMILY MEDICINE
Payer: MEDICARE

## 2022-10-03 DIAGNOSIS — Z12.5 SCREENING FOR PROSTATE CANCER: ICD-10-CM

## 2022-10-03 DIAGNOSIS — M17.0 PRIMARY OSTEOARTHRITIS OF BOTH KNEES: ICD-10-CM

## 2022-10-03 DIAGNOSIS — Z01.30 ENCOUNTER FOR EXAMINATION OF BLOOD PRESSURE WITHOUT ABNORMAL FINDINGS: ICD-10-CM

## 2022-10-03 DIAGNOSIS — R73.9 HYPERGLYCEMIA: ICD-10-CM

## 2022-10-03 DIAGNOSIS — E78.2 MIXED HYPERLIPIDEMIA: ICD-10-CM

## 2022-10-03 DIAGNOSIS — R73.03 PREDIABETES: ICD-10-CM

## 2022-10-03 DIAGNOSIS — R97.20 ELEVATED PSA, LESS THAN 10 NG/ML: ICD-10-CM

## 2022-10-03 DIAGNOSIS — Z00.00 LABORATORY EXAMINATION ORDERED AS PART OF A ROUTINE GENERAL MEDICAL EXAMINATION: ICD-10-CM

## 2022-10-03 LAB
ALBUMIN SERPL-MCNC: 3.6 G/DL (ref 3.4–5)
ALBUMIN/GLOB SERPL: 1.1 {RATIO} (ref 1–2)
ALP LIVER SERPL-CCNC: 92 U/L
ALT SERPL-CCNC: 27 U/L
ANION GAP SERPL CALC-SCNC: 8 MMOL/L (ref 0–18)
AST SERPL-CCNC: 22 U/L (ref 15–37)
BILIRUB SERPL-MCNC: 0.6 MG/DL (ref 0.1–2)
BUN BLD-MCNC: 17 MG/DL (ref 7–18)
BUN/CREAT SERPL: 17 (ref 10–20)
CALCIUM BLD-MCNC: 10.2 MG/DL (ref 8.5–10.1)
CHLORIDE SERPL-SCNC: 109 MMOL/L (ref 98–112)
CHOLEST SERPL-MCNC: 200 MG/DL (ref ?–200)
CO2 SERPL-SCNC: 22 MMOL/L (ref 21–32)
COMPLEXED PSA SERPL-MCNC: 1.71 NG/ML (ref ?–4)
CREAT BLD-MCNC: 1 MG/DL
DEPRECATED RDW RBC AUTO: 51.1 FL (ref 35.1–46.3)
ERYTHROCYTE [DISTWIDTH] IN BLOOD BY AUTOMATED COUNT: 14.1 % (ref 11–15)
EST. AVERAGE GLUCOSE BLD GHB EST-MCNC: 117 MG/DL (ref 68–126)
FASTING PATIENT LIPID ANSWER: YES
FASTING STATUS PATIENT QL REPORTED: YES
GFR SERPLBLD BASED ON 1.73 SQ M-ARVRAT: 85 ML/MIN/1.73M2 (ref 60–?)
GLOBULIN PLAS-MCNC: 3.2 G/DL (ref 2.8–4.4)
GLUCOSE BLD-MCNC: 86 MG/DL (ref 70–99)
HBA1C MFR BLD: 5.7 % (ref ?–5.7)
HCT VFR BLD AUTO: 46.6 %
HDLC SERPL-MCNC: 53 MG/DL (ref 40–59)
HGB BLD-MCNC: 15 G/DL
LDLC SERPL CALC-MCNC: 131 MG/DL (ref ?–100)
MCH RBC QN AUTO: 31.4 PG (ref 26–34)
MCHC RBC AUTO-ENTMCNC: 32.2 G/DL (ref 31–37)
MCV RBC AUTO: 97.5 FL
NONHDLC SERPL-MCNC: 147 MG/DL (ref ?–130)
OSMOLALITY SERPL CALC.SUM OF ELEC: 289 MOSM/KG (ref 275–295)
PLATELET # BLD AUTO: 251 10(3)UL (ref 150–450)
POTASSIUM SERPL-SCNC: 4.7 MMOL/L (ref 3.5–5.1)
PROT SERPL-MCNC: 6.8 G/DL (ref 6.4–8.2)
RBC # BLD AUTO: 4.78 X10(6)UL
SODIUM SERPL-SCNC: 139 MMOL/L (ref 136–145)
T4 FREE SERPL-MCNC: 1 NG/DL (ref 0.8–1.7)
TRIGL SERPL-MCNC: 91 MG/DL (ref 30–149)
TSI SER-ACNC: 4.02 MIU/ML (ref 0.36–3.74)
VLDLC SERPL CALC-MCNC: 16 MG/DL (ref 0–30)
WBC # BLD AUTO: 8 X10(3) UL (ref 4–11)

## 2022-10-03 PROCEDURE — 83036 HEMOGLOBIN GLYCOSYLATED A1C: CPT

## 2022-10-03 PROCEDURE — 84443 ASSAY THYROID STIM HORMONE: CPT

## 2022-10-03 PROCEDURE — 84439 ASSAY OF FREE THYROXINE: CPT

## 2022-10-03 PROCEDURE — 85027 COMPLETE CBC AUTOMATED: CPT

## 2022-10-03 PROCEDURE — 80053 COMPREHEN METABOLIC PANEL: CPT

## 2022-10-03 PROCEDURE — 80061 LIPID PANEL: CPT

## 2022-10-03 PROCEDURE — 36415 COLL VENOUS BLD VENIPUNCTURE: CPT

## 2022-10-11 NOTE — ASSESSMENT & PLAN NOTE
As for his Pre-Diabetes, it is well controlled, no significant medication side effects noted. Recommendations are: continue present meds, lose weight by increased dietary compliance and exercise and will check labs as ordered.     Lab Results   Component Value Date    A1C 5.7 (H) 10/03/2022    A1C 5.9 (H) 04/12/2022

## 2022-10-12 ENCOUNTER — OFFICE VISIT (OUTPATIENT)
Dept: FAMILY MEDICINE CLINIC | Facility: CLINIC | Age: 62
End: 2022-10-12
Payer: MEDICARE

## 2022-10-12 VITALS
HEART RATE: 82 BPM | BODY MASS INDEX: 36.2 KG/M2 | WEIGHT: 230.63 LBS | HEIGHT: 67 IN | SYSTOLIC BLOOD PRESSURE: 110 MMHG | DIASTOLIC BLOOD PRESSURE: 60 MMHG | RESPIRATION RATE: 18 BRPM

## 2022-10-12 DIAGNOSIS — M19.072 ARTHRITIS OF BOTH ANKLES: ICD-10-CM

## 2022-10-12 DIAGNOSIS — Z00.00 ENCOUNTER FOR ANNUAL HEALTH EXAMINATION: ICD-10-CM

## 2022-10-12 DIAGNOSIS — E78.2 MIXED HYPERLIPIDEMIA: ICD-10-CM

## 2022-10-12 DIAGNOSIS — I10 ESSENTIAL HYPERTENSION: ICD-10-CM

## 2022-10-12 DIAGNOSIS — M17.0 PRIMARY OSTEOARTHRITIS OF BOTH KNEES: ICD-10-CM

## 2022-10-12 DIAGNOSIS — M19.071 ARTHRITIS OF BOTH ANKLES: ICD-10-CM

## 2022-10-12 DIAGNOSIS — Z00.00 ANNUAL PHYSICAL EXAM: Primary | ICD-10-CM

## 2022-10-12 DIAGNOSIS — R97.20 ELEVATED PSA, LESS THAN 10 NG/ML: ICD-10-CM

## 2022-10-12 DIAGNOSIS — E03.8 SUBCLINICAL HYPOTHYROIDISM: ICD-10-CM

## 2022-10-12 DIAGNOSIS — R73.03 PREDIABETES: ICD-10-CM

## 2022-10-12 DIAGNOSIS — M54.50 LUMBAR BACK PAIN: ICD-10-CM

## 2022-10-12 RX ORDER — LISINOPRIL 10 MG/1
10 TABLET ORAL DAILY
Qty: 90 TABLET | Refills: 3 | Status: SHIPPED | OUTPATIENT
Start: 2022-10-12

## 2022-10-18 ENCOUNTER — HOSPITAL ENCOUNTER (OUTPATIENT)
Dept: GENERAL RADIOLOGY | Age: 62
Discharge: HOME | End: 2022-10-18
Attending: FAMILY MEDICINE
Payer: MEDICARE

## 2022-10-18 DIAGNOSIS — M54.50 LUMBAR BACK PAIN: ICD-10-CM

## 2022-10-18 PROCEDURE — 72110 X-RAY EXAM L-2 SPINE 4/>VWS: CPT | Performed by: FAMILY MEDICINE

## 2022-10-18 NOTE — PROGRESS NOTES
MRN: [id number], Cade Manzo is a 58 year old male admitted for   Chief Complaint   Patient presents with   • Chest Pain Adult   .    Admission Dt/Tm     10/13/2022  9:55 PM  Discharge DT/TM  10/17/2022  4:31 PM    Hospital Course  Patient was admitted with chest pain and was seen by Cardiology with EKG and troponins WNL. Ranexa and Amlodipine were started with Imdur with improvement in overall symptoms. Patient was taken for cardiac cath to rule out stentable lesions. Cath was negative and patient was discharged home on maximized medical therapy to follow up with PCP in one week and Cardiology in 2-3 weeks    Lab Results  Recent Results (from the past 48 hour(s))   GLUCOSE, BEDSIDE - POINT OF CARE    Collection Time: 10/15/22  8:43 PM   Result Value Ref Range    GLUCOSE, BEDSIDE - POINT OF CARE 218 (H) 70 - 99 mg/dL   Basic Metabolic Panel    Collection Time: 10/16/22  3:56 AM   Result Value Ref Range    Fasting Status      Sodium 137 135 - 145 mmol/L    Potassium 3.9 3.4 - 5.1 mmol/L    Chloride 108 97 - 110 mmol/L    Carbon Dioxide 21 21 - 32 mmol/L    Anion Gap 12 7 - 19 mmol/L    Glucose 173 (H) 70 - 99 mg/dL    BUN 16 6 - 20 mg/dL    Creatinine 0.87 0.67 - 1.17 mg/dL    Glomerular Filtration Rate >90 >=60    BUN/ Creatinine Ratio 18 7 - 25    Calcium 8.8 8.4 - 10.2 mg/dL   CBC No Differential    Collection Time: 10/16/22  3:56 AM   Result Value Ref Range    WBC 8.1 4.2 - 11.0 K/mcL    RBC 4.77 4.50 - 5.90 mil/mcL    HGB 15.5 13.0 - 17.0 g/dL    HCT 41.8 39.0 - 51.0 %    MCV 87.6 78.0 - 100.0 fl    MCH 32.5 26.0 - 34.0 pg    MCHC 37.1 (H) 32.0 - 36.5 g/dL     140 - 450 K/mcL    RDW-CV 12.4 11.0 - 15.0 %    RDW-SD 39.7 39.0 - 50.0 fL    NRBC 0 <=0 /100 WBC   Gold Top    Collection Time: 10/16/22  3:56 AM   Result Value Ref Range    Extra Tube Hold for Add Ons    GLUCOSE, BEDSIDE - POINT OF CARE    Collection Time: 10/16/22  8:17 AM   Result Value Ref Range    GLUCOSE, BEDSIDE - POINT OF  Your recent urine culture is normal is normal. Recommend follow up as scheduled. CARE 200 (H) 70 - 99 mg/dL   GLUCOSE, BEDSIDE - POINT OF CARE    Collection Time: 10/16/22 12:51 PM   Result Value Ref Range    GLUCOSE, BEDSIDE - POINT OF CARE 261 (H) 70 - 99 mg/dL   GLUCOSE, BEDSIDE - POINT OF CARE    Collection Time: 10/16/22  5:04 PM   Result Value Ref Range    GLUCOSE, BEDSIDE - POINT OF CARE 157 (H) 70 - 99 mg/dL   GLUCOSE, BEDSIDE - POINT OF CARE    Collection Time: 10/16/22  8:26 PM   Result Value Ref Range    GLUCOSE, BEDSIDE - POINT OF CARE 287 (H) 70 - 99 mg/dL   Basic Metabolic Panel    Collection Time: 10/17/22  4:26 AM   Result Value Ref Range    Fasting Status      Sodium 137 135 - 145 mmol/L    Potassium 4.0 3.4 - 5.1 mmol/L    Chloride 108 97 - 110 mmol/L    Carbon Dioxide 22 21 - 32 mmol/L    Anion Gap 11 7 - 19 mmol/L    Glucose 174 (H) 70 - 99 mg/dL    BUN 16 6 - 20 mg/dL    Creatinine 0.86 0.67 - 1.17 mg/dL    Glomerular Filtration Rate >90 >=60    BUN/ Creatinine Ratio 19 7 - 25    Calcium 8.9 8.4 - 10.2 mg/dL   CBC No Differential    Collection Time: 10/17/22  4:26 AM   Result Value Ref Range    WBC 7.2 4.2 - 11.0 K/mcL    RBC 4.57 4.50 - 5.90 mil/mcL    HGB 14.8 13.0 - 17.0 g/dL    HCT 41.0 39.0 - 51.0 %    MCV 89.7 78.0 - 100.0 fl    MCH 32.4 26.0 - 34.0 pg    MCHC 36.1 32.0 - 36.5 g/dL     140 - 450 K/mcL    RDW-CV 12.5 11.0 - 15.0 %    RDW-SD 41.1 39.0 - 50.0 fL    NRBC 0 <=0 /100 WBC   GLUCOSE, BEDSIDE - POINT OF CARE    Collection Time: 10/17/22  7:27 AM   Result Value Ref Range    GLUCOSE, BEDSIDE - POINT OF CARE 196 (H) 70 - 99 mg/dL   GLUCOSE, BEDSIDE - POINT OF CARE    Collection Time: 10/17/22  9:50 AM   Result Value Ref Range    GLUCOSE, BEDSIDE - POINT OF CARE 163 (H) 70 - 99 mg/dL       Imaging  Cath/PV Case   Final Result      XR CHEST PA AND LATERAL 2 VIEWS   Final Result   No acute abnormality is noted.       Electronically Signed by: NAY REBOLLEDO M.D.    Signed on: 10/13/2022 10:57 PM              Pathology  No specimens collected during this  procedure.    Test Results Pending At Discharge      Pertinent Physical Exam At Time of Discharge  Vitals reviewed.   Constitutional:       Appearance: Normal appearance. He is not ill-appearing.   HENT:      Head: Normocephalic and atraumatic.      Nose: Nose normal. No congestion or rhinorrhea.      Mouth/Throat:      Mouth: Mucous membranes are moist.      Pharynx: No oropharyngeal exudate or posterior oropharyngeal erythema.      Neck: Normal range of motion and neck supple. No rigidity or tenderness.   Eyes:      General: No scleral icterus.     Extraocular Movements: Extraocular movements intact.      Pupils: Pupils are equal, round, and reactive to light.   Cardiovascular:      Rate and Rhythm: Normal rate and regular rhythm.      Pulses: Normal pulses. Chest pain improving     Heart sounds: Normal heart sounds. No murmur heard.    No friction rub.   Pulmonary:      Effort: Pulmonary effort is normal. No respiratory distress.      Breath sounds: Normal breath sounds. No stridor. No wheezing or rhonchi.   Abdominal:      General: Abdomen is flat. Bowel sounds are normal. There is no distension.      Palpations: There is no mass.      Tenderness: There is no abdominal tenderness.      Hernia: No hernia is present.   Musculoskeletal:         General: No swelling, tenderness, deformity or signs of injury. Normal range of motion.   Skin:     General: Skin is warm.      Capillary Refill: Capillary refill takes less than 2 seconds.      Coloration: Skin is not jaundiced or pale.      Findings: No bruising or erythema.   Neurological:      General: No focal deficit present.      Mental Status: He is alert. Mental status is at baseline.      Cranial Nerves: No cranial nerve deficit.   Psychiatric:         Mood and Affect: Mood normal.     Discharge Diagnosis  Unstable angina    Plan  57 y/o M with hx of  NIDDM, hyperlipidemia, hypertension, CAD with hx of M, stents x 12, CABG and recurrent chest pain on Ranexa  presenting with chest pain     Chest pain with hx of CAD and MI with stents and CABG, unstable angina (POA)  -stress test in 7/2022 showed fixed defect  -ECHO ordered  -Cardiology consulted  -it seems that patient is having unstable angina. Cards to maximize medical therapy. Cath today - no interventions   -still having chest pain today but better  -Placed on CCB and ranexa and imdur continued - chest pain improving - discharge on this regimen     NIDDM  -restart metformin 2 days after discharge due to cath     Hyperlipidemia  -on repatha   -cont. Fenofibrate     Hypertension  -cont. Lisinopril    JAMES (POA)  -Cr. 1.34 - unsure why - normalized immediately     DVT prophylaxis  -SCDs     Discharge Instructions  Please continue ranexa, amlodipine and imdur  Please hold metformin for 2 days after cardiac cath  Please follow up with Cardiology in 2-3 weeks and PCP in one week    Discharge Medications     Summary of your Discharge Medications      Take these Medications      Details   acetaminophen 325 MG tablet  Commonly known as: TYLENOL   Take 2 tablets by mouth every 6 hours as needed for Pain.     amLODIPine 2.5 MG tablet  Commonly known as: NORVASC  Start taking on: October 18, 2022   Take 1 tablet by mouth daily. Do not start before October 18, 2022.     aspirin 325 MG EC tablet  Commonly known as: ECOTRIN   Take 325 mg by mouth daily.     atenolol 50 MG tablet  Commonly known as: TENORMIN   Take 50 mg by mouth daily.     atorvastatin 40 MG tablet  Commonly known as: LIPITOR   Take 40 mg by mouth daily.     clopidogrel 75 MG tablet  Commonly known as: PLAVIX   Take 75 mg by mouth daily.     EVOLocumab 140 MG/ML injection  Commonly known as: REPATHA   Inject 140 mg into the skin every 14 days.     fenofibrate 160 MG tablet   Take 160 mg by mouth daily.     isosorbide mononitrate 60 MG 24 hr tablet  Commonly known as: IMDUR   Take 60 mg by mouth daily.     lisinopril 10 MG tablet  Commonly known as: ZESTRIL   Take 10  mg by mouth daily.     metformin 1000 MG tablet  Commonly known as: GLUCOPHAGE   Take 1 tablet by mouth 2 times daily (with meals). Do not start before July 13, 2020.     nitroGLYCERIN 0.4 MG sublingual tablet  Commonly known as: NITROSTAT   Place 0.4 mg under the tongue every 5 minutes as needed for Chest pain.     Ranolazine 1000 MG TABLET SR 12 HR   Take 1,000 mg by mouth in the morning and 1,000 mg in the evening.     sildenafil 50 MG tablet  Commonly known as: VIAGRA   Take 50 mg by mouth as needed for Erectile Dysfunction.            Smoking Cessation  Counseling given: No   Comment: quit 20 years ago      Primary Care Physician  MD Quentin Alfredo MD  Hospitalist  [facility]  24 Hr Hospitalist Service with Same Physician Continuity of Care  [phone] [facility] 24hr Answering Service  [phone]

## 2023-01-06 ENCOUNTER — TELEPHONE (OUTPATIENT)
Dept: INTERNAL MEDICINE CLINIC | Facility: CLINIC | Age: 63
End: 2023-01-06

## 2023-01-06 RX ORDER — HYDROCODONE BITARTRATE AND ACETAMINOPHEN 5; 325 MG/1; MG/1
1 TABLET ORAL EVERY 6 HOURS PRN
Qty: 30 TABLET | Refills: 0 | Status: SHIPPED | OUTPATIENT
Start: 2023-01-06

## 2023-01-07 NOTE — TELEPHONE ENCOUNTER
Pt called this evening w severe R jaw pain for the last 2 weeks    Has seen his dentist   and endodontist as well as oral surgeon   Had root canal     Still in severe pain, even if tongue touches the tooth    They wanted [facility] to see pain specialist [facility] but has been unable to contact them to make an appt     Advised he has a type of neuropathy  Advil and tylenol not helping  [place] ordered into [employer]

## 2023-01-10 ENCOUNTER — TELEPHONE (OUTPATIENT)
Dept: NEUROLOGY | Facility: CLINIC | Age: 63
End: 2023-01-10

## 2023-01-10 ENCOUNTER — TELEPHONE (OUTPATIENT)
Dept: FAMILY MEDICINE CLINIC | Facility: CLINIC | Age: 63
End: 2023-01-10

## 2023-01-10 DIAGNOSIS — G50.0 TRIGEMINAL NEURALGIA: Primary | ICD-10-CM

## 2023-01-10 NOTE — TELEPHONE ENCOUNTER
Dr. Valentino Mayotte please see note below. Patient last seen by you in October. Who do you recommend patient see for this?

## 2023-01-10 NOTE — TELEPHONE ENCOUNTER
Information given to Pt . Pt has appt with Dr. Amarilis Cochran 5/10/22, oral surgeon working on getting Pt seen sooner.

## 2023-01-10 NOTE — TELEPHONE ENCOUNTER
Dr. Susan Peñaloza, Oral surgeon, is calling to have pt scheduled to be seen sooner rather than later.  Pt is already scheduled on 5/10/23 and is currently on the wait list. Dr. Susan Peñaloza states he can be reached on his cell to discuss at [phone]

## 2023-01-10 NOTE — TELEPHONE ENCOUNTER
Orders Placed This Encounter      NEURO - INTERNAL          Referral Priority:Routine          Referral Type:OFFICE VISIT          Referred to Kimmy Hearn DO          Requested Specialty:NEUROLOGY          Number of Visits Requested:1        OK to notify.  Thanks, Irving Patiño MD

## 2023-01-10 NOTE — TELEPHONE ENCOUNTER
RN called number provided for Dr. Cherelle Phelps and answering service answered. Per answering service request, RN to call back during business hours.

## 2023-01-10 NOTE — TELEPHONE ENCOUNTER
Spoke to pt who was in pain. He recently saw a oral surgeon and was diagnosed with trigeminal neuropathy.   Pt is requesting a referral for a neurologist.

## 2023-01-11 NOTE — TELEPHONE ENCOUNTER
Dr Gilda Starkey is calling asking why pt didn't receive a call; I infomr Dr that the RNs called his office and got the answering service;please advise    Cell [phone];  said to [facility] please - \"it's a female vm but it him\"

## 2023-01-11 NOTE — TELEPHONE ENCOUNTER
RN spoke with Dr. Vero Le and patient is having significant right side trigeminal neuralgia pain. Per Dr. Vero Le, pt does not need to be seen with Dr. Corona Prince and can see first available. Held appt for Dr. Cally Anderson on 1/13/2022 at [phone]. Confirmed appt time with patient and he accepted. Dr. Vero Le informed.

## 2023-01-13 ENCOUNTER — OFFICE VISIT (OUTPATIENT)
Dept: NEUROLOGY | Facility: CLINIC | Age: 63
End: 2023-01-13
Payer: MEDICARE

## 2023-01-13 VITALS
WEIGHT: 227 LBS | BODY MASS INDEX: 36 KG/M2 | SYSTOLIC BLOOD PRESSURE: 126 MMHG | HEART RATE: 82 BPM | RESPIRATION RATE: 16 BRPM | DIASTOLIC BLOOD PRESSURE: 72 MMHG

## 2023-01-13 DIAGNOSIS — G50.0 RIGHT TRIGEMINAL NEURALGIA: Primary | ICD-10-CM

## 2023-01-13 PROCEDURE — 99204 OFFICE O/P NEW MOD 45 MIN: CPT | Performed by: OTHER

## 2023-01-13 RX ORDER — CARBAMAZEPINE 200 MG/1
TABLET ORAL
Qty: 90 TABLET | Refills: 0 | Status: SHIPPED | OUTPATIENT
Start: 2023-01-13

## 2023-01-13 NOTE — PROGRESS NOTES
Patient states right sided facial pain, on tooth number 28. Patient states this started about 4-5 years. Patient states pain was off and on. Patient states sensitivity since 10/2022. Patient states some pain with tooth directly above tooth 28. Patient states pain while chewing, talking and brushing. Patient favors the left side of his face. Patient had injection for the pain with no benefit.

## 2023-01-24 ENCOUNTER — HOSPITAL ENCOUNTER (OUTPATIENT)
Dept: MRI IMAGING | Facility: HOSPITAL | Age: 63
Discharge: HOME | End: 2023-01-24
Attending: Other
Payer: MEDICARE

## 2023-01-24 DIAGNOSIS — G50.0 RIGHT TRIGEMINAL NEURALGIA: ICD-10-CM

## 2023-01-24 PROCEDURE — A9575 INJ GADOTERATE MEGLUMI 0.1ML: HCPCS | Performed by: OTHER

## 2023-01-24 PROCEDURE — 70553 MRI BRAIN STEM W/O & W/DYE: CPT | Performed by: OTHER

## 2023-01-24 PROCEDURE — 70546 MR ANGIOGRAPH HEAD W/O&W/DYE: CPT | Performed by: OTHER

## 2023-01-24 RX ORDER — GADOTERATE MEGLUMINE 376.9 MG/ML
20 INJECTION INTRAVENOUS
Status: COMPLETED | OUTPATIENT
Start: 2023-01-24 | End: 2023-01-24

## 2023-01-24 RX ADMIN — GADOTERATE MEGLUMINE 20 ML: 376.9 INJECTION INTRAVENOUS at 20:00:00

## 2023-01-25 ENCOUNTER — TELEPHONE (OUTPATIENT)
Dept: SURGERY | Facility: CLINIC | Age: 63
End: 2023-01-25

## 2023-01-26 ENCOUNTER — LAB ENCOUNTER (OUTPATIENT)
Dept: LAB | Age: 63
End: 2023-01-26
Attending: Other
Payer: MEDICARE

## 2023-01-26 ENCOUNTER — TELEPHONE (OUTPATIENT)
Dept: FAMILY MEDICINE CLINIC | Facility: CLINIC | Age: 63
End: 2023-01-26

## 2023-01-26 DIAGNOSIS — G50.0 RIGHT TRIGEMINAL NEURALGIA: ICD-10-CM

## 2023-01-26 LAB
ALBUMIN SERPL-MCNC: 3.6 G/DL (ref 3.4–5)
ALBUMIN/GLOB SERPL: 1 {RATIO} (ref 1–2)
ALP LIVER SERPL-CCNC: 120 U/L
ALT SERPL-CCNC: 26 U/L
ANION GAP SERPL CALC-SCNC: 8 MMOL/L (ref 0–18)
AST SERPL-CCNC: 24 U/L (ref 15–37)
BASOPHILS # BLD AUTO: 0.08 X10(3) UL (ref 0–0.2)
BASOPHILS NFR BLD AUTO: 1.1 %
BILIRUB SERPL-MCNC: 0.4 MG/DL (ref 0.1–2)
BUN BLD-MCNC: 11 MG/DL (ref 7–18)
CALCIUM BLD-MCNC: 9.9 MG/DL (ref 8.5–10.1)
CHLORIDE SERPL-SCNC: 104 MMOL/L (ref 98–112)
CO2 SERPL-SCNC: 27 MMOL/L (ref 21–32)
CREAT BLD-MCNC: 0.95 MG/DL
EOSINOPHIL # BLD AUTO: 0.5 X10(3) UL (ref 0–0.7)
EOSINOPHIL NFR BLD AUTO: 7 %
ERYTHROCYTE [DISTWIDTH] IN BLOOD BY AUTOMATED COUNT: 14.2 %
FASTING STATUS PATIENT QL REPORTED: YES
GFR SERPLBLD BASED ON 1.73 SQ M-ARVRAT: 90 ML/MIN/1.73M2 (ref 60–?)
GLOBULIN PLAS-MCNC: 3.5 G/DL (ref 2.8–4.4)
GLUCOSE BLD-MCNC: 92 MG/DL (ref 70–99)
HCT VFR BLD AUTO: 46.6 %
HGB BLD-MCNC: 15.3 G/DL
IMM GRANULOCYTES # BLD AUTO: 0.02 X10(3) UL (ref 0–1)
IMM GRANULOCYTES NFR BLD: 0.3 %
LYMPHOCYTES # BLD AUTO: 1.29 X10(3) UL (ref 1–4)
LYMPHOCYTES NFR BLD AUTO: 18.1 %
MCH RBC QN AUTO: 31.5 PG (ref 26–34)
MCHC RBC AUTO-ENTMCNC: 32.8 G/DL (ref 31–37)
MCV RBC AUTO: 96.1 FL
MONOCYTES # BLD AUTO: 1.39 X10(3) UL (ref 0.1–1)
MONOCYTES NFR BLD AUTO: 19.5 %
NEUTROPHILS # BLD AUTO: 3.84 X10 (3) UL (ref 1.5–7.7)
NEUTROPHILS # BLD AUTO: 3.84 X10(3) UL (ref 1.5–7.7)
NEUTROPHILS NFR BLD AUTO: 54 %
OSMOLALITY SERPL CALC.SUM OF ELEC: 287 MOSM/KG (ref 275–295)
PLATELET # BLD AUTO: 159 10(3)UL (ref 150–450)
POTASSIUM SERPL-SCNC: 3.9 MMOL/L (ref 3.5–5.1)
PROT SERPL-MCNC: 7.1 G/DL (ref 6.4–8.2)
RBC # BLD AUTO: 4.85 X10(6)UL
SODIUM SERPL-SCNC: 139 MMOL/L (ref 136–145)
WBC # BLD AUTO: 7.1 X10(3) UL (ref 4–11)

## 2023-01-26 PROCEDURE — 80053 COMPREHEN METABOLIC PANEL: CPT

## 2023-01-26 PROCEDURE — 85025 COMPLETE CBC W/AUTO DIFF WBC: CPT

## 2023-01-26 PROCEDURE — 36415 COLL VENOUS BLD VENIPUNCTURE: CPT

## 2023-01-26 NOTE — TELEPHONE ENCOUNTER
Pt c/o congestion x 3 days. Cough has already subsided. Reviewed that this is most commonly viral and we would advise supportive home care with OTC meds for sx, rest and fluids. Instructed to call back if sx worsen or fail to improve. He verbalized understanding and agrees with plan.

## 2023-02-07 DIAGNOSIS — G50.0 RIGHT TRIGEMINAL NEURALGIA: Primary | ICD-10-CM

## 2023-02-08 RX ORDER — CARBAMAZEPINE 200 MG/1
TABLET ORAL
Qty: 90 TABLET | Refills: 2 | Status: SHIPPED | OUTPATIENT
Start: 2023-02-08

## 2023-02-08 NOTE — TELEPHONE ENCOUNTER
Medication: CARBAMAZEPINE 200 MG Oral Tab     Date of last refill: 01/13/2023 (#90/0)  [place]     Last office visit: 01/13/2023  Due back to clinic per last office note:  Not stated  Date next office visit scheduled:    Future Appointments   Date Time Provider Toribio Jyoti   3/16/2023 10:45 AM Vicente Squires MD [facility]   4/12/2023 10:00 AM Anish Lira MD [facility] [place]   4/17/2023  2:55 PM Syed Miguel DO [facility] [place]           Last OV note recommendation:    Discussion/Plan:  Probable trigeminal neuralgia, with focal tooth sensitivity, and dental causes ruled out  Recommend MRI/MRA brain trigeminal protocol  Discussed trigeminal neuralgia and range of treatment   Recommend initiation of carbamazepine, followed by CBC/CMP in a few weeks  Discussed a few possible side effects of carbamazepine

## 2023-03-01 LAB — INR: 1 (ref 0.8–1.2)

## 2023-03-31 ENCOUNTER — HOSPITAL ENCOUNTER (OUTPATIENT)
Dept: ULTRASOUND IMAGING | Age: 63
Discharge: HOME | End: 2023-03-31
Attending: FAMILY MEDICINE
Payer: MEDICARE

## 2023-03-31 ENCOUNTER — TELEPHONE (OUTPATIENT)
Dept: FAMILY MEDICINE CLINIC | Facility: CLINIC | Age: 63
End: 2023-03-31

## 2023-03-31 ENCOUNTER — OFFICE VISIT (OUTPATIENT)
Dept: FAMILY MEDICINE CLINIC | Facility: CLINIC | Age: 63
End: 2023-03-31
Payer: MEDICARE

## 2023-03-31 VITALS
SYSTOLIC BLOOD PRESSURE: 132 MMHG | TEMPERATURE: 99 F | RESPIRATION RATE: 14 BRPM | HEART RATE: 86 BPM | HEIGHT: 67 IN | DIASTOLIC BLOOD PRESSURE: 60 MMHG | BODY MASS INDEX: 34.09 KG/M2 | WEIGHT: 217.19 LBS

## 2023-03-31 DIAGNOSIS — U07.1 COVID-19 VIRUS INFECTION: ICD-10-CM

## 2023-03-31 DIAGNOSIS — M79.605 PAIN IN LEFT LEG: ICD-10-CM

## 2023-03-31 DIAGNOSIS — I77.89: ICD-10-CM

## 2023-03-31 DIAGNOSIS — I82.432 ACUTE DEEP VEIN THROMBOSIS (DVT) OF POPLITEAL VEIN OF LEFT LOWER EXTREMITY (HCC): Primary | ICD-10-CM

## 2023-03-31 DIAGNOSIS — G50.0 TRIGEMINAL NEURALGIA: ICD-10-CM

## 2023-03-31 DIAGNOSIS — R60.0 EDEMA OF LEFT LOWER LEG: ICD-10-CM

## 2023-03-31 PROCEDURE — 93971 EXTREMITY STUDY: CPT | Performed by: FAMILY MEDICINE

## 2023-03-31 PROCEDURE — 99215 OFFICE O/P EST HI 40 MIN: CPT | Performed by: FAMILY MEDICINE

## 2023-03-31 NOTE — TELEPHONE ENCOUNTER
Raman Nunes getting a stat ultrasound. If abnormal organ to start Eliquis 10 mg (2 x 5 mg tablets) twice daily for 1 week followed by 5 mg twice daily for 1 week.   I gave him enough for the first 2 weeks

## 2023-03-31 NOTE — TELEPHONE ENCOUNTER
Pt wife calling wondering if Mateus Osborn needs to go to the hospital after his visit with Dr. Imani Freeman was at 2pm today and she missed the call from

## 2023-03-31 NOTE — ASSESSMENT & PLAN NOTE
Diagnosis today, continue to monotor, education, and to ED if SOB occurs as it can take 2-3 days to start this.

## 2023-03-31 NOTE — PATIENT INSTRUCTIONS
If scan comes back as DVT, eliquis 5 mg, 2 tabs (10 mg) twice a day for 7 days then 5 mg (1 tab) twice a day for 3 to 6 months

## 2023-04-01 NOTE — TELEPHONE ENCOUNTER
abigail called back and we went over taking the medication and what to expect and when it there a problem and he should go to the ED to be seen.  He will call back if he has any questions

## 2023-04-03 ENCOUNTER — TELEPHONE (OUTPATIENT)
Dept: FAMILY MEDICINE CLINIC | Facility: CLINIC | Age: 63
End: 2023-04-03

## 2023-04-03 ENCOUNTER — TELEPHONE (OUTPATIENT)
Dept: SURGERY | Facility: CLINIC | Age: 63
End: 2023-04-03

## 2023-04-03 DIAGNOSIS — R73.03 PREDIABETES: ICD-10-CM

## 2023-04-03 DIAGNOSIS — Z00.00 LABORATORY EXAMINATION ORDERED AS PART OF A ROUTINE GENERAL MEDICAL EXAMINATION: ICD-10-CM

## 2023-04-03 DIAGNOSIS — R73.9 HYPERGLYCEMIA: ICD-10-CM

## 2023-04-03 DIAGNOSIS — R97.20 ELEVATED PSA, LESS THAN 10 NG/ML: ICD-10-CM

## 2023-04-03 DIAGNOSIS — E55.9 VITAMIN D DEFICIENCY: ICD-10-CM

## 2023-04-03 DIAGNOSIS — E03.8 SUBCLINICAL HYPOTHYROIDISM: ICD-10-CM

## 2023-04-03 DIAGNOSIS — E78.2 MIXED HYPERLIPIDEMIA: Primary | ICD-10-CM

## 2023-04-03 DIAGNOSIS — Z51.81 MEDICATION MONITORING ENCOUNTER: ICD-10-CM

## 2023-04-03 NOTE — TELEPHONE ENCOUNTER
Pt has a PCP appt on 4/12/23. The PCP always orders full blood panel for his appt. Pt wants to know if Provider will issue additional orders for blood work, or will the PCP blood work be sufficient for his 4/14/23 appt with Provider. Pt's best call back number is [phone]. Endorsed to RN for Provider.

## 2023-04-04 NOTE — TELEPHONE ENCOUNTER
Patient advised no further blood work needed outside of what PCP ordered. Confirmed Appointment on 4/17/23.

## 2023-04-04 NOTE — TELEPHONE ENCOUNTER
A repeat CBC and CMP will be great. No additional labs. Has appt with me 4/17 not 4/14 per epic, in case he mixed it up.

## 2023-04-04 NOTE — TELEPHONE ENCOUNTER
1. Mixed hyperlipidemia (Primary)  Overview:  LDL  with TLC  Orders:  -     Lipid Panel; Future; Expected date: 04/03/2023  -     TSH W Reflex To Free T4; Future; Expected date: 04/03/2023  -     Comp Metabolic Panel (14); Future; Expected date: 04/03/2023  2. Vitamin D deficiency  -     Comp Metabolic Panel (14); Future; Expected date: 04/03/2023  -     CBC, Platelet; No Differential; Future; Expected date: 04/03/2023  3. Subclinical hypothyroidism  Overview:  TSH 4 in 2022  4. Prediabetes  Overview:  A1c 5.8 in 2021  Orders:  -     Hemoglobin A1C; Future; Expected date: 04/03/2023  5. Elevated PSA, less than 10 ng/ml  Overview:  PSA 4.8 8/2017 with BPH sx. Down to 1.6 2/2018 without tx. Dr Brittny Cesar  6. Medication monitoring encounter  -     Carbamazepine (Tegretol) Total; Future; Expected date: 04/03/2023  -     CBC, Platelet; No Differential; Future; Expected date: 04/03/2023  7. Laboratory examination ordered as part of a routine general medical examination  -     Lipid Panel; Future; Expected date: 04/03/2023  -     TSH W Reflex To Free T4; Future; Expected date: 04/03/2023  -     Comp Metabolic Panel (14); Future; Expected date: 04/03/2023  -     CBC, Platelet; No Differential; Future; Expected date: 04/03/2023  8. Hyperglycemia  -     Hemoglobin A1C; Future; Expected date: 04/03/2023       OK to notify.  Thanks, Denise Ruiz MD

## 2023-04-07 ENCOUNTER — LAB ENCOUNTER (OUTPATIENT)
Dept: LAB | Age: 63
End: 2023-04-07
Attending: FAMILY MEDICINE
Payer: MEDICARE

## 2023-04-07 DIAGNOSIS — E55.9 VITAMIN D DEFICIENCY: ICD-10-CM

## 2023-04-07 DIAGNOSIS — E78.2 MIXED HYPERLIPIDEMIA: ICD-10-CM

## 2023-04-07 DIAGNOSIS — R73.9 HYPERGLYCEMIA: ICD-10-CM

## 2023-04-07 DIAGNOSIS — Z51.81 MEDICATION MONITORING ENCOUNTER: ICD-10-CM

## 2023-04-07 DIAGNOSIS — Z00.00 LABORATORY EXAMINATION ORDERED AS PART OF A ROUTINE GENERAL MEDICAL EXAMINATION: ICD-10-CM

## 2023-04-07 DIAGNOSIS — R73.03 PREDIABETES: ICD-10-CM

## 2023-04-07 LAB
ALBUMIN SERPL-MCNC: 3.5 G/DL (ref 3.4–5)
ALBUMIN/GLOB SERPL: 0.9 {RATIO} (ref 1–2)
ALP LIVER SERPL-CCNC: 133 U/L
ALT SERPL-CCNC: 40 U/L
ANION GAP SERPL CALC-SCNC: 6 MMOL/L (ref 0–18)
AST SERPL-CCNC: 27 U/L (ref 15–37)
BILIRUB SERPL-MCNC: 0.4 MG/DL (ref 0.1–2)
BUN BLD-MCNC: 13 MG/DL (ref 7–18)
CALCIUM BLD-MCNC: 9.6 MG/DL (ref 8.5–10.1)
CARBAMAZEPINE SERPL-MCNC: 8.9 UG/ML (ref 4–12)
CHLORIDE SERPL-SCNC: 106 MMOL/L (ref 98–112)
CHOLEST SERPL-MCNC: 215 MG/DL (ref ?–200)
CO2 SERPL-SCNC: 26 MMOL/L (ref 21–32)
CREAT BLD-MCNC: 0.9 MG/DL
ERYTHROCYTE [DISTWIDTH] IN BLOOD BY AUTOMATED COUNT: 13.6 %
EST. AVERAGE GLUCOSE BLD GHB EST-MCNC: 120 MG/DL (ref 68–126)
FASTING PATIENT LIPID ANSWER: YES
FASTING STATUS PATIENT QL REPORTED: YES
GFR SERPLBLD BASED ON 1.73 SQ M-ARVRAT: 97 ML/MIN/1.73M2 (ref 60–?)
GLOBULIN PLAS-MCNC: 3.7 G/DL (ref 2.8–4.4)
GLUCOSE BLD-MCNC: 95 MG/DL (ref 70–99)
HBA1C MFR BLD: 5.8 % (ref ?–5.7)
HCT VFR BLD AUTO: 42.3 %
HDLC SERPL-MCNC: 51 MG/DL (ref 40–59)
HGB BLD-MCNC: 13.7 G/DL
LDLC SERPL CALC-MCNC: 145 MG/DL (ref ?–100)
MCH RBC QN AUTO: 31.1 PG (ref 26–34)
MCHC RBC AUTO-ENTMCNC: 32.4 G/DL (ref 31–37)
MCV RBC AUTO: 95.9 FL
NONHDLC SERPL-MCNC: 164 MG/DL (ref ?–130)
OSMOLALITY SERPL CALC.SUM OF ELEC: 286 MOSM/KG (ref 275–295)
PLATELET # BLD AUTO: 320 10(3)UL (ref 150–450)
POTASSIUM SERPL-SCNC: 3.8 MMOL/L (ref 3.5–5.1)
PROT SERPL-MCNC: 7.2 G/DL (ref 6.4–8.2)
RBC # BLD AUTO: 4.41 X10(6)UL
SODIUM SERPL-SCNC: 138 MMOL/L (ref 136–145)
TRIGL SERPL-MCNC: 106 MG/DL (ref 30–149)
TSI SER-ACNC: 3.35 MIU/ML (ref 0.36–3.74)
VLDLC SERPL CALC-MCNC: 20 MG/DL (ref 0–30)
WBC # BLD AUTO: 5 X10(3) UL (ref 4–11)

## 2023-04-07 PROCEDURE — 84443 ASSAY THYROID STIM HORMONE: CPT

## 2023-04-07 PROCEDURE — 85027 COMPLETE CBC AUTOMATED: CPT

## 2023-04-07 PROCEDURE — 80053 COMPREHEN METABOLIC PANEL: CPT

## 2023-04-07 PROCEDURE — 80156 ASSAY CARBAMAZEPINE TOTAL: CPT

## 2023-04-07 PROCEDURE — 83036 HEMOGLOBIN GLYCOSYLATED A1C: CPT

## 2023-04-07 PROCEDURE — 36415 COLL VENOUS BLD VENIPUNCTURE: CPT

## 2023-04-07 PROCEDURE — 80061 LIPID PANEL: CPT

## 2023-04-11 NOTE — ASSESSMENT & PLAN NOTE
Cholesterol shows Good control. Cholesterol: 215, done on 4/7/2023. HDL Cholesterol: 51, done on 4/7/2023. TriGlycerides 106, done on 4/7/2023. LDL Cholesterol: 145, done on 4/7/2023. No current Cholesterol medication.   Continue to encourage meds

## 2023-04-11 NOTE — ASSESSMENT & PLAN NOTE
BP shows good control with last BP of 132/60. Continue lifestyle changes, diet, exercise and weight loss. Last K was 3.8 done on 4/7/2023. Last Cr was 0.9 done on 4/7/2023. Hypertension meds include lisinopril 10 MG Oral Tab [[id number]].   Stable, continue present management

## 2023-04-12 ENCOUNTER — OFFICE VISIT (OUTPATIENT)
Dept: FAMILY MEDICINE CLINIC | Facility: CLINIC | Age: 63
End: 2023-04-12
Payer: MEDICARE

## 2023-04-12 VITALS
SYSTOLIC BLOOD PRESSURE: 130 MMHG | WEIGHT: 213 LBS | HEIGHT: 67 IN | HEART RATE: 70 BPM | RESPIRATION RATE: 14 BRPM | BODY MASS INDEX: 33.43 KG/M2 | DIASTOLIC BLOOD PRESSURE: 66 MMHG

## 2023-04-12 DIAGNOSIS — I82.432 ACUTE DEEP VEIN THROMBOSIS (DVT) OF POPLITEAL VEIN OF LEFT LOWER EXTREMITY (HCC): Primary | ICD-10-CM

## 2023-04-12 DIAGNOSIS — E78.2 MIXED HYPERLIPIDEMIA: ICD-10-CM

## 2023-04-12 DIAGNOSIS — I10 ESSENTIAL HYPERTENSION: ICD-10-CM

## 2023-04-12 PROCEDURE — 99214 OFFICE O/P EST MOD 30 MIN: CPT | Performed by: FAMILY MEDICINE

## 2023-04-17 ENCOUNTER — TELEPHONE (OUTPATIENT)
Dept: FAMILY MEDICINE CLINIC | Facility: CLINIC | Age: 63
End: 2023-04-17

## 2023-04-17 ENCOUNTER — OFFICE VISIT (OUTPATIENT)
Dept: NEUROLOGY | Facility: CLINIC | Age: 63
End: 2023-04-17
Payer: MEDICARE

## 2023-04-17 VITALS
WEIGHT: 213 LBS | HEART RATE: 82 BPM | BODY MASS INDEX: 33 KG/M2 | RESPIRATION RATE: 16 BRPM | SYSTOLIC BLOOD PRESSURE: 122 MMHG | DIASTOLIC BLOOD PRESSURE: 68 MMHG

## 2023-04-17 DIAGNOSIS — G50.0 RIGHT TRIGEMINAL NEURALGIA: Primary | ICD-10-CM

## 2023-04-17 DIAGNOSIS — I82.402 ACUTE DEEP VEIN THROMBOSIS (DVT) OF LEFT LOWER EXTREMITY, UNSPECIFIED VEIN (HCC): ICD-10-CM

## 2023-04-17 PROCEDURE — 99213 OFFICE O/P EST LOW 20 MIN: CPT | Performed by: OTHER

## 2023-04-17 RX ORDER — CARBAMAZEPINE 200 MG/1
TABLET ORAL
Qty: 270 TABLET | Refills: 3 | Status: SHIPPED | OUTPATIENT
Start: 2023-04-17

## 2023-04-17 NOTE — TELEPHONE ENCOUNTER
Dr Steffen Hugo calling to notify Dr. Danette Yousif that she has pt on carbamazepine for trigeminal neuralgia. She states this can decrease levels of Eliquis. She wanted Dr. Danette Yousif to be aware in case he feels pt should be switched to Coumadin. Pt currently on Eliquis for DVT s/p Covid.      Routed to covering for Dr. Danette Yousif

## 2023-04-17 NOTE — PROGRESS NOTES
Pt states is doing better. Pt states does have concerns about side effects of medication Carbamazepine. Would like to go back down on medication.

## 2023-04-18 ENCOUNTER — TELEPHONE (OUTPATIENT)
Dept: FAMILY MEDICINE CLINIC | Facility: CLINIC | Age: 63
End: 2023-04-18

## 2023-04-18 NOTE — TELEPHONE ENCOUNTER
Called and talked to patient he was seen by Dr Kolton Rodriguez who said that the carbamazepine will interfere with the Eliquis so it won't work. He is very concerned about this and thnks it should have been caught before he started this. I assured him that we were aware of this as the computer system lets us know when ever there is an intercation with medications. His leg is still swollen and he is concerned the medication is not working. And wants Dr Jenn Hernadez to comment about this.

## 2023-04-18 NOTE — TELEPHONE ENCOUNTER
Pt is calling because there is an interaction between 2 of his meds for his blood clot.  And feels that it should have been caught when he first started the meds    apixaban (ELIQUIS) 5 MG Oral Tab   carBAMazepine 200 MG Oral Tab

## 2023-04-20 ENCOUNTER — OFFICE VISIT (OUTPATIENT)
Dept: FAMILY MEDICINE CLINIC | Facility: CLINIC | Age: 63
End: 2023-04-20
Payer: MEDICARE

## 2023-04-20 VITALS
SYSTOLIC BLOOD PRESSURE: 128 MMHG | HEART RATE: 86 BPM | DIASTOLIC BLOOD PRESSURE: 60 MMHG | HEIGHT: 67 IN | WEIGHT: 213 LBS | BODY MASS INDEX: 33.43 KG/M2 | RESPIRATION RATE: 18 BRPM

## 2023-04-20 DIAGNOSIS — I82.432 ACUTE DEEP VEIN THROMBOSIS (DVT) OF POPLITEAL VEIN OF LEFT LOWER EXTREMITY (HCC): Primary | ICD-10-CM

## 2023-04-20 PROCEDURE — 99214 OFFICE O/P EST MOD 30 MIN: CPT | Performed by: FAMILY MEDICINE

## 2023-04-20 RX ORDER — WARFARIN SODIUM 5 MG/1
5 TABLET ORAL NIGHTLY
Qty: 90 TABLET | Refills: 1 | Status: SHIPPED | OUTPATIENT
Start: 2023-04-20

## 2023-04-20 RX ORDER — WARFARIN SODIUM 5 MG/1
5 TABLET ORAL NIGHTLY
Qty: 90 TABLET | Refills: 1 | Status: SHIPPED | OUTPATIENT
Start: 2023-04-20 | End: 2023-04-20

## 2023-04-20 NOTE — PATIENT INSTRUCTIONS
Starting on 10 mg tonight then 5 mg nightly, lab Monday. DEMETRICE 4/20/2023     Continue eliqus 2x daily until we get INR of 2.0 or greater. Target is 2.0-3.0 and it can take 5 days to get to this level.

## 2023-04-20 NOTE — TELEPHONE ENCOUNTER
LMOM for Farzaneh Middleton to return a call to make an appointment to be seen per 's request today at 12:15p. Tried calling a 2nd time. Still no answer. Sent [employer] message asking Farzaneh Middleton to call to make an appointment.     Routed to [email or web address]    For your information  Routed to [facility]

## 2023-04-24 ENCOUNTER — LAB ENCOUNTER (OUTPATIENT)
Dept: LAB | Age: 63
End: 2023-04-24
Attending: FAMILY MEDICINE
Payer: MEDICARE

## 2023-04-24 DIAGNOSIS — I82.432 ACUTE DEEP VEIN THROMBOSIS (DVT) OF POPLITEAL VEIN OF LEFT LOWER EXTREMITY (HCC): ICD-10-CM

## 2023-04-24 LAB
INR BLD: 1.8 (ref 0.85–1.16)
PROTHROMBIN TIME: 20.8 SECONDS (ref 11.6–14.8)

## 2023-04-24 PROCEDURE — 85610 PROTHROMBIN TIME: CPT

## 2023-04-24 PROCEDURE — 36415 COLL VENOUS BLD VENIPUNCTURE: CPT

## 2023-04-27 ENCOUNTER — LAB ENCOUNTER (OUTPATIENT)
Dept: LAB | Age: 63
End: 2023-04-27
Attending: FAMILY MEDICINE
Payer: MEDICARE

## 2023-04-27 ENCOUNTER — ANTI-COAG VISIT (OUTPATIENT)
Dept: FAMILY MEDICINE CLINIC | Facility: CLINIC | Age: 63
End: 2023-04-27

## 2023-04-27 DIAGNOSIS — I82.432 ACUTE DEEP VEIN THROMBOSIS (DVT) OF POPLITEAL VEIN OF LEFT LOWER EXTREMITY (HCC): ICD-10-CM

## 2023-04-27 LAB
INR BLD: 1.37 (ref 0.85–1.16)
PROTHROMBIN TIME: 16.9 SECONDS (ref 11.6–14.8)

## 2023-04-27 PROCEDURE — 85610 PROTHROMBIN TIME: CPT

## 2023-04-27 PROCEDURE — 36415 COLL VENOUS BLD VENIPUNCTURE: CPT

## 2023-05-01 ENCOUNTER — LAB ENCOUNTER (OUTPATIENT)
Dept: LAB | Age: 63
End: 2023-05-01
Attending: FAMILY MEDICINE
Payer: MEDICARE

## 2023-05-01 ENCOUNTER — ANTI-COAG VISIT (OUTPATIENT)
Dept: FAMILY MEDICINE CLINIC | Facility: CLINIC | Age: 63
End: 2023-05-01

## 2023-05-01 DIAGNOSIS — I82.432 ACUTE DEEP VEIN THROMBOSIS (DVT) OF POPLITEAL VEIN OF LEFT LOWER EXTREMITY (HCC): ICD-10-CM

## 2023-05-01 LAB
INR BLD: 1.73 (ref 0.85–1.16)
PROTHROMBIN TIME: 20.1 SECONDS (ref 11.6–14.8)

## 2023-05-01 PROCEDURE — 85610 PROTHROMBIN TIME: CPT

## 2023-05-01 PROCEDURE — 36415 COLL VENOUS BLD VENIPUNCTURE: CPT

## 2023-05-02 NOTE — PROGRESS NOTES
On 10 alt with 5 mg, switch to 10 mg most days, 5 mg on Thursdays and Sundays and and recheck on Monday.  DEMETRICE 5/1/2023

## 2023-05-02 NOTE — PROGRESS NOTES
Patient notified of INR results and dosing directions. Patient verbally confirmed correct dosing schedule. Patient instructed to repeat INR at below requested timeframe. Patient verbalized understanding and agrees with plan.

## 2023-05-08 ENCOUNTER — LAB ENCOUNTER (OUTPATIENT)
Dept: LAB | Age: 63
End: 2023-05-08
Attending: FAMILY MEDICINE
Payer: MEDICARE

## 2023-05-08 ENCOUNTER — ANTI-COAG VISIT (OUTPATIENT)
Dept: FAMILY MEDICINE CLINIC | Facility: CLINIC | Age: 63
End: 2023-05-08

## 2023-05-08 DIAGNOSIS — I82.432 ACUTE DEEP VEIN THROMBOSIS (DVT) OF POPLITEAL VEIN OF LEFT LOWER EXTREMITY (HCC): ICD-10-CM

## 2023-05-08 LAB
INR BLD: 2.1 (ref 0.85–1.16)
PROTHROMBIN TIME: 23.4 SECONDS (ref 11.6–14.8)

## 2023-05-08 PROCEDURE — 85610 PROTHROMBIN TIME: CPT

## 2023-05-08 PROCEDURE — 36415 COLL VENOUS BLD VENIPUNCTURE: CPT

## 2023-05-08 NOTE — PROGRESS NOTES
On 10 mg most days, 5 mg on Thursdays and Sundays, continue this dose and recheck in 1 week.   DEMETRICE 5/8/2023

## 2023-05-15 ENCOUNTER — ANTI-COAG VISIT (OUTPATIENT)
Dept: FAMILY MEDICINE CLINIC | Facility: CLINIC | Age: 63
End: 2023-05-15

## 2023-05-15 ENCOUNTER — LAB ENCOUNTER (OUTPATIENT)
Dept: LAB | Age: 63
End: 2023-05-15
Attending: FAMILY MEDICINE
Payer: MEDICARE

## 2023-05-15 DIAGNOSIS — I82.432 ACUTE DEEP VEIN THROMBOSIS (DVT) OF POPLITEAL VEIN OF LEFT LOWER EXTREMITY (HCC): ICD-10-CM

## 2023-05-15 LAB
INR BLD: 1.99 (ref 0.85–1.16)
PROTHROMBIN TIME: 22.4 SECONDS (ref 11.6–14.8)

## 2023-05-15 PROCEDURE — 36415 COLL VENOUS BLD VENIPUNCTURE: CPT

## 2023-05-15 PROCEDURE — 85610 PROTHROMBIN TIME: CPT

## 2023-05-15 NOTE — PROGRESS NOTES
On 10 mg most days, 5 mg on Thursdays and Sundays, increase to 10 mg most days and 7.5 on Thursday and Sunday and recheck in 2 week.   DEMETRICE 5/15/2023

## 2023-05-16 ENCOUNTER — TELEPHONE (OUTPATIENT)
Dept: FAMILY MEDICINE CLINIC | Facility: CLINIC | Age: 63
End: 2023-05-16

## 2023-05-16 DIAGNOSIS — I82.432 ACUTE DEEP VEIN THROMBOSIS (DVT) OF POPLITEAL VEIN OF LEFT LOWER EXTREMITY (HCC): ICD-10-CM

## 2023-05-16 RX ORDER — WARFARIN SODIUM 5 MG/1
10 TABLET ORAL DAILY
Qty: 180 TABLET | Refills: 1 | Status: SHIPPED | OUTPATIENT
Start: 2023-05-16

## 2023-05-16 NOTE — TELEPHONE ENCOUNTER
Called and talked to cali If the script says take on nightly the insurance will only allow that dosage so he just got 90 days on 4/20/23 This needs to be written as directed

## 2023-05-17 NOTE — TELEPHONE ENCOUNTER
1. Acute deep vein thrombosis (DVT) of popliteal vein of left lower extremity (HCC)  Overview:  3/31/23 after Covid, Eliquis 5 BID x 3 to 6 months  Orders:  -     Warfarin Sodium; Take 2 tablets (10 mg total) by mouth daily. Take as directed daily. Dispense: 180 tablet; Refill: 1       OK to notify.  Thanks, Dave Rose MD

## 2023-05-30 ENCOUNTER — LAB ENCOUNTER (OUTPATIENT)
Dept: LAB | Age: 63
End: 2023-05-30
Attending: FAMILY MEDICINE
Payer: MEDICARE

## 2023-05-30 ENCOUNTER — ANTI-COAG VISIT (OUTPATIENT)
Dept: FAMILY MEDICINE CLINIC | Facility: CLINIC | Age: 63
End: 2023-05-30

## 2023-05-30 DIAGNOSIS — I82.432 ACUTE DEEP VEIN THROMBOSIS (DVT) OF POPLITEAL VEIN OF LEFT LOWER EXTREMITY (HCC): ICD-10-CM

## 2023-05-30 LAB
INR BLD: 1.52 (ref 0.85–1.16)
PROTHROMBIN TIME: 18.2 SECONDS (ref 11.6–14.8)

## 2023-05-30 PROCEDURE — 36415 COLL VENOUS BLD VENIPUNCTURE: CPT

## 2023-05-30 PROCEDURE — 85610 PROTHROMBIN TIME: CPT

## 2023-05-30 NOTE — PROGRESS NOTES
Patient notified. He confirms he has been taking 7.5 mg on Thursday and Sundays, not 5 mg. Also, he feels he was told to follow up in office and with [address] on July. Please confim.

## 2023-05-30 NOTE — PROGRESS NOTES
On 10 mg most days, 5 mg on Thursdays and Sundays, increase to 10 mg daily and recheck in 2 days.   [facility] 5/30/2023

## 2023-05-31 NOTE — PROGRESS NOTES
COncentrate on the new dose )1 mg daily0 as I did not erase old note.  The directions were correct if the first part was not

## 2023-05-31 NOTE — PROGRESS NOTES
patient called back to make an appointment for see Dr Aimee Clark after getting US of leg to check on status of blood clot.

## 2023-05-31 NOTE — PROGRESS NOTES
Called and talked to patient told him Dr Ry Chau wants him to take 10 mg coumadin daily for the next 2 weeks then recheck his INR. He stated her will do this.

## 2023-06-12 ENCOUNTER — ANTI-COAG VISIT (OUTPATIENT)
Dept: FAMILY MEDICINE CLINIC | Facility: CLINIC | Age: 63
End: 2023-06-12

## 2023-06-12 ENCOUNTER — LAB ENCOUNTER (OUTPATIENT)
Dept: LAB | Age: 63
End: 2023-06-12
Attending: FAMILY MEDICINE
Payer: MEDICARE

## 2023-06-12 DIAGNOSIS — I82.432 ACUTE DEEP VEIN THROMBOSIS (DVT) OF POPLITEAL VEIN OF LEFT LOWER EXTREMITY (HCC): ICD-10-CM

## 2023-06-12 LAB
INR BLD: 2 (ref 0.85–1.16)
PROTHROMBIN TIME: 22.5 SECONDS (ref 11.6–14.8)

## 2023-06-12 PROCEDURE — 36415 COLL VENOUS BLD VENIPUNCTURE: CPT

## 2023-06-12 PROCEDURE — 85610 PROTHROMBIN TIME: CPT

## 2023-06-20 ENCOUNTER — HOSPITAL ENCOUNTER (OUTPATIENT)
Dept: ULTRASOUND IMAGING | Age: 63
Discharge: HOME | End: 2023-06-20
Attending: FAMILY MEDICINE
Payer: MEDICARE

## 2023-06-20 DIAGNOSIS — I82.432 ACUTE DEEP VEIN THROMBOSIS (DVT) OF POPLITEAL VEIN OF LEFT LOWER EXTREMITY (HCC): ICD-10-CM

## 2023-06-20 PROCEDURE — 93971 EXTREMITY STUDY: CPT | Performed by: FAMILY MEDICINE

## 2023-06-26 ENCOUNTER — ANTI-COAG VISIT (OUTPATIENT)
Dept: FAMILY MEDICINE CLINIC | Facility: CLINIC | Age: 63
End: 2023-06-26

## 2023-06-26 ENCOUNTER — LAB ENCOUNTER (OUTPATIENT)
Dept: LAB | Age: 63
End: 2023-06-26
Attending: FAMILY MEDICINE
Payer: MEDICARE

## 2023-06-26 DIAGNOSIS — I82.432 ACUTE DEEP VEIN THROMBOSIS (DVT) OF POPLITEAL VEIN OF LEFT LOWER EXTREMITY (HCC): Primary | ICD-10-CM

## 2023-06-26 DIAGNOSIS — I82.432 ACUTE DEEP VEIN THROMBOSIS (DVT) OF POPLITEAL VEIN OF LEFT LOWER EXTREMITY (HCC): ICD-10-CM

## 2023-06-26 LAB
INR BLD: 2.51 (ref 0.85–1.16)
PROTHROMBIN TIME: 26.8 SECONDS (ref 11.6–14.8)

## 2023-06-26 PROCEDURE — 36415 COLL VENOUS BLD VENIPUNCTURE: CPT

## 2023-06-26 PROCEDURE — 85610 PROTHROMBIN TIME: CPT

## 2023-06-28 ENCOUNTER — OFFICE VISIT (OUTPATIENT)
Dept: FAMILY MEDICINE CLINIC | Facility: CLINIC | Age: 63
End: 2023-06-28
Payer: MEDICARE

## 2023-06-28 VITALS
RESPIRATION RATE: 16 BRPM | SYSTOLIC BLOOD PRESSURE: 132 MMHG | TEMPERATURE: 97 F | WEIGHT: 222.63 LBS | BODY MASS INDEX: 34.94 KG/M2 | HEIGHT: 67 IN | HEART RATE: 78 BPM | DIASTOLIC BLOOD PRESSURE: 80 MMHG

## 2023-06-28 DIAGNOSIS — E78.2 MIXED HYPERLIPIDEMIA: ICD-10-CM

## 2023-06-28 DIAGNOSIS — R73.03 PREDIABETES: ICD-10-CM

## 2023-06-28 DIAGNOSIS — I10 ESSENTIAL HYPERTENSION: Primary | ICD-10-CM

## 2023-06-28 DIAGNOSIS — I82.432 ACUTE DEEP VEIN THROMBOSIS (DVT) OF POPLITEAL VEIN OF LEFT LOWER EXTREMITY (HCC): ICD-10-CM

## 2023-06-28 PROCEDURE — 99214 OFFICE O/P EST MOD 30 MIN: CPT | Performed by: FAMILY MEDICINE

## 2023-07-10 ENCOUNTER — TELEPHONE (OUTPATIENT)
Dept: FAMILY MEDICINE CLINIC | Facility: CLINIC | Age: 63
End: 2023-07-10

## 2023-07-10 NOTE — TELEPHONE ENCOUNTER
Donato, I mis remembered this. He should continue warfarin, no eliquis and should continue INR at 2-4 week intervals depending on results.

## 2023-07-10 NOTE — TELEPHONE ENCOUNTER
Pt is calling because he wants to know if Dr William Whatley wants him to continue getting labs done every 2 weeks for his blood clot

## 2023-07-10 NOTE — TELEPHONE ENCOUNTER
Patient reports he is still taking Warfarin. Stopped Eliquis. Last INR:   Component      Latest Ref Rng 6/26/2023   PT      11.6 - 14.8 seconds 26.8 (H)    INR      0.85 - 1.16  2.51 (H)       Legend:  (H) High    Routed to Dr. Tovar Sheets - pt asking if he can go to monthly INR checks since last several INRs have been in range?

## 2023-07-10 NOTE — TELEPHONE ENCOUNTER
LOV 6/28/23 \" Acute deep vein thrombosis (DVT) of popliteal vein of left lower extremity ([facility] [address]  Overview:  3/31/23 after Covid, Eliquis 5 initially but switched to warfarin due to interaction with Carbamezapine. Assessment & Plan:  Stable finally. Continue present management continue through 6 months- reassess in 10/2023\"    Reviewed above info with patient. He reports he used to be on Eliquis. He is asking if he still needs his INR checked every 2 weeks. Reviewed last INR from 6/26/23. Patient would be due today. Please advise.

## 2023-07-24 ENCOUNTER — ANTI-COAG VISIT (OUTPATIENT)
Dept: FAMILY MEDICINE CLINIC | Facility: CLINIC | Age: 63
End: 2023-07-24

## 2023-07-24 ENCOUNTER — LAB ENCOUNTER (OUTPATIENT)
Dept: LAB | Age: 63
End: 2023-07-24
Attending: FAMILY MEDICINE
Payer: COMMERCIAL

## 2023-07-24 DIAGNOSIS — I82.432 ACUTE DEEP VEIN THROMBOSIS (DVT) OF POPLITEAL VEIN OF LEFT LOWER EXTREMITY (HCC): ICD-10-CM

## 2023-07-24 DIAGNOSIS — I82.432 ACUTE DEEP VEIN THROMBOSIS (DVT) OF POPLITEAL VEIN OF LEFT LOWER EXTREMITY (HCC): Primary | ICD-10-CM

## 2023-07-24 LAB
INR BLD: 1.79 (ref 0.85–1.16)
PROTHROMBIN TIME: 20.7 SECONDS (ref 11.6–14.8)

## 2023-07-24 PROCEDURE — 85610 PROTHROMBIN TIME: CPT

## 2023-07-25 NOTE — PROGRESS NOTES
Called and talked to patient discussed new dosage 10 mg alternating with 12.5 mg for the next 7 days then repeat the test.

## 2023-07-28 ENCOUNTER — TELEPHONE (OUTPATIENT)
Dept: FAMILY MEDICINE CLINIC | Facility: CLINIC | Age: 63
End: 2023-07-28

## 2023-07-28 NOTE — TELEPHONE ENCOUNTER
Received Per op , patient needs warfarin clearance from Dr. Danette Yousif for colonoscopy with Peg prep at [address] [place] [address] with Dr. Snow Holliday.  Placed in Triage for review and if appointment is needed

## 2023-08-01 ENCOUNTER — LAB ENCOUNTER (OUTPATIENT)
Dept: LAB | Age: 63
End: 2023-08-01
Attending: FAMILY MEDICINE
Payer: MEDICARE

## 2023-08-01 ENCOUNTER — ANTI-COAG VISIT (OUTPATIENT)
Dept: FAMILY MEDICINE CLINIC | Facility: CLINIC | Age: 63
End: 2023-08-01

## 2023-08-01 DIAGNOSIS — I82.432 ACUTE DEEP VEIN THROMBOSIS (DVT) OF POPLITEAL VEIN OF LEFT LOWER EXTREMITY (HCC): Primary | ICD-10-CM

## 2023-08-01 DIAGNOSIS — R74.8 ELEVATED ALKALINE PHOSPHATASE LEVEL: ICD-10-CM

## 2023-08-01 DIAGNOSIS — I82.432 ACUTE DEEP VEIN THROMBOSIS (DVT) OF POPLITEAL VEIN OF LEFT LOWER EXTREMITY (HCC): ICD-10-CM

## 2023-08-01 LAB
A1AT SERPL-MCNC: 147 MG/DL (ref 90–200)
ALBUMIN SERPL-MCNC: 4 G/DL (ref 3.4–5)
ALBUMIN/GLOB SERPL: 1.3 {RATIO} (ref 1–2)
ALP LIVER SERPL-CCNC: 97 U/L
ALT SERPL-CCNC: 27 U/L
ANION GAP SERPL CALC-SCNC: 7 MMOL/L (ref 0–18)
AST SERPL-CCNC: 25 U/L (ref 15–37)
BILIRUB SERPL-MCNC: 0.5 MG/DL (ref 0.1–2)
BUN BLD-MCNC: 21 MG/DL (ref 7–18)
CALCIUM BLD-MCNC: 9.6 MG/DL (ref 8.5–10.1)
CHLORIDE SERPL-SCNC: 107 MMOL/L (ref 98–112)
CO2 SERPL-SCNC: 25 MMOL/L (ref 21–32)
CREAT BLD-MCNC: 0.9 MG/DL
DEPRECATED HBV CORE AB SER IA-ACNC: 143 NG/ML
EGFRCR SERPLBLD CKD-EPI 2021: 96 ML/MIN/1.73M2 (ref 60–?)
FASTING STATUS PATIENT QL REPORTED: YES
GLOBULIN PLAS-MCNC: 3.2 G/DL (ref 2.8–4.4)
GLUCOSE BLD-MCNC: 91 MG/DL (ref 70–99)
HAV AB SER QL IA: NONREACTIVE
HBV CORE AB SERPL QL IA: NONREACTIVE
HBV SURFACE AB SER QL: NONREACTIVE
HBV SURFACE AB SERPL IA-ACNC: <3.1 MIU/ML
HBV SURFACE AG SER-ACNC: <0.1 [IU]/L
HBV SURFACE AG SERPL QL IA: NONREACTIVE
HCV AB SERPL QL IA: NONREACTIVE
IGA SERPL-MCNC: 255 MG/DL (ref 70–312)
INR BLD: 2.05 (ref 0.85–1.16)
IRON SATN MFR SERPL: 64 %
IRON SERPL-MCNC: 151 UG/DL
OSMOLALITY SERPL CALC.SUM OF ELEC: 291 MOSM/KG (ref 275–295)
POTASSIUM SERPL-SCNC: 3.8 MMOL/L (ref 3.5–5.1)
PROT SERPL-MCNC: 7.2 G/DL (ref 6.4–8.2)
PROTHROMBIN TIME: 22.9 SECONDS (ref 11.6–14.8)
SODIUM SERPL-SCNC: 139 MMOL/L (ref 136–145)
TIBC SERPL-MCNC: 237 UG/DL (ref 240–450)
TRANSFERRIN SERPL-MCNC: 159 MG/DL (ref 200–360)

## 2023-08-01 PROCEDURE — 86706 HEP B SURFACE ANTIBODY: CPT

## 2023-08-01 PROCEDURE — 82784 ASSAY IGA/IGD/IGG/IGM EACH: CPT

## 2023-08-01 PROCEDURE — 86038 ANTINUCLEAR ANTIBODIES: CPT

## 2023-08-01 PROCEDURE — 84080 ASSAY ALKALINE PHOSPHATASES: CPT

## 2023-08-01 PROCEDURE — 82728 ASSAY OF FERRITIN: CPT

## 2023-08-01 PROCEDURE — 84075 ASSAY ALKALINE PHOSPHATASE: CPT

## 2023-08-01 PROCEDURE — 86708 HEPATITIS A ANTIBODY: CPT

## 2023-08-01 PROCEDURE — 82103 ALPHA-1-ANTITRYPSIN TOTAL: CPT

## 2023-08-01 PROCEDURE — 85610 PROTHROMBIN TIME: CPT

## 2023-08-01 PROCEDURE — 83550 IRON BINDING TEST: CPT

## 2023-08-01 PROCEDURE — 86803 HEPATITIS C AB TEST: CPT

## 2023-08-01 PROCEDURE — 86364 TISS TRNSGLTMNASE EA IG CLAS: CPT

## 2023-08-01 PROCEDURE — 83516 IMMUNOASSAY NONANTIBODY: CPT

## 2023-08-01 PROCEDURE — 87340 HEPATITIS B SURFACE AG IA: CPT

## 2023-08-01 PROCEDURE — 83540 ASSAY OF IRON: CPT

## 2023-08-01 PROCEDURE — 80053 COMPREHEN METABOLIC PANEL: CPT

## 2023-08-01 PROCEDURE — 36415 COLL VENOUS BLD VENIPUNCTURE: CPT

## 2023-08-01 PROCEDURE — 86704 HEP B CORE ANTIBODY TOTAL: CPT

## 2023-08-02 LAB
ACTIN SMOOTH MUSCLE AB: 3 UNITS
ALK PHOSPHATASE: 96 IU/L
BONE FRAC: 46 %
INTESTINAL FRAC: 0 %
LIVER FRAC: 54 %
M2 MITOCHONDRIAL AB: <20 UNITS
TTG IGA SER-ACNC: 0.6 U/ML (ref ?–7)

## 2023-08-03 LAB — NUCLEAR IGG TITR SER IF: NEGATIVE {TITER}

## 2023-08-15 ENCOUNTER — LAB ENCOUNTER (OUTPATIENT)
Dept: LAB | Age: 63
End: 2023-08-15
Attending: FAMILY MEDICINE
Payer: MEDICARE

## 2023-08-15 ENCOUNTER — ANTI-COAG VISIT (OUTPATIENT)
Dept: FAMILY MEDICINE CLINIC | Facility: CLINIC | Age: 63
End: 2023-08-15

## 2023-08-15 DIAGNOSIS — I82.432 ACUTE DEEP VEIN THROMBOSIS (DVT) OF POPLITEAL VEIN OF LEFT LOWER EXTREMITY (HCC): ICD-10-CM

## 2023-08-15 DIAGNOSIS — I82.432 ACUTE DEEP VEIN THROMBOSIS (DVT) OF POPLITEAL VEIN OF LEFT LOWER EXTREMITY (HCC): Primary | ICD-10-CM

## 2023-08-15 LAB
INR BLD: 2.07 (ref 0.85–1.16)
PROTHROMBIN TIME: 23.1 SECONDS (ref 11.6–14.8)

## 2023-08-15 PROCEDURE — 85610 PROTHROMBIN TIME: CPT

## 2023-08-15 PROCEDURE — 36415 COLL VENOUS BLD VENIPUNCTURE: CPT

## 2023-08-25 ENCOUNTER — HOSPITAL ENCOUNTER (OUTPATIENT)
Dept: ULTRASOUND IMAGING | Age: 63
Discharge: HOME | End: 2023-08-25
Attending: FAMILY MEDICINE
Payer: MEDICARE

## 2023-08-25 DIAGNOSIS — R74.8 ELEVATED ALKALINE PHOSPHATASE LEVEL: ICD-10-CM

## 2023-08-25 PROCEDURE — 76700 US EXAM ABDOM COMPLETE: CPT | Performed by: FAMILY MEDICINE

## 2023-08-29 ENCOUNTER — ANTI-COAG VISIT (OUTPATIENT)
Dept: FAMILY MEDICINE CLINIC | Facility: CLINIC | Age: 63
End: 2023-08-29

## 2023-08-29 ENCOUNTER — LAB ENCOUNTER (OUTPATIENT)
Dept: LAB | Age: 63
End: 2023-08-29
Attending: FAMILY MEDICINE
Payer: MEDICARE

## 2023-08-29 DIAGNOSIS — I82.502 CHRONIC DEEP VEIN THROMBOSIS (DVT) OF LEFT LOWER EXTREMITY, UNSPECIFIED VEIN (HCC): ICD-10-CM

## 2023-08-29 DIAGNOSIS — I82.432 ACUTE DEEP VEIN THROMBOSIS (DVT) OF POPLITEAL VEIN OF LEFT LOWER EXTREMITY (HCC): Primary | ICD-10-CM

## 2023-08-29 DIAGNOSIS — Z86.718 HISTORY OF DVT (DEEP VEIN THROMBOSIS): ICD-10-CM

## 2023-08-29 DIAGNOSIS — I82.432 ACUTE DEEP VEIN THROMBOSIS (DVT) OF POPLITEAL VEIN OF LEFT LOWER EXTREMITY (HCC): ICD-10-CM

## 2023-08-29 LAB
INR BLD: 1.78 (ref 0.85–1.16)
PROTHROMBIN TIME: 20.6 SECONDS (ref 11.6–14.8)

## 2023-08-29 PROCEDURE — 85610 PROTHROMBIN TIME: CPT

## 2023-08-29 PROCEDURE — 36415 COLL VENOUS BLD VENIPUNCTURE: CPT

## 2023-09-05 ENCOUNTER — LAB ENCOUNTER (OUTPATIENT)
Dept: LAB | Age: 63
End: 2023-09-05
Attending: FAMILY MEDICINE
Payer: MEDICARE

## 2023-09-05 ENCOUNTER — ANTI-COAG VISIT (OUTPATIENT)
Dept: FAMILY MEDICINE CLINIC | Facility: CLINIC | Age: 63
End: 2023-09-05

## 2023-09-05 DIAGNOSIS — I82.432 ACUTE DEEP VEIN THROMBOSIS (DVT) OF POPLITEAL VEIN OF LEFT LOWER EXTREMITY (HCC): Primary | ICD-10-CM

## 2023-09-05 DIAGNOSIS — I82.432 ACUTE DEEP VEIN THROMBOSIS (DVT) OF POPLITEAL VEIN OF LEFT LOWER EXTREMITY (HCC): ICD-10-CM

## 2023-09-05 LAB
INR BLD: 2.3 (ref 0.85–1.16)
PROTHROMBIN TIME: 25 SECONDS (ref 11.6–14.8)

## 2023-09-05 PROCEDURE — 36415 COLL VENOUS BLD VENIPUNCTURE: CPT

## 2023-09-05 PROCEDURE — 85610 PROTHROMBIN TIME: CPT

## 2023-09-05 NOTE — PROGRESS NOTES
On 12.5 most days and 10 on Thursday and Sunday, continue  and recheck in 1 weeks.   EDITHG 9/5/2023

## 2023-09-12 ENCOUNTER — ANTI-COAG VISIT (OUTPATIENT)
Dept: FAMILY MEDICINE CLINIC | Facility: CLINIC | Age: 63
End: 2023-09-12

## 2023-09-12 ENCOUNTER — LAB ENCOUNTER (OUTPATIENT)
Dept: LAB | Age: 63
End: 2023-09-12
Attending: FAMILY MEDICINE
Payer: MEDICARE

## 2023-09-12 ENCOUNTER — HOSPITAL ENCOUNTER (OUTPATIENT)
Dept: ULTRASOUND IMAGING | Age: 63
Discharge: HOME | End: 2023-09-12
Attending: FAMILY MEDICINE
Payer: MEDICARE

## 2023-09-12 DIAGNOSIS — I82.432 ACUTE DEEP VEIN THROMBOSIS (DVT) OF POPLITEAL VEIN OF LEFT LOWER EXTREMITY (HCC): Primary | ICD-10-CM

## 2023-09-12 DIAGNOSIS — I82.432 ACUTE DEEP VEIN THROMBOSIS (DVT) OF POPLITEAL VEIN OF LEFT LOWER EXTREMITY (HCC): ICD-10-CM

## 2023-09-12 DIAGNOSIS — I82.502 CHRONIC DEEP VEIN THROMBOSIS (DVT) OF LEFT LOWER EXTREMITY, UNSPECIFIED VEIN (HCC): ICD-10-CM

## 2023-09-12 DIAGNOSIS — Z86.718 HISTORY OF DVT (DEEP VEIN THROMBOSIS): ICD-10-CM

## 2023-09-12 LAB
INR BLD: 2.04 (ref 0.85–1.16)
PROTHROMBIN TIME: 22.8 SECONDS (ref 11.6–14.8)

## 2023-09-12 PROCEDURE — 93971 EXTREMITY STUDY: CPT | Performed by: FAMILY MEDICINE

## 2023-09-12 PROCEDURE — 36415 COLL VENOUS BLD VENIPUNCTURE: CPT

## 2023-09-12 PROCEDURE — 85610 PROTHROMBIN TIME: CPT

## 2023-10-02 ENCOUNTER — TELEPHONE (OUTPATIENT)
Dept: FAMILY MEDICINE CLINIC | Facility: CLINIC | Age: 63
End: 2023-10-02

## 2023-10-02 DIAGNOSIS — E78.5 DYSLIPIDEMIA: ICD-10-CM

## 2023-10-02 DIAGNOSIS — Z13.6 SCREENING FOR CARDIOVASCULAR CONDITION: Primary | ICD-10-CM

## 2023-10-02 DIAGNOSIS — Z12.5 SCREENING FOR PROSTATE CANCER: ICD-10-CM

## 2023-10-02 DIAGNOSIS — R73.03 PREDIABETES: ICD-10-CM

## 2023-10-02 NOTE — TELEPHONE ENCOUNTER
Please enter lab orders for the patient's upcoming physical appointment.     Physical scheduled:   Your appointments       Date & Time Appointment Department ([place])    Oct 12, 2023 10:00 AM CDT Medicare Annual Well Visit with Jose Morales MD [facility] ([facility])              [facility]  [address] [place] [id number]  [phone]           Preferred lab: [facility] ([facility])     The patient has been notified to complete fasting labs prior to their physical appointment.

## 2023-10-09 ENCOUNTER — LAB ENCOUNTER (OUTPATIENT)
Dept: LAB | Age: 63
End: 2023-10-09
Attending: PHYSICIAN ASSISTANT
Payer: MEDICARE

## 2023-10-09 DIAGNOSIS — Z12.5 SCREENING FOR PROSTATE CANCER: ICD-10-CM

## 2023-10-09 DIAGNOSIS — Z13.6 SCREENING FOR CARDIOVASCULAR CONDITION: ICD-10-CM

## 2023-10-09 DIAGNOSIS — I82.432 ACUTE DEEP VEIN THROMBOSIS (DVT) OF POPLITEAL VEIN OF LEFT LOWER EXTREMITY (HCC): ICD-10-CM

## 2023-10-09 DIAGNOSIS — E78.5 DYSLIPIDEMIA: ICD-10-CM

## 2023-10-09 DIAGNOSIS — R73.03 PREDIABETES: ICD-10-CM

## 2023-10-09 LAB
CHOLEST SERPL-MCNC: 238 MG/DL (ref ?–200)
COMPLEXED PSA SERPL-MCNC: 1.64 NG/ML (ref ?–4)
EST. AVERAGE GLUCOSE BLD GHB EST-MCNC: 108 MG/DL (ref 68–126)
FASTING PATIENT LIPID ANSWER: YES
HBA1C MFR BLD: 5.4 % (ref ?–5.7)
HDLC SERPL-MCNC: 68 MG/DL (ref 40–59)
INR BLD: 2.99 (ref 0.85–1.16)
LDLC SERPL CALC-MCNC: 149 MG/DL (ref ?–100)
NONHDLC SERPL-MCNC: 170 MG/DL (ref ?–130)
PROTHROMBIN TIME: 31.5 SECONDS (ref 11.6–14.8)
TRIGL SERPL-MCNC: 121 MG/DL (ref 30–149)
VLDLC SERPL CALC-MCNC: 23 MG/DL (ref 0–30)

## 2023-10-09 PROCEDURE — 80061 LIPID PANEL: CPT

## 2023-10-09 PROCEDURE — 36415 COLL VENOUS BLD VENIPUNCTURE: CPT

## 2023-10-09 PROCEDURE — 85610 PROTHROMBIN TIME: CPT

## 2023-10-09 PROCEDURE — 83036 HEMOGLOBIN GLYCOSYLATED A1C: CPT

## 2023-10-10 ENCOUNTER — ANTI-COAG VISIT (OUTPATIENT)
Dept: FAMILY MEDICINE CLINIC | Facility: CLINIC | Age: 63
End: 2023-10-10

## 2023-10-10 DIAGNOSIS — I82.432 ACUTE DEEP VEIN THROMBOSIS (DVT) OF POPLITEAL VEIN OF LEFT LOWER EXTREMITY (HCC): Primary | ICD-10-CM

## 2023-10-11 NOTE — PROGRESS NOTES
Patient called back and I gave him the results and recommendations. He is asking if Dr Danette Yousif is at a point he would like to stop the coumadin as this was discussed when he started this.

## 2023-10-11 NOTE — PROGRESS NOTES
On 12.5 most days and 10 on Thursday and Sunday, continue  and recheck in 4 weeks.   DEMETRICE 10/10/2023

## 2023-10-12 ENCOUNTER — OFFICE VISIT (OUTPATIENT)
Dept: FAMILY MEDICINE CLINIC | Facility: CLINIC | Age: 63
End: 2023-10-12
Payer: MEDICARE

## 2023-10-12 VITALS
HEART RATE: 68 BPM | WEIGHT: 221.63 LBS | BODY MASS INDEX: 34.79 KG/M2 | RESPIRATION RATE: 14 BRPM | SYSTOLIC BLOOD PRESSURE: 128 MMHG | DIASTOLIC BLOOD PRESSURE: 70 MMHG | HEIGHT: 67 IN

## 2023-10-12 DIAGNOSIS — Z00.00 ENCOUNTER FOR ANNUAL HEALTH EXAMINATION: ICD-10-CM

## 2023-10-12 DIAGNOSIS — Z00.00 ANNUAL PHYSICAL EXAM: Primary | ICD-10-CM

## 2023-10-12 DIAGNOSIS — I10 ESSENTIAL HYPERTENSION: ICD-10-CM

## 2023-10-12 DIAGNOSIS — I82.432 ACUTE DEEP VEIN THROMBOSIS (DVT) OF POPLITEAL VEIN OF LEFT LOWER EXTREMITY (HCC): ICD-10-CM

## 2023-10-12 DIAGNOSIS — R73.03 PREDIABETES: ICD-10-CM

## 2023-10-12 DIAGNOSIS — E03.8 SUBCLINICAL HYPOTHYROIDISM: ICD-10-CM

## 2023-10-12 DIAGNOSIS — R97.20 ELEVATED PSA, LESS THAN 10 NG/ML: ICD-10-CM

## 2023-10-12 DIAGNOSIS — E78.2 MIXED HYPERLIPIDEMIA: ICD-10-CM

## 2023-10-12 PROCEDURE — 99214 OFFICE O/P EST MOD 30 MIN: CPT | Performed by: FAMILY MEDICINE

## 2023-10-12 PROCEDURE — G0439 PPPS, SUBSEQ VISIT: HCPCS | Performed by: FAMILY MEDICINE

## 2023-10-12 NOTE — ASSESSMENT & PLAN NOTE
Last A1c value was 5.4% done 10/9/2023. Sugar control is well controlled, no significant medication side effects noted, Pre-Diabetic. Not currently on Diabetic meds.

## 2023-10-12 NOTE — ASSESSMENT & PLAN NOTE
BP shows good control with last BP of 112/77. Continue lifestyle changes, diet, exercise and weight loss. Last K was 3.8 done on 8/1/2023. Last Cr was 0.9 done on 8/1/2023. Hypertension meds include lisinopril 10 MG Oral Tab [[id number]].

## 2023-10-12 NOTE — ASSESSMENT & PLAN NOTE
Cholesterol shows Good control. Long term heart-healthy diet and lifestyle discussed and encouraged to reduce risk of cardiovascular disease. Cholesterol: 238, done on 10/9/2023. HDL Cholesterol: 68, done on 10/9/2023. TriGlycerides 121, done on 10/9/2023. LDL Cholesterol: 149, done on 10/9/2023. No current Cholesterol medication.

## 2023-10-12 NOTE — ASSESSMENT & PLAN NOTE
6/4/2020: PSA 1.69  10/9/2023: Prostate Specific Antigen Screen 1.64 stable, continue present management

## 2023-10-12 NOTE — PATIENT INSTRUCTIONS
Gregorio Abraham Jr.'s SCREENING SCHEDULE   Tests on this list are recommended by your physician but may not be covered, or covered at this frequency, by your insurer. Please check with your insurance carrier before scheduling to verify coverage.    PREVENTATIVE SERVICES FREQUENCY &  COVERAGE DETAILS LAST COMPLETION DATE   Diabetes Screening    Fasting Blood Sugar / Glucose    One screening every 12 months if never tested or if previously tested but not diagnosed with pre-diabetes   One screening every 6 months if diagnosed with pre-diabetes Lab Results   Component Value Date    GLU 91 08/01/2023        Cardiovascular Disease Screening    Lipid Panel  Cholesterol  Lipoprotein (HDL)  Triglycerides Covered every 5 years for all Medicare beneficiaries without apparent signs or symptoms of cardiovascular disease Lab Results   Component Value Date    CHOLEST 238 (H) 10/09/2023    HDL 68 (H) 10/09/2023     (H) 10/09/2023    TRIG 121 10/09/2023         Electrocardiogram (EKG)   Covered if needed at Welcome to Medicare, and non-screening if indicated for medical reasons 06/12/2020      Ultrasound Screening for Abdominal Aortic Aneurysm (AAA) Covered once in a lifetime for one of the following risk factors    Men who are 73-68 years old and have ever smoked    Anyone with a family history -     Colorectal Cancer Screening  Covered for ages 52-80; only need ONE of the following:    Colonoscopy   Covered every 10 years    Covered every 2 years if patient is at high risk or previous colonoscopy was abnormal 11/03/2020    Colorectal Cancer Screening due on 11/03/2023    Flexible Sigmoidoscopy   Covered every 4 years -    Fecal Occult Blood Test Covered annually -   Prostate Cancer Screening    Prostate-Specific Antigen (PSA) Annually Lab Results   Component Value Date    PSA 1.69 06/04/2020     PSA due on 10/09/2024   Immunizations    Influenza Covered once per flu season  Please get every year 11/29/2022  Influenza Vaccine(1) due on 10/01/2023    Pneumococcal Each vaccine (Ezcvosy55 & [id number]) covered once after 72 Prevnar 13: -    [id number]: -     No recommendations at this time    Hepatitis B One screening covered for patients with certain risk factors   -  No recommendations at this time    Tetanus Toxoid Not covered by Medicare Part B unless medically necessary (cut with metal); may be covered with your pharmacy prescription benefits 05/03/2008    Tetanus, Diptheria and Pertusis TD and TDaP Not covered by Medicare Part B -  No recommendations at this time    Zoster Not covered by Medicare Part B; may be covered with your pharmacy  prescription benefits -  Zoster Vaccines(1 of 2) Never done     Annual Monitoring of Persistent Medications (ACE/ARB, digoxin diuretics, anticonvulsants)    Potassium Annually Lab Results   Component Value Date    K 3.8 08/01/2023         Creatinine   Annually Lab Results   Component Value Date    CREATSERUM 0.90 08/01/2023         BUN Annually Lab Results   Component Value Date    BUN 21 (H) 08/01/2023       Drug Serum Conc Annually No results found for: \"DIGOXIN\", \"DIG\", \"VALP\"

## 2023-10-23 ENCOUNTER — OFFICE VISIT (OUTPATIENT)
Dept: NEUROLOGY | Facility: CLINIC | Age: 63
End: 2023-10-23
Payer: MEDICARE

## 2023-10-23 VITALS
SYSTOLIC BLOOD PRESSURE: 124 MMHG | RESPIRATION RATE: 16 BRPM | WEIGHT: 225.63 LBS | BODY MASS INDEX: 35 KG/M2 | HEART RATE: 78 BPM | DIASTOLIC BLOOD PRESSURE: 72 MMHG

## 2023-10-23 DIAGNOSIS — I82.402 ACUTE DEEP VEIN THROMBOSIS (DVT) OF LEFT LOWER EXTREMITY, UNSPECIFIED VEIN (HCC): ICD-10-CM

## 2023-10-23 DIAGNOSIS — Z79.899 ON CARBAMAZEPINE THERAPY: ICD-10-CM

## 2023-10-23 DIAGNOSIS — G50.0 RIGHT TRIGEMINAL NEURALGIA: Primary | ICD-10-CM

## 2023-10-23 PROCEDURE — 99213 OFFICE O/P EST LOW 20 MIN: CPT | Performed by: OTHER

## 2023-10-23 RX ORDER — CARBAMAZEPINE 200 MG/1
TABLET ORAL
Qty: 270 TABLET | Refills: 3 | Status: SHIPPED | OUTPATIENT
Start: 2023-10-23

## 2023-10-23 NOTE — PROGRESS NOTES
[place]    Neurology    Garth Mari. Patient Status:  No patient class for patient encounter    1960 MRN [id number]   Location [facility] PCP Adela Tomlinson MD              HPI:   Garth Mari. is a(n) 61year old male who presents at the request of Dr. Goran Gibbons for evaluation of facial pain. It started 4-5 years ago. Spontaneous sharp brief pain for a few seconds, then again 7 months later. In the right lower jaw region. Another year went by, with the brief pain again. Started having increased sensitivity in the right lower jaw. In 2022 had a teeth cleaning, and within 1-2 days, developed an intermittent pulsing dull pain in that area. Then 3 weeks ago in 2022 while eating lunch, he developed sensitivity to food on his right lower molar. Persisted and tooth was sensitive to any mechanical pressure. Following morning woke up with 15/10 sharp pain in that one molar. Steady for 2 minutes, would subside 20 seconds, then pulse on and off. Subsided, but was still very sensitive. Any time he would touch the tooth, it would trigger the severe pain. Has pain with chewing, talking, and brushing. Saw a few dental specialists. At first thought it was traumatic occlusion/having to do with tooth height. Had tooth ground down, but symptoms persisted. Then had root canal done to remove the nerve leading to the nerve. No improvement. Also saw oral surgeon. Also had a block at the angle of the jaw, likely facial nerve, which did not improve his symptoms. Only had relief with anesthetic given to gum beneath the tooth. Had a panoramic CT scan which showed no structural lesions. Now has constant dull pain. With mechanical pressure, it is severe pain that lasts 2-3 minutes. Interim  Pt states is doing better. Pt states does have concerns about side effects of medication Carbamazepine. Would like to go back down on medication.  Initially 200mg BID was helpful for the right lower facial pain, but then still noted intermittent sensitivity. I recommended increase to 300mg BID, which he did, and it helped significantly. He had covid and a LLE DVT, and was started on apixaban. Interim  Facial pain is unchanged. Only occasionally gets a right facial twinge. Taking Calcium and Vitamin D. Pertinent imaging and laboratory work-up:   8/2023- AST/ALT wnl, Cr 0.90  MRI/MRA head  MRA BRAIN   The upper cervical, petrous, cavernous, and supraclinoid internal carotid arteries are unremarkable. An anterior communicating artery is seen. The branches of the anterior cerebral and middle cerebral arteries are unremarkable. A small bilateral posterior communicating arteries are seen. The branches of the posterior cerebral and superior cerebellar arteries are unremarkable. The basilar artery has a normal course and caliber. The bilateral vertebral arteries are unremarkable. Impression   CONCLUSION:  Overall unremarkable MRI trigeminal protocol. Component      Latest Ref Rng 1/26/2023 4/7/2023   CREATININE      0.70 - 1.30 mg/dL 0.95  0.90    CALCIUM      8.5 - 10.1 mg/dL 9.9  9.6    CALCULATED OSMOLALITY      275 - 295 mOsm/kg 287  286    eGFR-Cr      >=60 mL/min/1.73m2 90  97    AST (SGOT)      15 - 37 U/L 24  27    ALT (SGPT)      16 - 61 U/L 26  40    ALKALINE PHOSPHATASE      45 - 117 U/L 120 (H)  133 (H)    Total Bilirubin      0.1 - 2.0 mg/dL 0.4  0.4    PROTEIN, TOTAL      6.4 - 8.2 g/dL 7.1  7.2    Albumin      3.4 - 5.0 g/dL 3.6  3.5    Globulin      2.8 - 4.4 g/dL 3.5  3.7    A/G Ratio      1.0 - 2.0  1.0  0.9 (L)    Patient Fasting for CMP?  Yes  Yes           Past Medical History:  Past Medical History:   Diagnosis Date    Arthritis     Back pain     COVID 03/13/2023    Essential hypertension     Plantar fasciitis     Rotator cuff syndrome, left 11/30/2015    Wears glasses         Past Surgical History:  Past Surgical History:   Procedure Laterality Date    COLONOSCOPY      COLONOSCOPY WITH BIOPSY  7/19/2011    CYSTOSCOPY,INSERT URETERAL STENT         Family History:  family history includes Asthma in his mother; Diabetes in his mother; Heart Attack in his maternal grandmother and paternal grandfather; Hypertension in his mother; Thyroid Disorder in his mother; cushing's syndrome in his sister; leon gehrig's disease in his maternal grandmother. Social History:   reports that he has never smoked. He has never used smokeless tobacco. He reports that he does not currently use alcohol. He reports that he does not use drugs. Allergies:  No Known Allergies    MEDICATIONS:    Current Outpatient Medications:     PEG 3350-KCl-NaBcb-NaCl-NaSulf (PEG 3350/ELECTROLYTES) 240 g Oral Recon Soln, Take as directed by Doctor, Disp: 4000 mL, Rfl: 0    warfarin 5 MG Oral Tab, Take 2 tablets (10 mg total) by mouth daily. Take as directed daily. , Disp: 180 tablet, Rfl: 1    carBAMazepine 200 MG Oral Tab, TAKE 1& 1/2 TABLETS TWICE DAILY, Disp: 270 tablet, Rfl: 3    lisinopril 10 MG Oral Tab, Take 1 tablet (10 mg total) by mouth daily. , Disp: 90 tablet, Rfl: 3    famotidine 40 MG Oral Tab, Take 1 tablet (40 mg total) by mouth daily. , Disp: 90 tablet, Rfl: 3    ibuprofen 600 MG Oral Tab, Take 1 tablet (600 mg total) by mouth every 8 (eight) hours as needed for Pain., Disp: 90 tablet, Rfl: 3    Calcium Carbonate-Vitamin D 600-400 MG-UNIT Oral Tab, Take 1 tablet by mouth daily. , Disp: , Rfl:     Cholecalciferol (VITAMIN D) 1000 UNITS Oral Cap, Take 2,000 Int'l Units/day by mouth daily. , Disp: , Rfl:       Review of Systems:   A comprehensive 10 point review of systems was completed. Pertinent positives and negatives noted in the HPI.          PHYSICAL EXAM:   Neurologic Exam  Vitals   10/23/23  1251   BP: 124/72   Pulse: 78   Resp: 16     General Appearance: Patient is a 61year old male in no acute distress  Cardiac: Normal rate & regular rhythm  Skin: There are no rashes or other skin lesions. Musculoskeletal: There is no scoliosis, or joint deformities  Neurologic examination:  Mental status: Patient is alert, attentive, and oriented x 3. Language is coherent and fluent without aphasia. Memory, comprehension and ability to follow commands were intact. Cranial nerves II-XII: Optic discs were sharp. Pupils were round and reacted to light. Extraocular movements were full. There was no face, jaw, palate or tongue weakness or atrophy. Facial sensation was normal. Hearing was grossly intact. Shoulder shrug was normal.   Motor exam revealed normal muscle bulk and tone. No atrophy or fasciculations. Manual muscle testing revealed MRC grade 5/5 strength throughout including proximal and distal muscles of the arms and legs. Deep tendon reflexes were 2 at the biceps, brachioradialis, triceps, knee jerk, and ankle jerk. Plantar responses were flexor bilaterally. Sensory exam revealed normal light touch perception. Vibratory perception and proprioception were intact at the toes. Pinprick and temperature were normal. Romberg sign was absent. Complex motor skills revealed normal coordination. Finger-nose-finger intact. Gait was narrow and stable, was able to walk on heels, toes and tandem without any difficulty. ASSESSMENT/ACTIVE PROBLEM LIST:   Right trigeminal neuralgia  (primary encounter diagnosis)  Acute deep vein thrombosis (dvt) of left lower extremity, unspecified vein (hcc)  On carbamazepine therapy    Discussion/Plan:  Continue carbamazepine 300mg BID  Monitor INR as CBZ interacts with warfarin  Discussed possible side effect of bone loss with chronic CBZ use   Continue Vitamin D   Increase calcium intake by 400mg  Obtain dexa scan    Requested Prescriptions      No prescriptions requested or ordered in this encounter          We discussed in depth regarding the diagnosis, prognosis, treatment.  The patient was given ample opportunity to ask questions. All questions and concerns were addressed.      Lynnette Gorman DO  Neuromuscular and General Neurology  [facility]

## 2023-10-26 ENCOUNTER — TELEPHONE (OUTPATIENT)
Dept: NEUROLOGY | Facility: CLINIC | Age: 63
End: 2023-10-26

## 2023-10-26 DIAGNOSIS — I82.402 ACUTE DEEP VEIN THROMBOSIS (DVT) OF LEFT LOWER EXTREMITY, UNSPECIFIED VEIN (HCC): ICD-10-CM

## 2023-10-26 DIAGNOSIS — M85.89 OTHER SPECIFIED DISORDERS OF BONE DENSITY AND STRUCTURE, MULTIPLE SITES: ICD-10-CM

## 2023-10-26 DIAGNOSIS — G50.0 RIGHT TRIGEMINAL NEURALGIA: ICD-10-CM

## 2023-10-26 DIAGNOSIS — Z79.899 ON CARBAMAZEPINE THERAPY: Primary | ICD-10-CM

## 2023-10-26 NOTE — TELEPHONE ENCOUNTER
New order placed with new Dx. Cannot cancel previous order as it is already scheduled. Spoke with Puma Watts. Was able to see new order and linked everything to that new order. No further action needed from office at this time.

## 2023-10-26 NOTE — TELEPHONE ENCOUNTER
Rome arce is calling in regards to dx code not passing for his appt tomorrow. She would like a cb when it goes through so she knows that the appt is good to go.  Please advise

## 2023-10-27 ENCOUNTER — HOSPITAL ENCOUNTER (OUTPATIENT)
Dept: BONE DENSITY | Age: 63
Discharge: HOME | End: 2023-10-27
Attending: Other

## 2023-10-27 DIAGNOSIS — M85.89 OTHER SPECIFIED DISORDERS OF BONE DENSITY AND STRUCTURE, MULTIPLE SITES: ICD-10-CM

## 2023-10-27 DIAGNOSIS — I82.402 ACUTE DEEP VEIN THROMBOSIS (DVT) OF LEFT LOWER EXTREMITY, UNSPECIFIED VEIN (HCC): ICD-10-CM

## 2023-10-27 DIAGNOSIS — G50.0 RIGHT TRIGEMINAL NEURALGIA: ICD-10-CM

## 2023-10-27 DIAGNOSIS — Z79.899 ON CARBAMAZEPINE THERAPY: ICD-10-CM

## 2023-10-27 PROCEDURE — 77080 DXA BONE DENSITY AXIAL: CPT | Performed by: OTHER

## 2023-11-01 PROBLEM — M85.89 OSTEOPENIA OF MULTIPLE SITES: Status: ACTIVE | Noted: 2023-11-01

## 2023-11-02 ENCOUNTER — HOSPITAL ENCOUNTER (OUTPATIENT)
Dept: ULTRASOUND IMAGING | Age: 63
Discharge: HOME | End: 2023-11-02
Attending: FAMILY MEDICINE
Payer: MEDICARE

## 2023-11-02 DIAGNOSIS — I82.432 ACUTE DEEP VEIN THROMBOSIS (DVT) OF POPLITEAL VEIN OF LEFT LOWER EXTREMITY (HCC): ICD-10-CM

## 2023-11-02 PROCEDURE — 93971 EXTREMITY STUDY: CPT | Performed by: FAMILY MEDICINE

## 2023-11-07 ENCOUNTER — TELEPHONE (OUTPATIENT)
Dept: FAMILY MEDICINE CLINIC | Facility: CLINIC | Age: 63
End: 2023-11-07

## 2023-11-07 ENCOUNTER — ANTI-COAG VISIT (OUTPATIENT)
Dept: FAMILY MEDICINE CLINIC | Facility: CLINIC | Age: 63
End: 2023-11-07

## 2023-11-07 ENCOUNTER — LAB ENCOUNTER (OUTPATIENT)
Dept: LAB | Age: 63
End: 2023-11-07
Attending: FAMILY MEDICINE
Payer: MEDICARE

## 2023-11-07 DIAGNOSIS — I82.432 ACUTE DEEP VEIN THROMBOSIS (DVT) OF POPLITEAL VEIN OF LEFT LOWER EXTREMITY (HCC): ICD-10-CM

## 2023-11-07 DIAGNOSIS — I82.432 ACUTE DEEP VEIN THROMBOSIS (DVT) OF POPLITEAL VEIN OF LEFT LOWER EXTREMITY (HCC): Primary | ICD-10-CM

## 2023-11-07 LAB
INR BLD: 1.96 (ref 0.8–1.2)
PROTHROMBIN TIME: 22.5 SECONDS (ref 11.6–14.8)

## 2023-11-07 PROCEDURE — 85610 PROTHROMBIN TIME: CPT

## 2023-11-07 PROCEDURE — 36415 COLL VENOUS BLD VENIPUNCTURE: CPT

## 2023-11-08 NOTE — PROGRESS NOTES
Chart reviewed and found the Coumadin Clearance form completed by Dr. Imani Odonnell on 9/6/23. Pt is to stop Coumadin 4 days prior to procedure and resume day after. Pt notified.

## 2023-11-08 NOTE — PROGRESS NOTES
On 12.5 most days and 10 on Thursday and Sunday, continue  and recheck in 1 weeks.   EDITHG 11/7/2023

## 2023-11-08 NOTE — PROGRESS NOTES
Spoke with David giving him  warfarin instructions to continue current dosing and repeat testing in one week. Justyn Pimentel is having a Colonoscopy on 11/30/23 at 1p. m.and is asking for warfarin stopping and restarting instructions.    Routed to [facility]

## 2023-11-09 DIAGNOSIS — I82.432 ACUTE DEEP VEIN THROMBOSIS (DVT) OF POPLITEAL VEIN OF LEFT LOWER EXTREMITY (HCC): ICD-10-CM

## 2023-11-09 RX ORDER — WARFARIN SODIUM 5 MG/1
10 TABLET ORAL DAILY
Qty: 180 TABLET | Refills: 0 | Status: SHIPPED | OUTPATIENT
Start: 2023-11-09

## 2023-11-09 NOTE — TELEPHONE ENCOUNTER
Refill request for:    Requested Prescriptions     Pending Prescriptions Disp Refills    warfarin 5 MG Oral Tab 180 tablet 1     Sig: Take 2 tablets (10 mg total) by mouth daily. Take as directed daily. Last Prescribed Quantity Refills   5/16/23 180 1     LOV 10/12/2023     Patient was asked to follow-up in: 6 months    Appointment scheduled: 4/12/2024 Martina Rosario MD    Medication not on protocol.      # 180 with 1 refills routed to Shania Priest MD for review

## 2023-11-10 RX ORDER — WARFARIN SODIUM 5 MG/1
10 TABLET ORAL DAILY
Qty: 180 TABLET | Refills: 1 | OUTPATIENT
Start: 2023-11-10

## 2023-11-15 ENCOUNTER — LAB ENCOUNTER (OUTPATIENT)
Dept: LAB | Age: 63
End: 2023-11-15
Attending: FAMILY MEDICINE
Payer: MEDICARE

## 2023-11-15 ENCOUNTER — ANTI-COAG VISIT (OUTPATIENT)
Dept: FAMILY MEDICINE CLINIC | Facility: CLINIC | Age: 63
End: 2023-11-15

## 2023-11-15 DIAGNOSIS — R79.0 ABNORMAL IRON SATURATION: ICD-10-CM

## 2023-11-15 DIAGNOSIS — I82.432 ACUTE DEEP VEIN THROMBOSIS (DVT) OF POPLITEAL VEIN OF LEFT LOWER EXTREMITY (HCC): Primary | ICD-10-CM

## 2023-11-15 DIAGNOSIS — I82.432 ACUTE DEEP VEIN THROMBOSIS (DVT) OF POPLITEAL VEIN OF LEFT LOWER EXTREMITY (HCC): ICD-10-CM

## 2023-11-15 LAB
DEPRECATED HBV CORE AB SER IA-ACNC: 145.9 NG/ML
INR BLD: 2.45 (ref 0.8–1.2)
IRON SATN MFR SERPL: 57 %
IRON SERPL-MCNC: 133 UG/DL
PROTHROMBIN TIME: 26.9 SECONDS (ref 11.6–14.8)
TIBC SERPL-MCNC: 232 UG/DL (ref 240–450)
TRANSFERRIN SERPL-MCNC: 156 MG/DL (ref 200–360)

## 2023-11-15 PROCEDURE — 83540 ASSAY OF IRON: CPT

## 2023-11-15 PROCEDURE — 85610 PROTHROMBIN TIME: CPT

## 2023-11-15 PROCEDURE — 83550 IRON BINDING TEST: CPT

## 2023-11-15 PROCEDURE — 36415 COLL VENOUS BLD VENIPUNCTURE: CPT

## 2023-11-15 PROCEDURE — 82728 ASSAY OF FERRITIN: CPT

## 2023-11-16 NOTE — PROGRESS NOTES
Patient reports he has a colonoscopy scheduled for 11/30 and has to stop Coumadin 4 days prior. Pt will resume Coumadin night of procedure. When should he recheck after resuming?     Routed to Dr. Dc Johns

## 2023-11-28 DIAGNOSIS — I10 ESSENTIAL HYPERTENSION: ICD-10-CM

## 2023-11-28 RX ORDER — LISINOPRIL 10 MG/1
10 TABLET ORAL DAILY
Qty: 90 TABLET | Refills: 1 | Status: SHIPPED | OUTPATIENT
Start: 2023-11-28

## 2023-11-28 NOTE — TELEPHONE ENCOUNTER
Requested Prescriptions     Pending Prescriptions Disp Refills    LISINOPRIL 10 MG Oral Tab [Pharmacy Med Name: Lisinopril 10 MG Oral Tablet] 90 tablet 3     Sig: TAKE 1 TABLET BY MOUTH  DAILY     LOV 10/12/2023     Patient was asked to follow-up in: 6 months    Appointment scheduled: 4/12/2024 Davie Marie MD     Medication refilled per protocol.

## 2023-11-30 PROBLEM — D12.0 BENIGN NEOPLASM OF CECUM: Status: ACTIVE | Noted: 2023-11-30

## 2023-11-30 PROBLEM — Z86.0101 HISTORY OF ADENOMATOUS POLYP OF COLON: Status: ACTIVE | Noted: 2023-11-30

## 2023-11-30 PROBLEM — Z86.010 HISTORY OF ADENOMATOUS POLYP OF COLON: Status: ACTIVE | Noted: 2023-11-30

## 2023-11-30 PROBLEM — D12.2 BENIGN NEOPLASM OF ASCENDING COLON: Status: ACTIVE | Noted: 2023-11-30

## 2023-12-21 ENCOUNTER — LAB ENCOUNTER (OUTPATIENT)
Dept: LAB | Age: 63
End: 2023-12-21
Attending: FAMILY MEDICINE
Payer: MEDICARE

## 2023-12-21 ENCOUNTER — ANTI-COAG VISIT (OUTPATIENT)
Dept: FAMILY MEDICINE CLINIC | Facility: CLINIC | Age: 63
End: 2023-12-21

## 2023-12-21 DIAGNOSIS — I82.432 ACUTE DEEP VEIN THROMBOSIS (DVT) OF POPLITEAL VEIN OF LEFT LOWER EXTREMITY (HCC): Primary | ICD-10-CM

## 2023-12-21 DIAGNOSIS — I82.432 ACUTE DEEP VEIN THROMBOSIS (DVT) OF POPLITEAL VEIN OF LEFT LOWER EXTREMITY (HCC): ICD-10-CM

## 2023-12-21 LAB
INR BLD: 1.94 (ref 0.8–1.2)
PROTHROMBIN TIME: 22.3 SECONDS (ref 11.6–14.8)

## 2023-12-21 PROCEDURE — 85610 PROTHROMBIN TIME: CPT

## 2023-12-21 PROCEDURE — 36415 COLL VENOUS BLD VENIPUNCTURE: CPT

## 2023-12-21 NOTE — PROGRESS NOTES
On 12.5 most days and 10 on Thursday and Sunday, continue  and recheck in 2 weeks.   EDITHG 12/21/2023

## 2023-12-22 ENCOUNTER — TELEPHONE (OUTPATIENT)
Dept: FAMILY MEDICINE CLINIC | Facility: CLINIC | Age: 63
End: 2023-12-22

## 2023-12-22 DIAGNOSIS — E78.2 MIXED HYPERLIPIDEMIA: ICD-10-CM

## 2023-12-22 DIAGNOSIS — R73.03 PREDIABETES: ICD-10-CM

## 2023-12-22 DIAGNOSIS — K76.0 FATTY LIVER DISEASE, NONALCOHOLIC: ICD-10-CM

## 2023-12-22 DIAGNOSIS — I82.432 ACUTE DEEP VEIN THROMBOSIS (DVT) OF POPLITEAL VEIN OF LEFT LOWER EXTREMITY (HCC): Primary | ICD-10-CM

## 2023-12-22 DIAGNOSIS — Z86.718 HISTORY OF DVT OF LOWER EXTREMITY: ICD-10-CM

## 2023-12-22 NOTE — TELEPHONE ENCOUNTER
Pt with a number of questions/concerns.     Pt had abd US on 8/25/23 (ordered by SHARIFA Alicia) which showed fatty liver. Pt interested in meeting with a dietician to discuss dietary changes he should be making. He notes he does not drink alcohol. Order pended.    He had some iron labs done in November. Ordering provider recommended he f/u with PCP regarding results.     Component      Latest Ref Rng 11/15/2023   Iron, Serum      65 - 175 ug/dL 133    Transferrin      200 - 360 mg/dL 156 (L)    Iron Bind.Cap.(TIBC)      240 - 450 ug/dL 232 (L)    Iron Saturation      20 - 50 % 57 (H)    FERRITIN      30.0 - 530.0 ng/mL 145.9         Sergio Hernandez,  I have reviewed your results and they show your iron saturation remains slightly elevated but your iron level and ferritin are normal. With these labs continuing to reveal this, I recommend discussing further with your primary care provider. This can be an non-concerning finding but I recommend you discuss this further with him and he continues monitoring. Please let me know if you have any questions or concerns.     Thank you,  SHARIFA Maldonado, FNP-BC       3. Pt had US venous doppler on 11/2/2023 to f/u on DVT.    He would like to schedule his next one.    needs order (pended)  wants to confirm when US should be repeated      Pt is currently scheduled for 4/12/2024 Dr. Morales. He questions whether he should be seen sooner to address these concerns?        Routed to Dr. Morales

## 2023-12-26 NOTE — TELEPHONE ENCOUNTER
1. Acute deep vein thrombosis (DVT) of popliteal vein of left lower extremity (HCC) (Primary)  Overview:  3/31/23 after Covid, Eliquis 5 initially but switched to warfarin due to interaction with Carbamezapine.   2. Fatty liver disease, nonalcoholic  -     Referral to Nutritionist/Dietician  3. Mixed hyperlipidemia  Overview:  LDL  with TLC  Orders:  -     Referral to Nutritionist/Dietician  4. Prediabetes  Overview:  A1c 5.8 in 2021  Orders:  -     Referral to Nutritionist/Dietician  5. History of DVT of lower extremity  -     US VENOUS DOPPLER LEG LEFT - DIAG IMG (CPT=93971); Future; Expected date: 12/26/2023       OK to notify. Thanks, Nahid Morales MD

## 2023-12-27 NOTE — TELEPHONE ENCOUNTER
Dr. Morales I placed referral for outpatient dietitian, Brenda Marcus RD.  I also gave patient number to call to schedule his ultrasound.   Do you still want that done in a few months or sooner?  Also patient is worried about this fatty liver disease.  He would like to know how concerned you are about his numbers.  He states no family history of this.   Could you please answer patient on mychart when to get ultrasound and also how concerned you are about fatty liver.

## 2024-01-09 ENCOUNTER — OFFICE VISIT (OUTPATIENT)
Dept: NUTRITION | Facility: HOSPITAL | Age: 64
End: 2024-01-09
Attending: FAMILY MEDICINE
Payer: MEDICARE

## 2024-01-09 PROBLEM — K76.0 FATTY LIVER DISEASE, NONALCOHOLIC: Status: ACTIVE | Noted: 2024-01-09

## 2024-01-09 PROCEDURE — 97802 MEDICAL NUTRITION INDIV IN: CPT

## 2024-01-10 NOTE — PROGRESS NOTES
ADULT INITIAL OUTPATIENT NUTRITION CONSULTATION    Nutrition Assessment    Medical Diagnosis: NAFLD    PMH: DVT, Obesity, HTN, hyperlipidemia, osteoarthritis of knees    Client Hx: talkative 63 year old male    Meds: coumadin, Vit.D, calcium    Labs: noted    ANTHROPOMETRICS:  Ht: 5'7\"  Wt: 221#   (as reported today)  BMI: 34  AdjBW: 177#  Goal Weight: 194-200#    Current Diet: high in juice, limited f&v    Food/Beverage Intake:   BREAKFAST: skips, or egg sandwich w/white bread and jelly  LUNCH: breaded chicken, rice, beans OR pork tamales, OR out  DINNER: navy bean ham soup and crackers, OR chips and mac salad, OR chicken thighs, fries  SNACKS: strawberries, chips  BEVERAGES: OJ 14oz, 2% milk 16oz, diet soda 3x/wk, diet iced tea    Meal pattern: 1-3 meals/d, 1-2 snacks/d    Number of meals eaten at restaurants: 3x/month    Alcohol Intake:  avoids    Diet Quality: Needs Improvement    Nutrient intake (based on diet record): ~0434-3757 cals/d    Estimated nutrition needs: 1800 cals/d, 100 gm protein    Physical Activity:  retired from [employer], has knee and back pain  GOAL: 150+ min/wk, walking and adding strength training    Sleep:  not asked  Stress:  not asked    Client attended appt with: spouse Danya to attend next session as she prepares the meals    Nutrition Diagnosis: Food and Nutrition related knowledge deficit related to lack of previous diet education by RD as evidence by pt need for education regarding weight loss management and NAFLD.    Nutrition Intervention/Education: Comprehensive nutrition education and evaluation provided for weight loss management and NAFLD. Pt reports a desire to lose ~20#. Discussed importance of variety and balance of all foods groups, portion control, label reading and exercise. Encouraged lean meats, whole grains, more f&v, low fat dairy and avoiding added sugars and processed foods. Pt agrees to limit OJ and drink more water. Suggested small, frequent meals to aid w  portion control and hunger. Snack ideas and helpful web sites provided. All questions answered today. Pt has set goals to follow recommendations set today, to track intake using phone román, MFP, and to contact RD with further questions or concerns.          PLAN/GOALS:  1-increase activity  2-include more veggies-50% of plate  3-more water, less soda and OJ  4-portion control    Monitoring/Evaluation:   Pt to follow with MD.   Follow up appt with RD scheduled for 7 weeks: 2/29/24-11am    Time spent with patient: 90 minutes    Thank you for the referral,    Brenda Marcus RD, LDN  [email or web address]  P: [phone]

## 2024-01-11 ENCOUNTER — LAB ENCOUNTER (OUTPATIENT)
Dept: LAB | Age: 64
End: 2024-01-11
Attending: FAMILY MEDICINE
Payer: MEDICARE

## 2024-01-11 ENCOUNTER — ANTI-COAG VISIT (OUTPATIENT)
Dept: FAMILY MEDICINE CLINIC | Facility: CLINIC | Age: 64
End: 2024-01-11

## 2024-01-11 ENCOUNTER — TELEPHONE (OUTPATIENT)
Dept: FAMILY MEDICINE CLINIC | Facility: CLINIC | Age: 64
End: 2024-01-11

## 2024-01-11 DIAGNOSIS — I82.432 ACUTE DEEP VEIN THROMBOSIS (DVT) OF POPLITEAL VEIN OF LEFT LOWER EXTREMITY (HCC): Primary | ICD-10-CM

## 2024-01-11 DIAGNOSIS — I82.432 ACUTE DEEP VEIN THROMBOSIS (DVT) OF POPLITEAL VEIN OF LEFT LOWER EXTREMITY (HCC): ICD-10-CM

## 2024-01-11 LAB
INR BLD: 2.4 (ref 0.8–1.2)
PROTHROMBIN TIME: 26.4 SECONDS (ref 11.6–14.8)

## 2024-01-11 PROCEDURE — 85610 PROTHROMBIN TIME: CPT

## 2024-01-11 PROCEDURE — 36415 COLL VENOUS BLD VENIPUNCTURE: CPT

## 2024-01-11 NOTE — TELEPHONE ENCOUNTER
Pt was due for repeat INR on 1/4/23.    Called pt to remind him to complete testing.    He states he will go to the lab now.

## 2024-01-12 ENCOUNTER — TELEPHONE (OUTPATIENT)
Dept: FAMILY MEDICINE CLINIC | Facility: CLINIC | Age: 64
End: 2024-01-12

## 2024-01-12 NOTE — PROGRESS NOTES
On 12.5 most days and 10 on Thursday and Sunday, continue  and recheck in 2 weeks.  EDITHG 1/11/2024

## 2024-02-02 ENCOUNTER — HOSPITAL ENCOUNTER (OUTPATIENT)
Dept: ULTRASOUND IMAGING | Age: 64
Discharge: HOME | End: 2024-02-02
Attending: FAMILY MEDICINE
Payer: MEDICARE

## 2024-02-02 DIAGNOSIS — Z86.718 HISTORY OF DVT OF LOWER EXTREMITY: ICD-10-CM

## 2024-02-02 PROCEDURE — 93971 EXTREMITY STUDY: CPT | Performed by: FAMILY MEDICINE

## 2024-02-05 ENCOUNTER — TELEPHONE (OUTPATIENT)
Dept: FAMILY MEDICINE CLINIC | Facility: CLINIC | Age: 64
End: 2024-02-05

## 2024-02-05 NOTE — TELEPHONE ENCOUNTER
Patient questioning if continuing Coumadin will further improve his DVT or if the medication has helped it as much as it is going to and it is okay to stop?    He states he is fine with taking it another 90 days and re-evaluating US to see if improvement was made.    Pt would like Dr. Morales's opinion on which direction to go with this.    Routed to Dr. Morales

## 2024-02-05 NOTE — TELEPHONE ENCOUNTER
I sent him a MyChart message with my opinion.  Realistically there does not seem to be any more improvement by continue the medication as it is likely a little gotten to its long-term status.  He never quite completely get rid of the long-term scar tissue/background plaque but there is nothing active that would break off and cause problems.  It is more a level of his level of worry.  After 6 months like this he should be good.  Thanks

## 2024-02-05 NOTE — TELEPHONE ENCOUNTER
Notified patient of information and understanding expressed.Pt plans to stop medication per Dr. Morales recommendation on Mychart message     Beau, your US looks the same- with this amount of time and good stability, you could stop the medication now if you like, or you can wait until our April visit to discuss as well. We can discuss at your 4/12/2024  Thanks, Nahid Morales MD

## 2024-02-14 ENCOUNTER — LAB ENCOUNTER (OUTPATIENT)
Dept: LAB | Age: 64
End: 2024-02-14
Attending: FAMILY MEDICINE
Payer: MEDICARE

## 2024-02-14 DIAGNOSIS — K76.0 FATTY LIVER: ICD-10-CM

## 2024-02-14 LAB
ALBUMIN SERPL-MCNC: 3.7 G/DL (ref 3.4–5)
ALBUMIN/GLOB SERPL: 1.4 {RATIO} (ref 1–2)
ALP LIVER SERPL-CCNC: 97 U/L
ALT SERPL-CCNC: 30 U/L
ANION GAP SERPL CALC-SCNC: 4 MMOL/L (ref 0–18)
AST SERPL-CCNC: 18 U/L (ref 15–37)
BILIRUB SERPL-MCNC: 0.3 MG/DL (ref 0.1–2)
BUN BLD-MCNC: 19 MG/DL (ref 9–23)
CALCIUM BLD-MCNC: 8.7 MG/DL (ref 8.5–10.1)
CHLORIDE SERPL-SCNC: 109 MMOL/L (ref 98–112)
CO2 SERPL-SCNC: 28 MMOL/L (ref 21–32)
CREAT BLD-MCNC: 0.84 MG/DL
EGFRCR SERPLBLD CKD-EPI 2021: 98 ML/MIN/1.73M2 (ref 60–?)
FASTING STATUS PATIENT QL REPORTED: YES
GLOBULIN PLAS-MCNC: 2.7 G/DL (ref 2.8–4.4)
GLUCOSE BLD-MCNC: 96 MG/DL (ref 70–99)
OSMOLALITY SERPL CALC.SUM OF ELEC: 294 MOSM/KG (ref 275–295)
POTASSIUM SERPL-SCNC: 3.9 MMOL/L (ref 3.5–5.1)
PROT SERPL-MCNC: 6.4 G/DL (ref 6.4–8.2)
SODIUM SERPL-SCNC: 141 MMOL/L (ref 136–145)

## 2024-02-14 PROCEDURE — 80053 COMPREHEN METABOLIC PANEL: CPT

## 2024-02-14 PROCEDURE — 36415 COLL VENOUS BLD VENIPUNCTURE: CPT

## 2024-02-23 ENCOUNTER — HOSPITAL ENCOUNTER (OUTPATIENT)
Dept: ULTRASOUND IMAGING | Age: 64
Discharge: HOME | End: 2024-02-23
Attending: FAMILY MEDICINE
Payer: MEDICARE

## 2024-02-23 ENCOUNTER — TELEPHONE (OUTPATIENT)
Dept: FAMILY MEDICINE CLINIC | Facility: CLINIC | Age: 64
End: 2024-02-23

## 2024-02-23 DIAGNOSIS — K82.4 GALLBLADDER POLYP: ICD-10-CM

## 2024-02-23 DIAGNOSIS — K76.0 FATTY LIVER: ICD-10-CM

## 2024-02-23 PROCEDURE — 76700 US EXAM ABDOM COMPLETE: CPT | Performed by: FAMILY MEDICINE

## 2024-02-23 NOTE — TELEPHONE ENCOUNTER
Called and talked to the patient, went over the results and discussed what the GINP said discussed liver health and  what he has told him he should not worry about this at this time and he will discuss it at his appointment with Dr Morales at his next appointment

## 2024-02-29 ENCOUNTER — TELEPHONE (OUTPATIENT)
Dept: FAMILY MEDICINE CLINIC | Facility: CLINIC | Age: 64
End: 2024-02-29

## 2024-02-29 ENCOUNTER — OFFICE VISIT (OUTPATIENT)
Dept: NUTRITION | Facility: HOSPITAL | Age: 64
End: 2024-02-29
Payer: MEDICARE

## 2024-02-29 PROCEDURE — 97802 MEDICAL NUTRITION INDIV IN: CPT

## 2024-02-29 NOTE — PROGRESS NOTES
ADULT OUTPATIENT NUTRITION FOLLOW UP      Nutrition Assessment    Number of consultations with dietitian: 2nd visit    Ht: 5'7\" Wt: 219# (todays weight)  Loss of ~2# since visit, was up to 226#, then 222#    Additional Labs since last consultation: n/a    Diet/Lifestyle Modifications Following Previous Nutrition Consultation  -pt has been avoiding milk  -portion of juice now 5oz, not 14oz  -adding more veggies, likes salads  -increased water to 3 bottles/dy    Food/Beverage Intake: skips breakfast but likes hard boiled eggs sometimes, avocado, chicken breast, chicken soups, salads, avoiding chips and sweets  Beverages: having only 5oz juice/day, soda 1x/wk, water    Meal Pattern:2 meals/d, 1 snacks/d    Physical Activity: not at goal d/t knee and ankle pain and back pain. Daily activity noted, aware to try strength training or bike    Diet Quality: Improved    Client attended appt with: spouse not able to attend today    Nutrition Intervention/Education: Comprehensive nutrition follow up education provided for weight loss management for recent fatty liver dx. Pt reports making several changes as noted, avoiding 2% milk, limiting juice, watching labels, and adding more veggies (not on coumadin anymore). Spoke with pt and wife on phone last week, several questions answered. Spouse not ready to compromise on some cooking habits and ingredients. Pt doing well with food choices, encouraged nutrient dense foods to choose, omega 3's and portion control. Pt logging intake for this visit only, aware of some behavior modifications as well moving forward. Encouragement provided.     GOALS/PLAN:  1-activity 150+ min/wk  2-variety of nutrient dense foods/portion control  3-continue w increased veggies  4-increased water, decreased juice    Monitoring/Evaluation: Pt to follow with MD, labs in summer. Will follow with RD in 2 months: 4/25/23 11am, appt made in [place].    Time spent with pt: 60 minutes    Thank you for the  referral    Brenda Marcus RD, LDN  [email or web address]  P: [phone]

## 2024-02-29 NOTE — TELEPHONE ENCOUNTER
Pt states he is no longer taking Coumadin per last US results. No need for further INR testing. INR episode resolved in chart.

## 2024-03-26 ENCOUNTER — TELEPHONE (OUTPATIENT)
Dept: FAMILY MEDICINE CLINIC | Facility: CLINIC | Age: 64
End: 2024-03-26

## 2024-03-26 DIAGNOSIS — Z13.6 SCREENING FOR CARDIOVASCULAR CONDITION: ICD-10-CM

## 2024-03-26 DIAGNOSIS — Z51.81 THERAPEUTIC DRUG MONITORING: ICD-10-CM

## 2024-03-26 DIAGNOSIS — Z13.29 SCREENING FOR THYROID DISORDER: ICD-10-CM

## 2024-03-26 DIAGNOSIS — Z79.899 LONG-TERM USE OF HIGH-RISK MEDICATION: ICD-10-CM

## 2024-03-26 DIAGNOSIS — Z13.1 SCREENING FOR DIABETES MELLITUS: Primary | ICD-10-CM

## 2024-03-26 DIAGNOSIS — I10 ESSENTIAL HYPERTENSION: ICD-10-CM

## 2024-03-26 DIAGNOSIS — E07.9 THYROID DISORDER: ICD-10-CM

## 2024-03-26 DIAGNOSIS — R73.03 PREDIABETES: ICD-10-CM

## 2024-03-26 DIAGNOSIS — E78.5 DYSLIPIDEMIA: ICD-10-CM

## 2024-03-26 DIAGNOSIS — Z13.0 SCREENING FOR IRON DEFICIENCY ANEMIA: ICD-10-CM

## 2024-03-26 DIAGNOSIS — E78.2 MIXED HYPERLIPIDEMIA: ICD-10-CM

## 2024-03-28 NOTE — TELEPHONE ENCOUNTER
1. Screening for diabetes mellitus (Primary)  -     Hemoglobin A1C; Future; Expected date: 03/28/2024  -     Comp Metabolic Panel (14); Future; Expected date: 03/28/2024  2. Prediabetes  Overview:  A1c 5.8 in 2021  Orders:  -     TSH W Reflex To Free T4; Future; Expected date: 03/28/2024  -     Lipid Panel; Future; Expected date: 03/28/2024  -     Hemoglobin A1C; Future; Expected date: 03/28/2024  3. Mixed hyperlipidemia  Overview:  LDL  with TLC  4. Screening for iron deficiency anemia  -     CBC With Differential With Platelet; Future; Expected date: 03/28/2024  5. Screening for thyroid disorder  -     TSH W Reflex To Free T4; Future; Expected date: 03/28/2024  6. Screening for cardiovascular condition  -     Lipid Panel; Future; Expected date: 03/28/2024  7. Long-term use of high-risk medication  -     Carbamazepine (Tegretol) Total; Future; Expected date: 03/28/2024  -     Comp Metabolic Panel (14); Future; Expected date: 03/28/2024  8. Therapeutic drug monitoring  -     Carbamazepine (Tegretol) Total; Future; Expected date: 03/28/2024  9. Dyslipidemia  -     TSH W Reflex To Free T4; Future; Expected date: 03/28/2024  -     Lipid Panel; Future; Expected date: 03/28/2024  -     Comp Metabolic Panel (14); Future; Expected date: 03/28/2024  10. Thyroid disorder  -     TSH W Reflex To Free T4; Future; Expected date: 03/28/2024  11. Essential hypertension  Overview:  Lisinopril 10  Orders:  -     TSH W Reflex To Free T4; Future; Expected date: 03/28/2024  -     CBC With Differential With Platelet; Future; Expected date: 03/28/2024       OK to notify. Thanks, Nahid Morales MD

## 2024-04-09 ENCOUNTER — LAB ENCOUNTER (OUTPATIENT)
Dept: LAB | Age: 64
End: 2024-04-09
Attending: FAMILY MEDICINE
Payer: MEDICARE

## 2024-04-09 DIAGNOSIS — R73.03 PREDIABETES: ICD-10-CM

## 2024-04-09 DIAGNOSIS — Z13.0 SCREENING FOR IRON DEFICIENCY ANEMIA: ICD-10-CM

## 2024-04-09 DIAGNOSIS — Z13.29 SCREENING FOR THYROID DISORDER: ICD-10-CM

## 2024-04-09 DIAGNOSIS — E78.5 DYSLIPIDEMIA: ICD-10-CM

## 2024-04-09 DIAGNOSIS — Z79.899 LONG-TERM USE OF HIGH-RISK MEDICATION: ICD-10-CM

## 2024-04-09 DIAGNOSIS — E07.9 THYROID DISORDER: ICD-10-CM

## 2024-04-09 DIAGNOSIS — Z13.6 SCREENING FOR CARDIOVASCULAR CONDITION: ICD-10-CM

## 2024-04-09 DIAGNOSIS — I10 ESSENTIAL HYPERTENSION: ICD-10-CM

## 2024-04-09 DIAGNOSIS — Z13.1 SCREENING FOR DIABETES MELLITUS: ICD-10-CM

## 2024-04-09 DIAGNOSIS — Z51.81 THERAPEUTIC DRUG MONITORING: ICD-10-CM

## 2024-04-09 LAB
ALBUMIN SERPL-MCNC: 3.7 G/DL (ref 3.4–5)
ALBUMIN/GLOB SERPL: 1.3 {RATIO} (ref 1–2)
ALP LIVER SERPL-CCNC: 97 U/L
ALT SERPL-CCNC: 29 U/L
ANION GAP SERPL CALC-SCNC: 6 MMOL/L (ref 0–18)
AST SERPL-CCNC: 15 U/L (ref 15–37)
BASOPHILS # BLD AUTO: 0.04 X10(3) UL (ref 0–0.2)
BASOPHILS NFR BLD AUTO: 0.6 %
BILIRUB SERPL-MCNC: 0.4 MG/DL (ref 0.1–2)
BUN BLD-MCNC: 16 MG/DL (ref 9–23)
CALCIUM BLD-MCNC: 9.7 MG/DL (ref 8.5–10.1)
CARBAMAZEPINE SERPL-MCNC: 7.9 UG/ML (ref 4–12)
CHLORIDE SERPL-SCNC: 106 MMOL/L (ref 98–112)
CHOLEST SERPL-MCNC: 239 MG/DL (ref ?–200)
CO2 SERPL-SCNC: 25 MMOL/L (ref 21–32)
CREAT BLD-MCNC: 0.82 MG/DL
EGFRCR SERPLBLD CKD-EPI 2021: 99 ML/MIN/1.73M2 (ref 60–?)
EOSINOPHIL # BLD AUTO: 0.44 X10(3) UL (ref 0–0.7)
EOSINOPHIL NFR BLD AUTO: 6.4 %
ERYTHROCYTE [DISTWIDTH] IN BLOOD BY AUTOMATED COUNT: 13.2 %
EST. AVERAGE GLUCOSE BLD GHB EST-MCNC: 114 MG/DL (ref 68–126)
FASTING PATIENT LIPID ANSWER: YES
FASTING STATUS PATIENT QL REPORTED: YES
GLOBULIN PLAS-MCNC: 2.9 G/DL (ref 2.8–4.4)
GLUCOSE BLD-MCNC: 96 MG/DL (ref 70–99)
HBA1C MFR BLD: 5.6 % (ref ?–5.7)
HCT VFR BLD AUTO: 44.3 %
HDLC SERPL-MCNC: 62 MG/DL (ref 40–59)
HGB BLD-MCNC: 15 G/DL
IMM GRANULOCYTES # BLD AUTO: 0.01 X10(3) UL (ref 0–1)
IMM GRANULOCYTES NFR BLD: 0.1 %
LDLC SERPL CALC-MCNC: 153 MG/DL (ref ?–100)
LYMPHOCYTES # BLD AUTO: 2.29 X10(3) UL (ref 1–4)
LYMPHOCYTES NFR BLD AUTO: 33.4 %
MCH RBC QN AUTO: 32 PG (ref 26–34)
MCHC RBC AUTO-ENTMCNC: 33.9 G/DL (ref 31–37)
MCV RBC AUTO: 94.5 FL
MONOCYTES # BLD AUTO: 0.68 X10(3) UL (ref 0.1–1)
MONOCYTES NFR BLD AUTO: 9.9 %
NEUTROPHILS # BLD AUTO: 3.39 X10 (3) UL (ref 1.5–7.7)
NEUTROPHILS # BLD AUTO: 3.39 X10(3) UL (ref 1.5–7.7)
NEUTROPHILS NFR BLD AUTO: 49.6 %
NONHDLC SERPL-MCNC: 177 MG/DL (ref ?–130)
OSMOLALITY SERPL CALC.SUM OF ELEC: 285 MOSM/KG (ref 275–295)
PLATELET # BLD AUTO: 233 10(3)UL (ref 150–450)
POTASSIUM SERPL-SCNC: 4.3 MMOL/L (ref 3.5–5.1)
PROT SERPL-MCNC: 6.6 G/DL (ref 6.4–8.2)
RBC # BLD AUTO: 4.69 X10(6)UL
SODIUM SERPL-SCNC: 137 MMOL/L (ref 136–145)
TRIGL SERPL-MCNC: 135 MG/DL (ref 30–149)
TSI SER-ACNC: 3.34 MIU/ML (ref 0.36–3.74)
VLDLC SERPL CALC-MCNC: 26 MG/DL (ref 0–30)
WBC # BLD AUTO: 6.9 X10(3) UL (ref 4–11)

## 2024-04-09 PROCEDURE — 83036 HEMOGLOBIN GLYCOSYLATED A1C: CPT

## 2024-04-09 PROCEDURE — 36415 COLL VENOUS BLD VENIPUNCTURE: CPT

## 2024-04-09 PROCEDURE — 84443 ASSAY THYROID STIM HORMONE: CPT

## 2024-04-09 PROCEDURE — 80061 LIPID PANEL: CPT

## 2024-04-09 PROCEDURE — 80053 COMPREHEN METABOLIC PANEL: CPT

## 2024-04-09 PROCEDURE — 85025 COMPLETE CBC W/AUTO DIFF WBC: CPT

## 2024-04-09 PROCEDURE — 80156 ASSAY CARBAMAZEPINE TOTAL: CPT

## 2024-04-12 ENCOUNTER — TELEPHONE (OUTPATIENT)
Dept: FAMILY MEDICINE CLINIC | Facility: CLINIC | Age: 64
End: 2024-04-12

## 2024-04-12 ENCOUNTER — OFFICE VISIT (OUTPATIENT)
Dept: FAMILY MEDICINE CLINIC | Facility: CLINIC | Age: 64
End: 2024-04-12
Payer: MEDICARE

## 2024-04-12 VITALS
RESPIRATION RATE: 14 BRPM | HEART RATE: 74 BPM | DIASTOLIC BLOOD PRESSURE: 80 MMHG | WEIGHT: 221.81 LBS | BODY MASS INDEX: 34.81 KG/M2 | HEIGHT: 67 IN | SYSTOLIC BLOOD PRESSURE: 122 MMHG

## 2024-04-12 DIAGNOSIS — G50.0 TRIGEMINAL NEURALGIA: ICD-10-CM

## 2024-04-12 DIAGNOSIS — I82.432 ACUTE DEEP VEIN THROMBOSIS (DVT) OF POPLITEAL VEIN OF LEFT LOWER EXTREMITY (HCC): ICD-10-CM

## 2024-04-12 DIAGNOSIS — R73.03 PREDIABETES: ICD-10-CM

## 2024-04-12 DIAGNOSIS — E78.2 MIXED HYPERLIPIDEMIA: Primary | ICD-10-CM

## 2024-04-12 DIAGNOSIS — E03.8 SUBCLINICAL HYPOTHYROIDISM: ICD-10-CM

## 2024-04-12 DIAGNOSIS — I10 ESSENTIAL HYPERTENSION: ICD-10-CM

## 2024-04-12 NOTE — ASSESSMENT & PLAN NOTE
Last A1c value was 5.6% done 4/9/2024.    Sugar control is well controlled, no significant medication side effects noted, Pre-Diabetic  .  Not currently on Diabetic meds.

## 2024-04-12 NOTE — PROGRESS NOTES
David is a 63 year old male coming in for had concerns including Blood Pressure (6 months follow up ).    Subjective:   Blood Pressure       Doing well, hx DVT 1 year aog that was affected by the meds.   Recent left leg tenderness with lots of ladder work, he is left handed and likely left legged. Better after 1 week.   Fatty liver, seen on US. Working on Therapuetic Lifestyle Change  Lipids no change with .    Objective:   /80   Pulse 74   Resp 14   Ht 5' 7\" (1.702 m)   Wt 221 lb 12.8 oz (100.6 kg)   BMI 34.74 kg/m²  Body mass index is 34.74 kg/m².   Physical Exam  Vitals and nursing note reviewed.   Constitutional:       General: He is not in acute distress.     Appearance: Normal appearance.   HENT:      Head: Normocephalic.   Cardiovascular:      Rate and Rhythm: Normal rate and regular rhythm.      Pulses:           Posterior tibial pulses are 2+ on the right side and 2+ on the left side.      Heart sounds: Normal heart sounds. No murmur heard.  Pulmonary:      Effort: Pulmonary effort is normal. No respiratory distress.      Breath sounds: Normal breath sounds. No wheezing.   Abdominal:      General: Bowel sounds are normal.      Palpations: Abdomen is soft.      Tenderness: There is no abdominal tenderness.   Musculoskeletal:      Cervical back: Normal range of motion.      Right lower leg: No edema.      Left lower leg: No edema.   Skin:     General: Skin is warm and dry.      Findings: No rash.   Neurological:      Mental Status: He is alert and oriented to person, place, and time.   Psychiatric:         Mood and Affect: Mood normal.         Behavior: Behavior normal.         Thought Content: Thought content normal.         Judgment: Judgment normal.           Assessment & Plan:   1. Mixed hyperlipidemia (Primary)  Overview:  LDL  with TLC  Assessment & Plan:  Cholesterol shows Good control. Long term heart-healthy diet and lifestyle discussed and encouraged to reduce risk of  cardiovascular disease.  Cholesterol: 239, done on 4/9/2024.  HDL Cholesterol: 62, done on 4/9/2024.  TriGlycerides 135, done on 4/9/2024.  LDL Cholesterol: 153, done on 4/9/2024.   No current Cholesterol medication.     2. Essential hypertension  Overview:  Lisinopril 10  Assessment & Plan:  BP shows good control with last BP of 124/72. Continue lifestyle changes, diet, exercise and weight loss.   Last K was 4.3 done on 4/9/2024.  Last Cr was 0.82 done on 4/9/2024.  Last eGFR was 99 on 4/9/2024.  BP Meds: lisinopril Tabs - 10 MG    3. Subclinical hypothyroidism  Overview:  TSH 4 in 2022  Assessment & Plan:  Thyroid shows Good control.   TSH: 3.34, done on 4/9/2024.  FT4: 1, done on 10/3/2022.       4. Prediabetes  Overview:  A1c 5.8 in 2021  Assessment & Plan:  Last A1c value was 5.6% done 4/9/2024.    Sugar control is well controlled, no significant medication side effects noted, Pre-Diabetic  .  Not currently on Diabetic meds.        5. Acute deep vein thrombosis (DVT) of popliteal vein of left lower extremity (HCC)  Overview:  3/31/23 after Covid, Eliquis 5 initially but switched to warfarin due to interaction with Carbamezapine. Off med early 2024  Assessment & Plan:  Stable with good US in early 2024. Continue to monito.   6. Trigeminal neuralgia  Overview:  Carbamezapine, per neurolog  Assessment & Plan:  Stable, continue present management        I have discontinued David Hernandez Jr.'s warfarin. I am also having him maintain his Vitamin D, Calcium Carbonate-Vitamin D, ibuprofen, famotidine, carBAMazepine, and lisinopril.       Return in about 6 months (around 10/12/2024) for AWV with chonic condition follow up.

## 2024-04-12 NOTE — TELEPHONE ENCOUNTER
Please enter lab orders for the patient's upcoming physical appointment.     Physical scheduled:   Your appointments       Date & Time Appointment Department ([place])    Oct 16, 2024 12:00 PM CDT Medicare Annual Well Visit with Jose Morales MD [facility] ([facility])              [facility]  [address] [place] [address] [place] [id number]  [phone]           Preferred lab: [facility] ([facility])     The patient has been notified to complete fasting labs prior to their physical appointment.

## 2024-04-12 NOTE — ASSESSMENT & PLAN NOTE
Cholesterol shows Good control. Long term heart-healthy diet and lifestyle discussed and encouraged to reduce risk of cardiovascular disease.  Cholesterol: 239, done on 4/9/2024.  HDL Cholesterol: 62, done on 4/9/2024.  TriGlycerides 135, done on 4/9/2024.  LDL Cholesterol: 153, done on 4/9/2024.   No current Cholesterol medication.

## 2024-04-12 NOTE — ASSESSMENT & PLAN NOTE
BP shows good control with last BP of 124/72. Continue lifestyle changes, diet, exercise and weight loss.   Last K was 4.3 done on 4/9/2024.  Last Cr was 0.82 done on 4/9/2024.  Last eGFR was 99 on 4/9/2024.  BP Meds: lisinopril Tabs - 10 MG

## 2024-04-18 ENCOUNTER — TELEPHONE (OUTPATIENT)
Dept: NEUROLOGY | Facility: CLINIC | Age: 64
End: 2024-04-18

## 2024-04-18 NOTE — TELEPHONE ENCOUNTER
Pt has an appt scheduled 4/29. Normally Provider reqs he complete BW before his appt, but there isn't an existing order. Please advise, Pt's best call back number is [phone]. Endorsed to RN for Provider.

## 2024-04-18 NOTE — TELEPHONE ENCOUNTER
Pt has appt on 4/29/2024 and is asking if he needs any labs completed prior to OV.    LOV on 10/23/2023 did not note future labs.    4/9/2024 recent labs include, CBC, CMP, TSH and Carbamazepine.    Routed to provider.

## 2024-04-25 ENCOUNTER — OFFICE VISIT (OUTPATIENT)
Dept: NUTRITION | Facility: HOSPITAL | Age: 64
End: 2024-04-25
Payer: MEDICARE

## 2024-04-25 PROCEDURE — 97802 MEDICAL NUTRITION INDIV IN: CPT

## 2024-04-25 NOTE — PROGRESS NOTES
ADULT OUTPATIENT NUTRITION FOLLOW UP        Nutrition Assessment     Number of consultations with dietitian: 3rd visit     Ht: 5'7\" Wt: 220# (todays weight, per pt) BMI: 34.5  GAIN of ~2# since visit, was up to 226#, then 222#  Weight at 4/15/24 MD visit noted at 221#  Per pt weight varies between 220-225#     Additional Labs since last consultation: (24) CHOL: 239, HDL: 62, LDL: 153, T, GLU: 96     Diet/Lifestyle Modifications Following Previous Nutrition Consultation (2 months)  -pt has been avoiding milk  -portion of juice now 5oz, not 14oz  -adding more veggies, likes salads  -increased water to 3 bottles/dy  -choosing wheat bread  -not adding salt     Food/Beverage Intake: skips breakfast but likes hard boiled eggs sometimes, avocado, chicken breast, chicken soups, salads, avoiding chips and sweets  Dinner: \"whatever wife is making\" grilled cheese sandwich w/h[place], frozen fries pot pie and fish sticks, tamales  Beverages: having only 5oz juice/day, diet soda 1x/wk, water     Meal Pattern:2 meals/d, 1 snacks/d     Physical Activity: not at goal d/t knee and ankle pain and back pain. Daily activity noted, aware to try strength training or bike, suggest tracking steps with goal of 7,000-10,000 steps/day     Diet Quality: Improved     Client attended appt with: spouse not able to attend today     Nutrition Intervention/Education: Comprehensive nutrition follow up education provided for weight loss management for recent fatty liver dx and elevated lipids. Pt reports making several changes as noted, avoiding 2% milk, limiting juice, watching labels, and adding more veggies (not on coumadin anymore). Pt mindful of food choices, however, suggested avoiding saturated fats. Spouse not ready to compromise on some cooking habits and ingredients. Pt is frustrated with lipid levels. Encouraged daily movement in addition to healthy food choices. Pt doing well with food choices, encouraged nutrient dense foods to  choose, omega 3's and portion control. Pt logging intake for this visit only, aware of some behavior modifications as well moving forward. Encouragement provided.      GOALS/PLAN:  1-activity 150+ min/wk  2-variety of nutrient dense foods/portion control  3-continue w increased veggies  4-increased water, decreased juice     Monitoring/Evaluation: Pt to follow with MD. Pt prefers to follow with RD in 2 months: will call for appt for over summer 2024.     Time spent with pt: 75 minutes     Thank you for the referral     Brenda Marcus RD, LDN  [email or web address]  P: [phone]

## 2024-04-29 ENCOUNTER — OFFICE VISIT (OUTPATIENT)
Dept: NEUROLOGY | Facility: CLINIC | Age: 64
End: 2024-04-29
Payer: MEDICARE

## 2024-04-29 VITALS
RESPIRATION RATE: 16 BRPM | WEIGHT: 223.63 LBS | DIASTOLIC BLOOD PRESSURE: 78 MMHG | SYSTOLIC BLOOD PRESSURE: 118 MMHG | BODY MASS INDEX: 35 KG/M2 | HEART RATE: 76 BPM

## 2024-04-29 DIAGNOSIS — G50.0 RIGHT TRIGEMINAL NEURALGIA: Primary | ICD-10-CM

## 2024-04-29 DIAGNOSIS — M85.80 OSTEOPENIA, UNSPECIFIED LOCATION: ICD-10-CM

## 2024-04-29 DIAGNOSIS — Z79.899 ON CARBAMAZEPINE THERAPY: ICD-10-CM

## 2024-04-29 PROCEDURE — 99214 OFFICE O/P EST MOD 30 MIN: CPT | Performed by: OTHER

## 2024-04-29 NOTE — PROGRESS NOTES
[facility] - [facility]   Neurology    Beau Hernandez Jr. Patient Status:  No patient class for patient encounter    1960 MRN [id number]   Location [facility], [facility] PCP Jose Morales MD              HPI:   Beau Hernandez Jr. is a(n) 63 year old male who presents at the request of Dr. Quiroz for evaluation of facial pain. It started 4-5 years ago. Spontaneous sharp brief pain for a few seconds, then again 7 months later. In the right lower jaw region. Another year went by, with the brief pain again. Started having increased sensitivity in the right lower jaw. In 2022 had a teeth cleaning, and within 1-2 days, developed an intermittent pulsing dull pain in that area. Then 3 weeks ago in 2022 while eating lunch, he developed sensitivity to food on his right lower molar. Persisted and tooth was sensitive to any mechanical pressure. Following morning woke up with 15/10 sharp pain in that one molar. Steady for 2 minutes, would subside 20 seconds, then pulse on and off. Subsided, but was still very sensitive. Any time he would touch the tooth, it would trigger the severe pain. Has pain with chewing, talking, and brushing. Saw a few dental specialists. At first thought it was traumatic occlusion/having to do with tooth height. Had tooth ground down, but symptoms persisted. Then had root canal done to remove the nerve leading to the nerve. No improvement. Also saw oral surgeon. Also had a block at the angle of the jaw, likely facial nerve, which did not improve his symptoms. Only had relief with anesthetic given to gum beneath the tooth. Had a panoramic CT scan which showed no structural lesions. Now has constant dull pain. With mechanical pressure, it is severe pain that lasts 2-3 minutes.   Interim  Pt states is doing better. Pt states does have concerns about side effects of medication Carbamazepine. Would like to go back down on medication. Initially 200mg BID was  helpful for the right lower facial pain, but then still noted intermittent sensitivity. I recommended increase to 300mg BID, which he did, and it helped significantly. He had covid and a LLE DVT, and was started on apixaban.   Interim  Facial pain is unchanged. Only occasionally gets a right facial twinge. Taking Calcium and Vitamin D.   Interim  Since last visit, patient reports no facial pain. Once a few months ago, had itching on the right facial region, for 20 minutes. Had Dexa scan. Reports gets sleepy during the day on CBZ. Taking 300mg BID      Pertinent imaging and laboratory work-up:   Component      Latest Ref Rng 4/9/2024   WBC      4.0 - 11.0 x10(3) uL 6.9    RBC      4.30 - 5.70 x10(6)uL 4.69    Hemoglobin      13.0 - 17.5 g/dL 15.0    Hematocrit      39.0 - 53.0 % 44.3    Platelet Count      150.0 - 450.0 10(3)uL 233.0    MCV      80.0 - 100.0 fL 94.5    MCH      26.0 - 34.0 pg 32.0    MCHC      31.0 - 37.0 g/dL 33.9    RDW      % 13.2        Component      Latest Ref Rng 4/9/2024   EGFR      >=60 mL/min/1.73m2 99    AST (SGOT)      15 - 37 U/L 15    ALT (SGPT)      16 - 61 U/L 29        Component      Latest Ref Rng 4/9/2024   Patient Fasting for CMP? Yes    HEMOGLOBIN A1c      <5.7 % 5.6    ESTIMATED AVERAGE GLUCOSE      68 - 126 mg/dL 114        10/2023 Dexa scan  CONCLUSION:  Osteopenia throughout      The World Health Organization has defined the following categories based on bone density:   Normal bone:  T-score better than -1   Osteopenia: T-score between -1 and -2.5   Osteoporosis:  T-score less than -2.5     8/2023- AST/ALT wnl, Cr 0.90  MRI/MRA head  MRA BRAIN   The upper cervical, petrous, cavernous, and supraclinoid internal carotid arteries are unremarkable.         An anterior communicating artery is seen.         The branches of the anterior cerebral and middle cerebral arteries are unremarkable.         A small bilateral posterior communicating arteries are seen.  The branches of the  posterior cerebral and superior cerebellar arteries are unremarkable.         The basilar artery has a normal course and caliber.         The bilateral vertebral arteries are unremarkable.            Impression   CONCLUSION:  Overall unremarkable MRI trigeminal protocol.            Component      Latest Ref Rng 1/26/2023 4/7/2023   CREATININE      0.70 - 1.30 mg/dL 0.95  0.90    CALCIUM      8.5 - 10.1 mg/dL 9.9  9.6    CALCULATED OSMOLALITY      275 - 295 mOsm/kg 287  286    eGFR-Cr      >=60 mL/min/1.73m2 90  97    AST (SGOT)      15 - 37 U/L 24  27    ALT (SGPT)      16 - 61 U/L 26  40    ALKALINE PHOSPHATASE      45 - 117 U/L 120 (H)  133 (H)    Total Bilirubin      0.1 - 2.0 mg/dL 0.4  0.4    PROTEIN, TOTAL      6.4 - 8.2 g/dL 7.1  7.2    Albumin      3.4 - 5.0 g/dL 3.6  3.5    Globulin      2.8 - 4.4 g/dL 3.5  3.7    A/G Ratio      1.0 - 2.0  1.0  0.9 (L)    Patient Fasting for CMP? Yes  Yes           Past Medical History:  Past Medical History:    Arthritis    Back pain    COVID    Essential hypertension    Plantar fasciitis    Rotator cuff syndrome, left    Wears glasses        Past Surgical History:  Past Surgical History:   Procedure Laterality Date    Colonoscopy      Colonoscopy with biopsy  7/19/2011    Cystoscopy,insert ureteral stent         Family History:  family history includes Asthma in his mother; Diabetes in his mother; Heart Attack in his maternal grandmother and paternal grandfather; Hypertension in his mother; Thyroid Disorder in his mother; cushing's syndrome in his sister; leon gehrig's disease in his maternal grandmother.      Social History:   reports that he has never smoked. He has never used smokeless tobacco. He reports that he does not currently use alcohol. He reports that he does not use drugs.    Allergies:  No Known Allergies    MEDICATIONS:    Current Outpatient Medications:     lisinopril 10 MG Oral Tab, TAKE 1 TABLET BY MOUTH  DAILY, Disp: 90 tablet, Rfl: 1    carBAMazepine  200 MG Oral Tab, TAKE 1& 1/2 TABLETS TWICE DAILY, Disp: 270 tablet, Rfl: 3    famotidine 40 MG Oral Tab, Take 1 tablet (40 mg total) by mouth daily., Disp: 90 tablet, Rfl: 3    ibuprofen 600 MG Oral Tab, Take 1 tablet (600 mg total) by mouth every 8 (eight) hours as needed for Pain., Disp: 90 tablet, Rfl: 3    Calcium Carbonate-Vitamin D 600-400 MG-UNIT Oral Tab, Take 1 tablet by mouth daily., Disp: , Rfl:     Cholecalciferol (VITAMIN D) 1000 UNITS Oral Cap, Take 2,000 Int'l Units/day by mouth daily.  , Disp: , Rfl:       Review of Systems:   A comprehensive 10 point review of systems was completed.     Pertinent positives and negatives noted in the HPI.         PHYSICAL EXAM:   Neurologic Exam  Vitals  Vitals:    04/29/24 1016   BP: 118/78   Pulse: 76   Resp: 16     General Appearance: Patient is a 63 year old male in no acute distress  Cardiac: Normal rate & regular rhythm  Skin: There are no rashes or other skin lesions.  Musculoskeletal: There is no scoliosis, or joint deformities  Neurologic examination:  Mental status: Patient is alert, attentive, and oriented x 3. Language is coherent and fluent without aphasia. Memory, comprehension and ability to follow commands were intact.   Cranial nerves II-XII: Optic discs were sharp. Pupils were round and reacted to light. Extraocular movements were full. There was no face, jaw, palate or tongue weakness or atrophy. Facial sensation was normal. Hearing was grossly intact. Shoulder shrug was normal.   Motor exam revealed normal muscle bulk and tone. No atrophy or fasciculations. Manual muscle testing revealed MRC grade 5/5 strength throughout including proximal and distal muscles of the arms and legs.  Deep tendon reflexes were 2 at the biceps, brachioradialis, triceps, knee jerk, and ankle jerk. Plantar responses were flexor bilaterally.   Sensory exam revealed normal light touch perception. Vibratory perception and proprioception were intact at the toes. Pinprick and  temperature were normal. Romberg sign was absent.  Complex motor skills revealed normal coordination. Finger-nose-finger intact.   Gait was narrow and stable, was able to walk on heels, toes and tandem without any difficulty.     ASSESSMENT/ACTIVE PROBLEM LIST:     Encounter Diagnoses   Name Primary?    Right trigeminal neuralgia Yes    On carbamazepine therapy     Osteopenia, unspecified location        Discussion/Plan:  Trigeminal neuralgia   Carbamazepine controlling facial pain, has side effect of mild tiredness if in relaxed situation such as watching TV  Discussed long term risk of osteoporosis, see below. Discussed other option of adding gabapenitn with evential goal wean CBZ but facial pain may come back and not be as effectively managed by gabapentin, possibility of  Carbamazepine  Lower to 300mg in the morning, and 200mg at night  Monitor INR as CBZ interacts with warfarin  Continue Vitamin D and calcium, dosing per PCP  Labs normal    Osteopenia-   Discussed that CBZ can increase the risk of osteoporosis  Patient discussed with PCP, and calcium and weight bearing exercises were recommended, and PCP will order Dexa scan in 1-2 years  Start doing weight bearing exercises  Need 1000mg of calcium daily, check Vitamin D level with Dr. Morales when appropriate     Requested Prescriptions      No prescriptions requested or ordered in this encounter          We discussed in depth regarding the diagnosis, prognosis, treatment. The patient was given ample opportunity to ask questions. All questions and concerns were addressed. 32 minutes were spent on this encounter, which included obtaining and reviewing history, examining the patient, reviewing and interpreting results, building a treatment plan, discussing treatment options, discussing medication instructions, educating the patient/family/caregiver, and completing documentation.       Jagruti Cordova,   Neuromuscular and General Neurology  [facility]

## 2024-04-29 NOTE — PROGRESS NOTES
Pt states he has questions about the carbamazepine. States it has helped the pain but does not like the drowsiness

## 2024-04-29 NOTE — PATIENT INSTRUCTIONS
Refill policies:    Allow 2-3 business days for refills; controlled substances may take longer.  Contact your pharmacy at least 5 days prior to running out of medication and have them send an electronic request or submit request through the “request refill” option in your [employer] account.  Refills are not addressed on weekends; covering physicians do not authorize routine medications on weekends.  No narcotics or controlled substances are refilled after noon on Fridays or by on call physicians.  By law, narcotics must be electronically prescribed.  A 30 day supply with no refills is the maximum allowed.  If your prescription is due for a refill, you may be due for a follow up appointment.  To best provide you care, patients receiving routine medications need to be seen at least once a year.  Patients receiving narcotic/controlled substance medications need to be seen at least once every 3 months.  In the event that your preferred pharmacy does not have the requested medication in stock (e.g. Backordered), it is your responsibility to find another pharmacy that has the requested medication available.  We will gladly send a new prescription to that pharmacy at your request.    Scheduling Tests:    If your physician has ordered radiology tests such as MRI or CT scans, please contact Central Scheduling at [phone] right away to schedule the test.  Once scheduled, the [facility] Centralized Referral Team will work with your insurance carrier to obtain pre-certification or prior authorization.  Depending on your insurance carrier, approval may take 3-10 days.  It is highly recommended patients assure they have received an authorization before having a test performed.  If test is done without insurance authorization, patient may be responsible for the entire amount billed.      Precertification and Prior Authorizations:  If your physician has recommended that you have a procedure or additional testing performed the [facility]  Centralized Referral Team will contact your insurance carrier to obtain pre-certification or prior authorization.    You are strongly encouraged to contact your insurance carrier to verify that your procedure/test has been approved and is a COVERED benefit.  Although the Count includes the [facility] Centralized Referral Team does its due diligence, the insurance carrier gives the disclaimer that \"Although the procedure is authorized, this does not guarantee payment.\"    Ultimately the patient is responsible for payment.   Thank you for your understanding in this matter.  Paperwork Completion:  If you require FMLA or disability paperwork for your recovery, please make sure to either drop it off or have it faxed to our office at [phone]. Be sure the form has your name and date of birth on it.  The form will be faxed to our Forms Department and they will complete it for you.  There is a 25$ fee for all forms that need to be filled out.  Please be aware there is a 10-14 day turnaround time.  You will need to sign a release of information (RONI) form if your paperwork does not come with one.  You may call the Forms Department with any questions at [phone].  Their fax number is [phone].        Carbamazepine  Lower to 200mg in the morning, and 300mg at night

## 2024-05-10 ENCOUNTER — OFFICE VISIT (OUTPATIENT)
Dept: FAMILY MEDICINE CLINIC | Facility: CLINIC | Age: 64
End: 2024-05-10
Payer: MEDICARE

## 2024-05-10 ENCOUNTER — LAB ENCOUNTER (OUTPATIENT)
Dept: LAB | Age: 64
End: 2024-05-10
Attending: FAMILY MEDICINE
Payer: MEDICARE

## 2024-05-10 VITALS
HEART RATE: 72 BPM | SYSTOLIC BLOOD PRESSURE: 110 MMHG | WEIGHT: 222 LBS | RESPIRATION RATE: 16 BRPM | HEIGHT: 67 IN | DIASTOLIC BLOOD PRESSURE: 70 MMHG | BODY MASS INDEX: 34.84 KG/M2

## 2024-05-10 DIAGNOSIS — R79.89 POSITIVE D DIMER: ICD-10-CM

## 2024-05-10 DIAGNOSIS — I10 ESSENTIAL HYPERTENSION: ICD-10-CM

## 2024-05-10 DIAGNOSIS — Z86.718 HISTORY OF DEEP VEIN THROMBOSIS (DVT) OF LOWER EXTREMITY: ICD-10-CM

## 2024-05-10 DIAGNOSIS — M79.604 RIGHT LEG PAIN: ICD-10-CM

## 2024-05-10 DIAGNOSIS — M79.604 RIGHT LEG PAIN: Primary | ICD-10-CM

## 2024-05-10 LAB
D DIMER PPP FEU-MCNC: 1.71 UG/ML FEU (ref ?–0.63)
INR BLD: 1.03 (ref 0.8–1.2)
PROTHROMBIN TIME: 13.5 SECONDS (ref 11.6–14.8)

## 2024-05-10 PROCEDURE — G2211 COMPLEX E/M VISIT ADD ON: HCPCS | Performed by: FAMILY MEDICINE

## 2024-05-10 PROCEDURE — 99214 OFFICE O/P EST MOD 30 MIN: CPT | Performed by: FAMILY MEDICINE

## 2024-05-10 PROCEDURE — 85610 PROTHROMBIN TIME: CPT

## 2024-05-10 PROCEDURE — 85379 FIBRIN DEGRADATION QUANT: CPT

## 2024-05-10 PROCEDURE — 36415 COLL VENOUS BLD VENIPUNCTURE: CPT

## 2024-05-10 RX ORDER — LISINOPRIL 10 MG/1
10 TABLET ORAL DAILY
Qty: 90 TABLET | Refills: 1 | Status: SHIPPED | OUTPATIENT
Start: 2024-05-10

## 2024-05-10 RX ORDER — WARFARIN SODIUM 5 MG/1
TABLET ORAL
Qty: 90 TABLET | Refills: 5 | Status: SHIPPED | OUTPATIENT
Start: 2024-05-10

## 2024-05-10 NOTE — PROGRESS NOTES
David is a 63 year old male coming in for had concerns including Leg Pain (Started after he got covid a year ago, Right leg calf is swollen and tender, no pain ).    Subjective:   HPI   Hx of blood clot on other leg.   5 days on right side (non clot leg) and no change in size, no redness but swellin nadn tnder justr above ankle and tender to touch.    Objective:   /70   Pulse 72   Resp 16   Ht 5' 7\" (1.702 m)   Wt 222 lb (100.7 kg)   BMI 34.77 kg/m²  Body mass index is 34.77 kg/m².   Physical Exam  Vitals and nursing note reviewed.   Constitutional:       General: He is not in acute distress.     Appearance: Normal appearance. He is well-developed.   HENT:      Head: Normocephalic and atraumatic.   Cardiovascular:      Rate and Rhythm: Normal rate and regular rhythm.      Pulses:           Posterior tibial pulses are 2+ on the right side and 2+ on the left side.      Heart sounds: Normal heart sounds. No murmur heard.  Pulmonary:      Effort: Pulmonary effort is normal. No respiratory distress.      Breath sounds: Normal breath sounds. No wheezing.   Abdominal:      General: Bowel sounds are normal.      Palpations: Abdomen is soft.      Tenderness: There is no abdominal tenderness.   Musculoskeletal:         General: Normal range of motion.      Cervical back: Normal range of motion.      Right lower leg: No edema.      Left lower leg: No edema.   Skin:     General: Skin is warm and dry.      Findings: No rash.   Neurological:      Mental Status: He is alert and oriented to person, place, and time.   Psychiatric:         Mood and Affect: Mood normal.         Behavior: Behavior normal.         Thought Content: Thought content normal.         Judgment: Judgment normal.     There is slight swelling and tenderness just above the ankle below the gastrocnemius muscle and a 3 x 2 area.  Warm but mildly tender with range of motion.    Assessment & Plan:   1. Right leg pain (Primary)  -     D-Dimer; Future;  Expected date: 05/10/2024  -     US VENOUS DOPPLER LEG RIGHT - DIAG IMG (CPT=93971); Future; Expected date: 05/10/2024  2. History of deep vein thrombosis (DVT) of lower extremity  -     D-Dimer; Future; Expected date: 05/10/2024  -     US VENOUS DOPPLER LEG RIGHT - DIAG IMG (CPT=93971); Future; Expected date: 05/10/2024  -     Prothrombin Time (PT); Standing  -     Prothrombin Time (PT); Future; Expected date: 05/10/2024  3. Essential hypertension  Overview:  Lisinopril 10  Orders:  -     Lisinopril; Take 1 tablet (10 mg total) by mouth daily.  Dispense: 90 tablet; Refill: 1  4. Positive D dimer  -     US VENOUS DOPPLER LEG RIGHT - DIAG IMG (CPT=93971); Future; Expected date: 05/10/2024  -     Prothrombin Time (PT); Standing  -     Prothrombin Time (PT); Future; Expected date: 05/10/2024  Other orders  -     Warfarin Sodium; 3 tabsl nightly for 3 nights then 2.5 tabs (12.5 mg) alt with 15 mg (3 tabs) on a nightly basis  Dispense: 90 tablet; Refill: 5  D-dimer is positive.  High likelihood of blood clot.  I am starting him on warfarin.  I discussed with him over the phone after the visit when results are back we will arrange for an ultrasound to be done as soon as possible but will start warfarin tonight.  He was on 10 alternating with 12.5 last fall.  We will start at 15 and then cut to the lower dose after 3 to 4 days.  INR added to today's blood test.  He will likely need to follow-up with hematology to figure out how long this needs to be.  I did discuss Lovenox and he is very nervous about starting that.  He did tolerate the warfarin so I am not concerned about adverse reactions and it looks to be a very small early symptoms so we will await findings.  I have changed David Hernandez Jr.'s lisinopril. I am also having him start on warfarin. Additionally, I am having him maintain his Vitamin D, Calcium Carbonate-Vitamin D, ibuprofen, famotidine, and carBAMazepine.       No follow-ups on file.     Check d dimer, if  positive, Lovenox  and warfarin because of tegrotol blockking other anticoagulats.

## 2024-05-13 ENCOUNTER — HOSPITAL ENCOUNTER (OUTPATIENT)
Dept: ULTRASOUND IMAGING | Facility: HOSPITAL | Age: 64
Discharge: HOME | End: 2024-05-13
Attending: FAMILY MEDICINE

## 2024-05-13 DIAGNOSIS — M79.604 RIGHT LEG PAIN: ICD-10-CM

## 2024-05-13 DIAGNOSIS — Z86.718 HISTORY OF DEEP VEIN THROMBOSIS (DVT) OF LOWER EXTREMITY: ICD-10-CM

## 2024-05-13 DIAGNOSIS — R79.89 POSITIVE D DIMER: ICD-10-CM

## 2024-05-15 ENCOUNTER — ANTI-COAG VISIT (OUTPATIENT)
Dept: FAMILY MEDICINE CLINIC | Facility: CLINIC | Age: 64
End: 2024-05-15

## 2024-05-15 ENCOUNTER — LAB ENCOUNTER (OUTPATIENT)
Dept: LAB | Age: 64
End: 2024-05-15
Attending: FAMILY MEDICINE

## 2024-05-15 DIAGNOSIS — Z86.718 HISTORY OF DEEP VEIN THROMBOSIS (DVT) OF LOWER EXTREMITY: ICD-10-CM

## 2024-05-15 DIAGNOSIS — I82.432 ACUTE DEEP VEIN THROMBOSIS (DVT) OF POPLITEAL VEIN OF LEFT LOWER EXTREMITY (HCC): Primary | ICD-10-CM

## 2024-05-15 DIAGNOSIS — R79.89 POSITIVE D DIMER: ICD-10-CM

## 2024-05-15 LAB
INR BLD: 2.8 (ref 0.8–1.2)
PROTHROMBIN TIME: 29.9 SECONDS (ref 11.6–14.8)

## 2024-05-15 PROCEDURE — 36415 COLL VENOUS BLD VENIPUNCTURE: CPT

## 2024-05-15 PROCEDURE — 85610 PROTHROMBIN TIME: CPT

## 2024-05-15 NOTE — PROGRESS NOTES
On 10 mg alternating with 12.5, continue this dose and recheck in 1 weeks. Jose Morales MD 5/15/2024

## 2024-05-22 ENCOUNTER — LAB ENCOUNTER (OUTPATIENT)
Dept: LAB | Age: 64
End: 2024-05-22
Attending: FAMILY MEDICINE

## 2024-05-22 ENCOUNTER — ANTI-COAG VISIT (OUTPATIENT)
Dept: FAMILY MEDICINE CLINIC | Facility: CLINIC | Age: 64
End: 2024-05-22

## 2024-05-22 DIAGNOSIS — Z86.718 HISTORY OF DEEP VEIN THROMBOSIS (DVT) OF LOWER EXTREMITY: ICD-10-CM

## 2024-05-22 DIAGNOSIS — R79.89 POSITIVE D DIMER: ICD-10-CM

## 2024-05-22 DIAGNOSIS — I82.432 ACUTE DEEP VEIN THROMBOSIS (DVT) OF POPLITEAL VEIN OF LEFT LOWER EXTREMITY (HCC): Primary | ICD-10-CM

## 2024-05-22 LAB
INR BLD: 3.26 (ref 0.8–1.2)
PROTHROMBIN TIME: 33.7 SECONDS (ref 11.6–14.8)

## 2024-05-22 PROCEDURE — 85610 PROTHROMBIN TIME: CPT

## 2024-05-22 PROCEDURE — 36415 COLL VENOUS BLD VENIPUNCTURE: CPT

## 2024-05-22 NOTE — PROGRESS NOTES
On 10 mg alternating with 12.5, switch to 10 mg most days, 12/5 om Tuesday and  Saturday and recheck in 1 weeks. Jose Morales MD 5/15/2024

## 2024-05-23 NOTE — PROGRESS NOTES
Spoke with David tellling him to take Warfarin 10mg most days and 12.5mg on Tuesday and Saturday per  order. Repeat testing in one week. David verbalized understanding.

## 2024-05-29 ENCOUNTER — LAB ENCOUNTER (OUTPATIENT)
Dept: LAB | Age: 64
End: 2024-05-29
Attending: FAMILY MEDICINE

## 2024-05-29 ENCOUNTER — ANTI-COAG VISIT (OUTPATIENT)
Dept: FAMILY MEDICINE CLINIC | Facility: CLINIC | Age: 64
End: 2024-05-29

## 2024-05-29 DIAGNOSIS — Z86.718 HISTORY OF DEEP VEIN THROMBOSIS (DVT) OF LOWER EXTREMITY: ICD-10-CM

## 2024-05-29 DIAGNOSIS — R79.89 POSITIVE D DIMER: ICD-10-CM

## 2024-05-29 DIAGNOSIS — I82.432 ACUTE DEEP VEIN THROMBOSIS (DVT) OF POPLITEAL VEIN OF LEFT LOWER EXTREMITY (HCC): Primary | ICD-10-CM

## 2024-05-29 LAB
INR BLD: 2.64 (ref 0.8–1.2)
PROTHROMBIN TIME: 28.5 SECONDS (ref 11.6–14.8)

## 2024-05-29 PROCEDURE — 85610 PROTHROMBIN TIME: CPT

## 2024-05-29 PROCEDURE — 36415 COLL VENOUS BLD VENIPUNCTURE: CPT

## 2024-05-29 NOTE — PROGRESS NOTES
On 10 mg most days, 12/5 om Tuesday and  Saturday, continue and recheck in 2 weeks. Jose Morales MD 5/29/2024

## 2024-06-12 ENCOUNTER — ANTI-COAG VISIT (OUTPATIENT)
Dept: FAMILY MEDICINE CLINIC | Facility: CLINIC | Age: 64
End: 2024-06-12

## 2024-06-12 ENCOUNTER — LAB ENCOUNTER (OUTPATIENT)
Dept: LAB | Age: 64
End: 2024-06-12
Attending: FAMILY MEDICINE
Payer: MEDICARE

## 2024-06-12 DIAGNOSIS — R79.89 POSITIVE D DIMER: ICD-10-CM

## 2024-06-12 DIAGNOSIS — Z86.718 HISTORY OF DEEP VEIN THROMBOSIS (DVT) OF LOWER EXTREMITY: ICD-10-CM

## 2024-06-12 DIAGNOSIS — I82.432 ACUTE DEEP VEIN THROMBOSIS (DVT) OF POPLITEAL VEIN OF LEFT LOWER EXTREMITY (HCC): Primary | ICD-10-CM

## 2024-06-12 LAB
INR BLD: 3 (ref 0.8–1.2)
PROTHROMBIN TIME: 31.6 SECONDS (ref 11.6–14.8)

## 2024-06-12 PROCEDURE — 85610 PROTHROMBIN TIME: CPT

## 2024-06-12 PROCEDURE — 36415 COLL VENOUS BLD VENIPUNCTURE: CPT

## 2024-06-12 NOTE — PROGRESS NOTES
On 10 mg most days, 12.5 om Tuesday and  Saturday, continue and recheck in 2 weeks. Jose Morales MD 6/12/2024

## 2024-06-13 NOTE — PROGRESS NOTES
Called and LMOM in detail but asked him to call back to confirm he got the message about his INR.

## 2024-06-26 ENCOUNTER — LAB ENCOUNTER (OUTPATIENT)
Dept: LAB | Age: 64
End: 2024-06-26
Attending: FAMILY MEDICINE

## 2024-06-26 ENCOUNTER — ANTI-COAG VISIT (OUTPATIENT)
Dept: FAMILY MEDICINE CLINIC | Facility: CLINIC | Age: 64
End: 2024-06-26

## 2024-06-26 DIAGNOSIS — Z86.718 HISTORY OF DEEP VEIN THROMBOSIS (DVT) OF LOWER EXTREMITY: ICD-10-CM

## 2024-06-26 DIAGNOSIS — I82.432 ACUTE DEEP VEIN THROMBOSIS (DVT) OF POPLITEAL VEIN OF LEFT LOWER EXTREMITY (HCC): Primary | ICD-10-CM

## 2024-06-26 DIAGNOSIS — R79.89 POSITIVE D DIMER: ICD-10-CM

## 2024-06-26 LAB
INR BLD: 1.92 (ref 0.8–1.2)
PROTHROMBIN TIME: 22.1 SECONDS (ref 11.6–14.8)

## 2024-06-26 PROCEDURE — 36415 COLL VENOUS BLD VENIPUNCTURE: CPT

## 2024-06-26 PROCEDURE — 85610 PROTHROMBIN TIME: CPT

## 2024-06-26 NOTE — PROGRESS NOTES
Called and talked to patient and told him to cotinue the current dose and then recheck on 7/3/24 before the holiday.

## 2024-06-26 NOTE — PROGRESS NOTES
On 10 mg most days, 12.5 om Tuesday and  Saturday, continue and recheck in 1 weeks. Jose Morales MD 6/26/2024      Please notify.  Levels a little low we will keep the same dose but just check back either Tuesday or Wednesday of next week before 4 July

## 2024-07-03 ENCOUNTER — LAB ENCOUNTER (OUTPATIENT)
Dept: LAB | Age: 64
End: 2024-07-03
Attending: FAMILY MEDICINE
Payer: MEDICARE

## 2024-07-03 ENCOUNTER — ANTI-COAG VISIT (OUTPATIENT)
Dept: FAMILY MEDICINE CLINIC | Facility: CLINIC | Age: 64
End: 2024-07-03

## 2024-07-03 DIAGNOSIS — R79.89 POSITIVE D DIMER: ICD-10-CM

## 2024-07-03 DIAGNOSIS — I82.432 ACUTE DEEP VEIN THROMBOSIS (DVT) OF POPLITEAL VEIN OF LEFT LOWER EXTREMITY (HCC): Primary | ICD-10-CM

## 2024-07-03 DIAGNOSIS — Z86.718 HISTORY OF DEEP VEIN THROMBOSIS (DVT) OF LOWER EXTREMITY: ICD-10-CM

## 2024-07-03 LAB
INR BLD: 1.76 (ref 0.8–1.2)
PROTHROMBIN TIME: 20.7 SECONDS (ref 11.6–14.8)

## 2024-07-03 PROCEDURE — 36415 COLL VENOUS BLD VENIPUNCTURE: CPT

## 2024-07-03 PROCEDURE — 85610 PROTHROMBIN TIME: CPT

## 2024-07-03 NOTE — PROGRESS NOTES
On 10 mg most days, 12.5 om Tuesday and  Saturday, switch to 12.5 alt with 10, repeat 1 week. Jose Morales MD 7/3/2024

## 2024-07-05 NOTE — PROGRESS NOTES
Called and talked to wife gave her the results and the new instructions on alternating coumadin 12.5 gm then 10 mg then 12.5 mg for next week then recheck INR.

## 2024-07-10 ENCOUNTER — LAB ENCOUNTER (OUTPATIENT)
Dept: LAB | Age: 64
End: 2024-07-10
Attending: FAMILY MEDICINE
Payer: MEDICARE

## 2024-07-10 ENCOUNTER — ANTI-COAG VISIT (OUTPATIENT)
Dept: FAMILY MEDICINE CLINIC | Facility: CLINIC | Age: 64
End: 2024-07-10

## 2024-07-10 DIAGNOSIS — Z86.718 HISTORY OF DEEP VEIN THROMBOSIS (DVT) OF LOWER EXTREMITY: ICD-10-CM

## 2024-07-10 DIAGNOSIS — R79.89 POSITIVE D DIMER: ICD-10-CM

## 2024-07-10 DIAGNOSIS — I82.432 ACUTE DEEP VEIN THROMBOSIS (DVT) OF POPLITEAL VEIN OF LEFT LOWER EXTREMITY (HCC): Primary | ICD-10-CM

## 2024-07-10 LAB
INR BLD: 2.04 (ref 0.8–1.2)
PROTHROMBIN TIME: 23.2 SECONDS (ref 11.6–14.8)

## 2024-07-10 PROCEDURE — 85610 PROTHROMBIN TIME: CPT

## 2024-07-10 PROCEDURE — 36415 COLL VENOUS BLD VENIPUNCTURE: CPT

## 2024-07-11 NOTE — PROGRESS NOTES
Subjective:   Patient ID: Beau Hernandez Jr. is a 64 year old male.    Results reviewed with Pt    History/Other:   Review of Systems  Current Outpatient Medications   Medication Sig Dispense Refill   • lisinopril 10 MG Oral Tab Take 1 tablet (10 mg total) by mouth daily. 90 tablet 1   • warfarin 5 MG Oral Tab 3 tabsl nightly for 3 nights then 2.5 tabs (12.5 mg) alt with 15 mg (3 tabs) on a nightly basis 90 tablet 5   • carBAMazepine 200 MG Oral Tab TAKE 1& 1/2 TABLETS TWICE DAILY 270 tablet 3   • famotidine 40 MG Oral Tab Take 1 tablet (40 mg total) by mouth daily. 90 tablet 3   • ibuprofen 600 MG Oral Tab Take 1 tablet (600 mg total) by mouth every 8 (eight) hours as needed for Pain. 90 tablet 3   • Calcium Carbonate-Vitamin D 600-400 MG-UNIT Oral Tab Take 1 tablet by mouth daily.     • Cholecalciferol (VITAMIN D) 1000 UNITS Oral Cap Take 2,000 Int'l Units/day by mouth daily.         Allergies:No Known Allergies    Objective:   Physical Exam    Assessment & Plan:   1. Acute deep vein thrombosis (DVT) of popliteal vein of left lower extremity (HCC)        No orders of the defined types were placed in this encounter.      Meds This Visit:  Requested Prescriptions      No prescriptions requested or ordered in this encounter       Imaging & Referrals:  None

## 2024-07-24 ENCOUNTER — LAB ENCOUNTER (OUTPATIENT)
Dept: LAB | Age: 64
End: 2024-07-24
Attending: FAMILY MEDICINE
Payer: MEDICARE

## 2024-07-24 LAB
INR BLD: 2.32 (ref 0.8–1.2)
PROTHROMBIN TIME: 25.8 SECONDS (ref 11.6–14.8)

## 2024-07-28 ENCOUNTER — ANTI-COAG VISIT (OUTPATIENT)
Dept: FAMILY MEDICINE CLINIC | Facility: CLINIC | Age: 64
End: 2024-07-28

## 2024-07-28 DIAGNOSIS — I82.432 ACUTE DEEP VEIN THROMBOSIS (DVT) OF POPLITEAL VEIN OF LEFT LOWER EXTREMITY (HCC): Primary | ICD-10-CM

## 2024-07-29 ENCOUNTER — TELEPHONE (OUTPATIENT)
Dept: FAMILY MEDICINE CLINIC | Facility: CLINIC | Age: 64
End: 2024-07-29

## 2024-07-29 DIAGNOSIS — Z86.718 HISTORY OF RECURRENT DEEP VEIN THROMBOSIS (DVT): Primary | ICD-10-CM

## 2024-07-29 NOTE — TELEPHONE ENCOUNTER
Pt had questions as to why Anticoagulation notes say that on Coumadin for L leg DVT. Currently has in R leg. Explained to Pt that he has a HX of DVT on L leg and that was why he was put on medication initially. This was the DX attached to Coumadin and INR results      Pt also saw an error on result note from 5/10/24- stating will get a US of LLE but Pt was getting it done on RLE  Can you change or add to results note from 5/10/24

## 2024-07-29 NOTE — TELEPHONE ENCOUNTER
Pt is calling because his chart says he is currently being treated for blood clot on his lower right leg but in  his recent notes and AVS its says left leg and he would like this corrected

## 2024-07-30 ENCOUNTER — TELEPHONE (OUTPATIENT)
Dept: FAMILY MEDICINE CLINIC | Facility: CLINIC | Age: 64
End: 2024-07-30

## 2024-07-30 PROBLEM — Z79.01 LONG TERM (CURRENT) USE OF ANTICOAGULANTS: Status: ACTIVE | Noted: 2024-07-30

## 2024-07-30 PROBLEM — Z86.718 HISTORY OF RECURRENT DEEP VEIN THROMBOSIS (DVT): Status: ACTIVE | Noted: 2024-07-30

## 2024-07-30 NOTE — TELEPHONE ENCOUNTER
1. History of recurrent deep vein thrombosis (DVT) (Primary)  Overview:  Left LE in 2023, again in 2024. Warfarin as carbamazepine  interfered with xarelto and eliquis.        OK to notify. Thanks, Nahid Morales MD     OK, updated notes and DIAGNOSIS added going forward.

## 2024-08-23 ENCOUNTER — HOSPITAL ENCOUNTER (OUTPATIENT)
Dept: ULTRASOUND IMAGING | Age: 64
Discharge: HOME | End: 2024-08-23
Attending: FAMILY MEDICINE
Payer: MEDICARE

## 2024-08-23 DIAGNOSIS — K82.4 GALLBLADDER POLYP: ICD-10-CM

## 2024-08-23 DIAGNOSIS — K76.89 LIVER CYST: ICD-10-CM

## 2024-08-23 DIAGNOSIS — K76.0 FATTY LIVER: ICD-10-CM

## 2024-08-23 PROCEDURE — 76700 US EXAM ABDOM COMPLETE: CPT | Performed by: FAMILY MEDICINE

## 2024-08-26 ENCOUNTER — ANTI-COAG VISIT (OUTPATIENT)
Dept: FAMILY MEDICINE CLINIC | Facility: CLINIC | Age: 64
End: 2024-08-26

## 2024-08-26 ENCOUNTER — LAB ENCOUNTER (OUTPATIENT)
Dept: LAB | Age: 64
End: 2024-08-26
Attending: RADIOLOGY
Payer: MEDICARE

## 2024-08-26 DIAGNOSIS — I82.432 ACUTE DEEP VEIN THROMBOSIS (DVT) OF POPLITEAL VEIN OF LEFT LOWER EXTREMITY (HCC): Primary | ICD-10-CM

## 2024-08-26 DIAGNOSIS — Z79.01 LONG TERM (CURRENT) USE OF ANTICOAGULANTS: ICD-10-CM

## 2024-08-26 DIAGNOSIS — Z86.718 HISTORY OF DEEP VEIN THROMBOSIS (DVT) OF LOWER EXTREMITY: ICD-10-CM

## 2024-08-26 DIAGNOSIS — R79.89 POSITIVE D DIMER: ICD-10-CM

## 2024-08-26 DIAGNOSIS — Z86.718 HISTORY OF RECURRENT DEEP VEIN THROMBOSIS (DVT): ICD-10-CM

## 2024-08-26 LAB
INR BLD: 2.5 (ref 0.8–1.2)
PROTHROMBIN TIME: 27.3 SECONDS (ref 11.6–14.8)

## 2024-08-26 PROCEDURE — 85610 PROTHROMBIN TIME: CPT

## 2024-08-26 PROCEDURE — 36415 COLL VENOUS BLD VENIPUNCTURE: CPT

## 2024-08-27 NOTE — PROGRESS NOTES
Called and told patient of the results and recommendations to continue same dose and repeat in 2 weeks.

## 2024-08-30 DIAGNOSIS — I10 ESSENTIAL HYPERTENSION: ICD-10-CM

## 2024-08-31 RX ORDER — LISINOPRIL 10 MG/1
10 TABLET ORAL DAILY
Qty: 90 TABLET | Refills: 3 | Status: SHIPPED | OUTPATIENT
Start: 2024-08-31

## 2024-09-04 DIAGNOSIS — I82.432 ACUTE DEEP VEIN THROMBOSIS (DVT) OF POPLITEAL VEIN OF LEFT LOWER EXTREMITY (HCC): Primary | ICD-10-CM

## 2024-09-04 RX ORDER — WARFARIN SODIUM 5 MG/1
TABLET ORAL
Qty: 90 TABLET | Refills: 1 | Status: SHIPPED | OUTPATIENT
Start: 2024-09-04 | End: 2024-09-06

## 2024-09-04 NOTE — TELEPHONE ENCOUNTER
Requested Prescriptions     Pending Prescriptions Disp Refills    warfarin 5 MG Oral Tab 90 tablet 1     Sig: Alternating 10mg and 12.5mg     LOV 5/10/2024     Patient was asked to follow-up in: 6 months    Appointment scheduled: 10/16/2024 Jose Morales MD     Please advise on refill request

## 2024-09-06 RX ORDER — WARFARIN SODIUM 5 MG/1
TABLET ORAL
Qty: 150 TABLET | Refills: 5 | Status: SHIPPED | OUTPATIENT
Start: 2024-09-06

## 2024-09-09 ENCOUNTER — ANTI-COAG VISIT (OUTPATIENT)
Dept: FAMILY MEDICINE CLINIC | Facility: CLINIC | Age: 64
End: 2024-09-09

## 2024-09-09 ENCOUNTER — LAB ENCOUNTER (OUTPATIENT)
Dept: LAB | Age: 64
End: 2024-09-09
Attending: FAMILY MEDICINE
Payer: MEDICARE

## 2024-09-09 DIAGNOSIS — R79.89 POSITIVE D DIMER: ICD-10-CM

## 2024-09-09 DIAGNOSIS — I82.432 ACUTE DEEP VEIN THROMBOSIS (DVT) OF POPLITEAL VEIN OF LEFT LOWER EXTREMITY (HCC): Primary | ICD-10-CM

## 2024-09-09 DIAGNOSIS — Z86.718 HISTORY OF RECURRENT DEEP VEIN THROMBOSIS (DVT): ICD-10-CM

## 2024-09-09 DIAGNOSIS — Z79.01 LONG TERM (CURRENT) USE OF ANTICOAGULANTS: ICD-10-CM

## 2024-09-09 DIAGNOSIS — Z86.718 HISTORY OF DEEP VEIN THROMBOSIS (DVT) OF LOWER EXTREMITY: ICD-10-CM

## 2024-09-09 LAB
INR BLD: 2.07 (ref 0.8–1.2)
PROTHROMBIN TIME: 23.5 SECONDS (ref 11.6–14.8)

## 2024-09-09 PROCEDURE — 36415 COLL VENOUS BLD VENIPUNCTURE: CPT

## 2024-09-09 PROCEDURE — 85610 PROTHROMBIN TIME: CPT

## 2024-09-19 ENCOUNTER — TELEPHONE (OUTPATIENT)
Dept: FAMILY MEDICINE CLINIC | Facility: CLINIC | Age: 64
End: 2024-09-19

## 2024-09-19 NOTE — TELEPHONE ENCOUNTER
[facility] has refill but med was last filled on 9/4/24 #270 (90 day) through [email or web address].  Explained to pt should have enough meds till 12/2024.  Pt read be bottle and its does say 270 pills but Pt states does not have that may pills. Advised Pt to look around for another bottle and to discuss with Bev about what was dispensed

## 2024-09-19 NOTE — TELEPHONE ENCOUNTER
Pt is calling [facility] is telling the patient that they never received the refill on 9/6/24. Please call [facility].

## 2024-09-23 ENCOUNTER — LAB ENCOUNTER (OUTPATIENT)
Dept: LAB | Age: 64
End: 2024-09-23
Attending: FAMILY MEDICINE
Payer: MEDICARE

## 2024-09-23 ENCOUNTER — ANTI-COAG VISIT (OUTPATIENT)
Dept: FAMILY MEDICINE CLINIC | Facility: CLINIC | Age: 64
End: 2024-09-23

## 2024-09-23 DIAGNOSIS — R79.89 POSITIVE D DIMER: ICD-10-CM

## 2024-09-23 DIAGNOSIS — Z79.01 LONG TERM (CURRENT) USE OF ANTICOAGULANTS: ICD-10-CM

## 2024-09-23 DIAGNOSIS — Z86.718 HISTORY OF DEEP VEIN THROMBOSIS (DVT) OF LOWER EXTREMITY: ICD-10-CM

## 2024-09-23 DIAGNOSIS — I82.432 ACUTE DEEP VEIN THROMBOSIS (DVT) OF POPLITEAL VEIN OF LEFT LOWER EXTREMITY (HCC): Primary | ICD-10-CM

## 2024-09-23 DIAGNOSIS — Z86.718 HISTORY OF RECURRENT DEEP VEIN THROMBOSIS (DVT): ICD-10-CM

## 2024-09-23 LAB
INR BLD: 2.41 (ref 0.8–1.2)
PROTHROMBIN TIME: 26.5 SECONDS (ref 11.6–14.8)

## 2024-09-23 PROCEDURE — 36415 COLL VENOUS BLD VENIPUNCTURE: CPT

## 2024-09-23 PROCEDURE — 85610 PROTHROMBIN TIME: CPT

## 2024-09-26 NOTE — TELEPHONE ENCOUNTER
MRI brain is normal. No mass compressing the nerve. Treatment recommendations are the same, and if not having any issues with carbamazepine, should continue this, and call if needed for adjustment. Also will need the labs that I recommended in note.
Patient states the carbamazepine is working. He states he is currently doing the 1 full pill twice a day. Patient advised to obtain the lab work.
Per Epic review:    -pt imaging was completed 1/24/23  -with imaging recently being completed as noted above, still needs to be reviewed by provider. Will send to provider to review but will also sent [employer] message to pt with update as well.
Pt would like to speak with someone regarding his MRI results.  Please advise
Statement Selected

## 2024-10-02 ENCOUNTER — OFFICE VISIT (OUTPATIENT)
Facility: LOCATION | Age: 64
End: 2024-10-02
Payer: MEDICARE

## 2024-10-02 VITALS
TEMPERATURE: 98 F | OXYGEN SATURATION: 98 % | HEIGHT: 67.5 IN | WEIGHT: 222 LBS | BODY MASS INDEX: 34.44 KG/M2 | SYSTOLIC BLOOD PRESSURE: 111 MMHG | HEART RATE: 66 BPM | DIASTOLIC BLOOD PRESSURE: 70 MMHG

## 2024-10-02 DIAGNOSIS — K82.4 GALLBLADDER POLYP: Primary | ICD-10-CM

## 2024-10-02 PROCEDURE — 99204 OFFICE O/P NEW MOD 45 MIN: CPT | Performed by: SURGERY

## 2024-10-02 NOTE — H&P
New Patient Visit Note       Active Problems      1. Gallbladder polyp        Chief Complaint   Chief Complaint   Patient presents with    New Patient     NP - polyp found on gallbladder, growing in size, US done at [facility] 8/23, no symptoms.         History of Present Illness     Patient presents at the request of his primary care physician for evaluation of gallbladder polyp.  The patient has known history of gallbladder polyp and recent ultrasound shows increase in size to approximately 10 mm.  Prior ultrasounds revealed gallbladder polyp measuring 5 then 6 mm.  I reviewed the images and I concur with the interpretation.  I discussed findings with patient and his spouse in context of his presenting symptoms.  Based on criteria of size and growth over time, the patient meets criteria for cholecystectomy.    The patient denies fever, chills, chest pain, shortness of breath, dyspnea. The patient also denies hematemesis, melena, or hematochezia. The patient denies change in bowel or bladder habits. There is no complaint of hematuria or dysuria.    I discussed the risk, benefits, alternatives of surgery.  I discussed the anticipated postoperative recovery including dietary, activity, exercise, and lifestyle recommendations.  The patient's questions regarding surgical procedure were answered and the patient voiced understanding of the care plan.    Allergies  Beau has No Known Allergies.    Past Medical / Surgical / Social / Family History    The past medical and past surgical history have been reviewed by me today.    Past Medical History:    Arthritis    Back pain    COVID    Essential hypertension    High blood pressure    I take Lisinpril 10mg once daily. Dr Morales told me about 10 years ago that I could stop taking it. He says that there are other good reasons to continue with it!    Osteoarthritis    Both knees due to occupation.    Plantar fasciitis    Rotator cuff syndrome, left    Sleep apnea    Wife says I  snore and have it.    Wears glasses     Past Surgical History:   Procedure Laterality Date    Colonoscopy      Colonoscopy with biopsy  7/19/2011    Cystoscopy,insert ureteral stent         The family history and social history have been reviewed by me today.    Family History   Problem Relation Age of Onset    Heart Attack Maternal Grandmother     Other (leon gehrig's disease) Maternal Grandmother     Heart Attack Paternal Grandfather     Asthma Mother     Diabetes Mother         Mom is pre-diabetic.    Hypertension Mother     Thyroid Disorder Mother     Other (cushing's syndrome) Sister      Social History     Socioeconomic History    Marital status:    Occupational History    Occupation: Damage Prevention    Tobacco Use    Smoking status: Never    Smokeless tobacco: Never    Tobacco comments:     NONE   Vaping Use    Vaping status: Never Used   Substance and Sexual Activity    Alcohol use: Never     Comment: Modestly 1-3 beers monthly or none!    Drug use: Never   Other Topics Concern    Caffeine Concern No    Stress Concern No    Weight Concern Yes     Comment: I have been seeing a Dietitian at [facility].    Special Diet No    Exercise Yes    Seat Belt Yes        Current Outpatient Medications:     warfarin 5 MG Oral Tab, Take 2 tablets (10 mg total) by mouth every other day AND 2.5 tablets (12.5 mg total) every other day., Disp: 150 tablet, Rfl: 5    LISINOPRIL 10 MG Oral Tab, TAKE 1 TABLET BY MOUTH DAILY, Disp: 90 tablet, Rfl: 3    carBAMazepine 200 MG Oral Tab, TAKE 1& 1/2 TABLETS TWICE DAILY, Disp: 270 tablet, Rfl: 3    famotidine 40 MG Oral Tab, Take 1 tablet (40 mg total) by mouth daily., Disp: 90 tablet, Rfl: 3    ibuprofen 600 MG Oral Tab, Take 1 tablet (600 mg total) by mouth every 8 (eight) hours as needed for Pain., Disp: 90 tablet, Rfl: 3    Calcium Carbonate-Vitamin D 600-400 MG-UNIT Oral Tab, Take 1 tablet by mouth daily., Disp: , Rfl:     Cholecalciferol (VITAMIN D) 1000 UNITS Oral  Cap, Take 2,000 Int'l Units/day by mouth daily.  , Disp: , Rfl:       Review of Systems  The Review of Systems has been reviewed by me during today.  Review of Systems   Constitutional: Negative.    HENT: Negative.     Eyes: Negative.    Respiratory: Negative.     Cardiovascular: Negative.    Gastrointestinal: Negative.    Genitourinary: Negative.    Musculoskeletal: Negative.    Skin: Negative.    Neurological: Negative.    Psychiatric/Behavioral: Negative.         Physical Findings   /70 (BP Location: Left arm, Patient Position: Sitting, Cuff Size: adult)   Pulse 66   Temp 98 °F (36.7 °C) (Temporal)   Ht 67.5\"   Wt 222 lb (100.7 kg)   SpO2 98%   BMI 34.26 kg/m²   Physical Exam  Vitals and nursing note reviewed.   Constitutional:       General: He is not in acute distress.     Appearance: He is well-developed.   HENT:      Head: Normocephalic and atraumatic.   Eyes:      General: No scleral icterus.     Pupils: Pupils are equal, round, and reactive to light.   Neck:      Vascular: No JVD.      Trachea: No tracheal deviation.   Cardiovascular:      Rate and Rhythm: Normal rate and regular rhythm.   Pulmonary:      Effort: Pulmonary effort is normal. No respiratory distress.      Breath sounds: No stridor.   Abdominal:      General: Bowel sounds are normal. There is no distension.      Palpations: Abdomen is soft. Abdomen is not rigid. There is no mass.      Tenderness: There is no abdominal tenderness. There is no guarding or rebound. Negative signs include Edwards's sign and McBurney's sign.      Hernia: No hernia is present.   Musculoskeletal:         General: Normal range of motion.      Cervical back: Normal range of motion and neck supple.   Skin:     General: Skin is warm and dry.   Neurological:      Mental Status: He is alert and oriented to person, place, and time.   Psychiatric:         Behavior: Behavior normal.             Assessment   1. Gallbladder polyp          Plan     The patient will be  scheduled for laparoscopic cholecystectomy with ICG imaging.    The ramon-operative care plan was discussed with the patient, who voices understanding.  Activity and lifting recommendations were discussed in length.     The risks, benefits, and alternatives to the procedure were explained to the patient.  The risks explained include, but are not limited to, bleeding, infection, pain wound complications, recurrence, incorrect diagnosis, injury to adjacent organs and structures. We also discussed the possibile need for further therapeutic, diagnostic, or surgical intervention.  The patient voiced understanding, and after all questions were answered to the patient's satisfaction, the patient provided willing and informed consent to proceed.     No orders of the defined types were placed in this encounter.      Imaging & Referrals   None    Follow Up  No follow-ups on file.    Skyler Alejandre MD

## 2024-10-03 DIAGNOSIS — G50.0 RIGHT TRIGEMINAL NEURALGIA: ICD-10-CM

## 2024-10-03 RX ORDER — CARBAMAZEPINE 200 MG/1
TABLET ORAL
Qty: 270 TABLET | Refills: 0 | Status: SHIPPED | OUTPATIENT
Start: 2024-10-03

## 2024-10-03 NOTE — TELEPHONE ENCOUNTER
Medication: Carbamazepine     Date of last refill: 10/23/23 for #270/3 additional refills  [place]    Last office visit: 4/29/24  Due back to clinic per last office note:  9 months  Date next office visit scheduled:  not yet scheduled    Last OV note recommendation: per Dr. Cordova: Discussion/Plan:  Trigeminal neuralgia   Carbamazepine controlling facial pain, has side effect of mild tiredness if in relaxed situation such as watching TV  Discussed long term risk of osteoporosis, see below. Discussed other option of adding gabapenitn with evential goal wean CBZ but facial pain may come back and not be as effectively managed by gabapentin, possibility of  Carbamazepine  Lower to 300mg in the morning, and 200mg at night  Monitor INR as CBZ interacts with warfarin  Continue Vitamin D and calcium, dosing per PCP  Labs normal     Osteopenia-   Discussed that CBZ can increase the risk of osteoporosis  Patient discussed with PCP, and calcium and weight bearing exercises were recommended, and PCP will order Dexa scan in 1-2 years  Start doing weight bearing exercises  Need 1000mg of calcium daily, check Vitamin D level with Dr. Morales when appropriate

## 2024-10-08 ENCOUNTER — ANTI-COAG VISIT (OUTPATIENT)
Dept: FAMILY MEDICINE CLINIC | Facility: CLINIC | Age: 64
End: 2024-10-08

## 2024-10-08 ENCOUNTER — LAB ENCOUNTER (OUTPATIENT)
Dept: LAB | Age: 64
End: 2024-10-08
Attending: FAMILY MEDICINE
Payer: MEDICARE

## 2024-10-08 DIAGNOSIS — I82.432 ACUTE DEEP VEIN THROMBOSIS (DVT) OF POPLITEAL VEIN OF LEFT LOWER EXTREMITY (HCC): Primary | ICD-10-CM

## 2024-10-08 DIAGNOSIS — R79.89 POSITIVE D DIMER: ICD-10-CM

## 2024-10-08 DIAGNOSIS — Z86.718 HISTORY OF DEEP VEIN THROMBOSIS (DVT) OF LOWER EXTREMITY: ICD-10-CM

## 2024-10-08 DIAGNOSIS — Z79.01 LONG TERM (CURRENT) USE OF ANTICOAGULANTS: ICD-10-CM

## 2024-10-08 DIAGNOSIS — Z86.718 HISTORY OF RECURRENT DEEP VEIN THROMBOSIS (DVT): ICD-10-CM

## 2024-10-08 LAB
INR BLD: 2.02 (ref 0.8–1.2)
PROTHROMBIN TIME: 23 SECONDS (ref 11.6–14.8)

## 2024-10-08 PROCEDURE — 85610 PROTHROMBIN TIME: CPT

## 2024-10-08 PROCEDURE — 36415 COLL VENOUS BLD VENIPUNCTURE: CPT

## 2024-10-14 ENCOUNTER — LAB ENCOUNTER (OUTPATIENT)
Dept: LAB | Age: 64
End: 2024-10-14
Attending: FAMILY MEDICINE
Payer: MEDICARE

## 2024-10-14 DIAGNOSIS — R73.03 PREDIABETES: ICD-10-CM

## 2024-10-14 DIAGNOSIS — E78.5 DYSLIPIDEMIA: ICD-10-CM

## 2024-10-14 DIAGNOSIS — E03.8 SUBCLINICAL HYPOTHYROIDISM: ICD-10-CM

## 2024-10-14 DIAGNOSIS — Z12.5 SCREENING FOR PROSTATE CANCER: ICD-10-CM

## 2024-10-14 DIAGNOSIS — Z79.899 LONG-TERM USE OF HIGH-RISK MEDICATION: ICD-10-CM

## 2024-10-14 PROBLEM — I82.532 CHRONIC DEEP VEIN THROMBOSIS (DVT) OF POPLITEAL VEIN OF LEFT LOWER EXTREMITY (HCC): Status: ACTIVE | Noted: 2023-03-31

## 2024-10-14 LAB
ALBUMIN SERPL-MCNC: 4.2 G/DL (ref 3.2–4.8)
ALBUMIN/GLOB SERPL: 1.8 {RATIO} (ref 1–2)
ALP LIVER SERPL-CCNC: 95 U/L
ALT SERPL-CCNC: 26 U/L
ANION GAP SERPL CALC-SCNC: 5 MMOL/L (ref 0–18)
AST SERPL-CCNC: 22 U/L (ref ?–34)
BASOPHILS # BLD AUTO: 0.06 X10(3) UL (ref 0–0.2)
BASOPHILS NFR BLD AUTO: 1.1 %
BILIRUB SERPL-MCNC: 0.6 MG/DL (ref 0.2–1.1)
BUN BLD-MCNC: 13 MG/DL (ref 9–23)
CALCIUM BLD-MCNC: 10.1 MG/DL (ref 8.7–10.4)
CHLORIDE SERPL-SCNC: 105 MMOL/L (ref 98–112)
CHOLEST SERPL-MCNC: 228 MG/DL (ref ?–200)
CO2 SERPL-SCNC: 28 MMOL/L (ref 21–32)
COMPLEXED PSA SERPL-MCNC: 1.85 NG/ML (ref ?–4)
CREAT BLD-MCNC: 0.89 MG/DL
EGFRCR SERPLBLD CKD-EPI 2021: 96 ML/MIN/1.73M2 (ref 60–?)
EOSINOPHIL # BLD AUTO: 0.45 X10(3) UL (ref 0–0.7)
EOSINOPHIL NFR BLD AUTO: 8.3 %
ERYTHROCYTE [DISTWIDTH] IN BLOOD BY AUTOMATED COUNT: 13.6 %
EST. AVERAGE GLUCOSE BLD GHB EST-MCNC: 117 MG/DL (ref 68–126)
FASTING PATIENT LIPID ANSWER: YES
FASTING STATUS PATIENT QL REPORTED: YES
GLOBULIN PLAS-MCNC: 2.3 G/DL (ref 2–3.5)
GLUCOSE BLD-MCNC: 97 MG/DL (ref 70–99)
HBA1C MFR BLD: 5.7 % (ref ?–5.7)
HCT VFR BLD AUTO: 44.7 %
HDLC SERPL-MCNC: 57 MG/DL (ref 40–59)
HGB BLD-MCNC: 15.2 G/DL
IMM GRANULOCYTES # BLD AUTO: 0.02 X10(3) UL (ref 0–1)
IMM GRANULOCYTES NFR BLD: 0.4 %
LDLC SERPL CALC-MCNC: 146 MG/DL (ref ?–100)
LYMPHOCYTES # BLD AUTO: 2.01 X10(3) UL (ref 1–4)
LYMPHOCYTES NFR BLD AUTO: 36.9 %
MCH RBC QN AUTO: 32.5 PG (ref 26–34)
MCHC RBC AUTO-ENTMCNC: 34 G/DL (ref 31–37)
MCV RBC AUTO: 95.7 FL
MONOCYTES # BLD AUTO: 0.62 X10(3) UL (ref 0.1–1)
MONOCYTES NFR BLD AUTO: 11.4 %
NEUTROPHILS # BLD AUTO: 2.28 X10 (3) UL (ref 1.5–7.7)
NEUTROPHILS # BLD AUTO: 2.28 X10(3) UL (ref 1.5–7.7)
NEUTROPHILS NFR BLD AUTO: 41.9 %
NONHDLC SERPL-MCNC: 171 MG/DL (ref ?–130)
OSMOLALITY SERPL CALC.SUM OF ELEC: 286 MOSM/KG (ref 275–295)
PLATELET # BLD AUTO: 201 10(3)UL (ref 150–450)
POTASSIUM SERPL-SCNC: 4.2 MMOL/L (ref 3.5–5.1)
PROT SERPL-MCNC: 6.5 G/DL (ref 5.7–8.2)
RBC # BLD AUTO: 4.67 X10(6)UL
SODIUM SERPL-SCNC: 138 MMOL/L (ref 136–145)
TRIGL SERPL-MCNC: 138 MG/DL (ref 30–149)
TSI SER-ACNC: 3.48 MIU/ML (ref 0.55–4.78)
VLDLC SERPL CALC-MCNC: 26 MG/DL (ref 0–30)
WBC # BLD AUTO: 5.4 X10(3) UL (ref 4–11)

## 2024-10-14 PROCEDURE — 83036 HEMOGLOBIN GLYCOSYLATED A1C: CPT

## 2024-10-14 PROCEDURE — 36415 COLL VENOUS BLD VENIPUNCTURE: CPT

## 2024-10-14 PROCEDURE — 80053 COMPREHEN METABOLIC PANEL: CPT

## 2024-10-14 PROCEDURE — 80061 LIPID PANEL: CPT

## 2024-10-14 PROCEDURE — 84443 ASSAY THYROID STIM HORMONE: CPT

## 2024-10-14 PROCEDURE — 85025 COMPLETE CBC W/AUTO DIFF WBC: CPT

## 2024-10-15 NOTE — ASSESSMENT & PLAN NOTE
BP shows borderline control with last BP of 122/80. Work on lifestyle changes, diet, exercise and weight management.   10/14/2024: Potassium 4.2; Creatinine 0.89; eGFR-Cr 96  BP Meds: lisinopril Tabs - 10 MG   ACE/ARB Adherence Fair at 88%, working on complinace     Orders:    EKG with interpretation and Report -IN OFFICE [[id number]]

## 2024-10-15 NOTE — ASSESSMENT & PLAN NOTE
Stable on warfarin as side effects on eliquis with seizure meds.   Checking the right side to see if we can hold this.  Orders:    Detailed, Mod Complex ([id number])

## 2024-10-15 NOTE — ASSESSMENT & PLAN NOTE
Thyroid shows Good control.   10/14/2024: TSH 3.480 well controlled current treatment plan effective, no change in therapy     Orders:    Detailed, Mod Complex ([id number])

## 2024-10-15 NOTE — ASSESSMENT & PLAN NOTE
10/14/2024: HgbA1C 5.7 (H); Glucose 97 Sugar control is stable  Continue with current treatment plan  Pre-Diabetic. Not currently on Diabetic meds.     Orders:    Detailed, Mod Complex ([id number])

## 2024-10-15 NOTE — ASSESSMENT & PLAN NOTE
Cholesterol shows Good control. Long term heart-healthy diet and lifestyle discussed and encouraged to reduce risk of cardiovascular disease.  10/14/2024: Cholesterol, Total 228 (H); HDL Cholesterol 57; Triglycerides 138; LDL Cholesterol 146 (H)  No current Cholesterol medication. stable  Continue with current treatment plan     Orders:    Detailed, Mod Complex ([id number])

## 2024-10-16 ENCOUNTER — OFFICE VISIT (OUTPATIENT)
Dept: FAMILY MEDICINE CLINIC | Facility: CLINIC | Age: 64
End: 2024-10-16
Payer: MEDICARE

## 2024-10-16 ENCOUNTER — TELEPHONE (OUTPATIENT)
Dept: FAMILY MEDICINE CLINIC | Facility: CLINIC | Age: 64
End: 2024-10-16

## 2024-10-16 VITALS
WEIGHT: 226.38 LBS | BODY MASS INDEX: 35.53 KG/M2 | HEART RATE: 76 BPM | HEIGHT: 67 IN | SYSTOLIC BLOOD PRESSURE: 122 MMHG | RESPIRATION RATE: 14 BRPM | DIASTOLIC BLOOD PRESSURE: 80 MMHG

## 2024-10-16 DIAGNOSIS — Z00.00 ANNUAL PHYSICAL EXAM: Primary | ICD-10-CM

## 2024-10-16 DIAGNOSIS — I10 ESSENTIAL HYPERTENSION: ICD-10-CM

## 2024-10-16 DIAGNOSIS — Z00.00 ENCOUNTER FOR ANNUAL HEALTH EXAMINATION: ICD-10-CM

## 2024-10-16 DIAGNOSIS — M85.89 OSTEOPENIA OF MULTIPLE SITES: ICD-10-CM

## 2024-10-16 DIAGNOSIS — Z79.01 LONG TERM (CURRENT) USE OF ANTICOAGULANTS: ICD-10-CM

## 2024-10-16 DIAGNOSIS — M19.072 ARTHRITIS OF BOTH ANKLES: ICD-10-CM

## 2024-10-16 DIAGNOSIS — M19.071 ARTHRITIS OF BOTH ANKLES: ICD-10-CM

## 2024-10-16 DIAGNOSIS — R97.20 ELEVATED PSA, LESS THAN 10 NG/ML: ICD-10-CM

## 2024-10-16 DIAGNOSIS — E03.8 SUBCLINICAL HYPOTHYROIDISM: ICD-10-CM

## 2024-10-16 DIAGNOSIS — E78.2 MIXED HYPERLIPIDEMIA: ICD-10-CM

## 2024-10-16 DIAGNOSIS — Z01.818 PREOPERATIVE CLEARANCE: ICD-10-CM

## 2024-10-16 DIAGNOSIS — R73.03 PREDIABETES: ICD-10-CM

## 2024-10-16 DIAGNOSIS — I82.532 CHRONIC DEEP VEIN THROMBOSIS (DVT) OF POPLITEAL VEIN OF LEFT LOWER EXTREMITY (HCC): ICD-10-CM

## 2024-10-16 DIAGNOSIS — K82.4 GALLBLADDER POLYP: ICD-10-CM

## 2024-10-16 DIAGNOSIS — I82.5Z1 LOWER LEG DVT (DEEP VENOUS THROMBOEMBOLISM), CHRONIC, RIGHT (HCC): ICD-10-CM

## 2024-10-16 PROCEDURE — 93000 ELECTROCARDIOGRAM COMPLETE: CPT | Performed by: FAMILY MEDICINE

## 2024-10-16 PROCEDURE — 99214 OFFICE O/P EST MOD 30 MIN: CPT | Performed by: FAMILY MEDICINE

## 2024-10-16 PROCEDURE — G0439 PPPS, SUBSEQ VISIT: HCPCS | Performed by: FAMILY MEDICINE

## 2024-10-16 NOTE — PROGRESS NOTES
Subjective:   Beau Hernandez Jr. is a 64 year old male who presents for a Subsequent Annual Wellness visit (Pt already had Initial Annual Wellness) and scheduled follow up of multiple significant but stable problems.   Dogin well. 10/14/2024: ALT 26; AST 22  but fatty liver    History/Other:   Fall Risk Assessment:   He has been screened for Falls and is low risk.      Cognitive Assessment:   He had a completely normal cognitive assessment - see flowsheet entries     Functional Ability/Status:   Beau Hernandez Jr. has some abnormal functions as listed below:  He has Hearing problems based on screening of functional status.He has Vision problems based on screening of functional status.       Depression Screening (PHQ):  PHQ-2 SCORE: 0  , done 10/16/2024   If you checked off any problems, how difficult have these problems made it for you to do your work, take care of things at home, or get along with other people?: Not difficult at all       11/22/2024 hs GB removal    Advanced Directives:   He does NOT have a Living Will. [Do you have a living will?: No]  He does NOT have a Power of  for Health Care. [Do you have a healthcare power of ?: No]  Discussed Advance Care Planning with patient (and family/surrogate if present). Standard forms made available to patient in After Visit Summary.      Patient Active Problem List   Diagnosis    Vitamin D deficiency    Mixed hyperlipidemia    Essential hypertension    Primary osteoarthritis of both knees    Elevated PSA, less than 10 ng/ml    Arthritis of both ankles    Personal history of colonic polyps    Benign neoplasm of transverse colon    Benign neoplasm of descending colon    Benign neoplasm of sigmoid colon    Rotator cuff syndrome of left shoulder    Prediabetes    Subclinical hypothyroidism    Chronic deep vein thrombosis (DVT) of popliteal vein of left lower extremity (HCC)    Osteopenia of multiple sites    History of adenomatous polyp of colon    Benign  neoplasm of cecum    Benign neoplasm of ascending colon    Fatty liver disease, nonalcoholic    Trigeminal neuralgia    History of recurrent deep vein thrombosis (DVT)    Long term (current) use of anticoagulants    Gallbladder polyp     Allergies:  He has No Known Allergies.    Current Medications:  Outpatient Medications Marked as Taking for the 10/16/24 encounter (Office Visit) with Jose Morales MD   Medication Sig    carBAMazepine 200 MG Oral Tab TAKE 1& 1/2 TABLETS IN THE MORNING AND 1 TABLET AT NIGHT    warfarin 5 MG Oral Tab Take 2 tablets (10 mg total) by mouth every other day AND 2.5 tablets (12.5 mg total) every other day.    LISINOPRIL 10 MG Oral Tab TAKE 1 TABLET BY MOUTH DAILY    famotidine 40 MG Oral Tab Take 1 tablet (40 mg total) by mouth daily.    ibuprofen 600 MG Oral Tab Take 1 tablet (600 mg total) by mouth every 8 (eight) hours as needed for Pain.    Calcium Carbonate-Vitamin D 600-400 MG-UNIT Oral Tab Take 1 tablet by mouth daily.    Cholecalciferol (VITAMIN D) 1000 UNITS Oral Cap Take 2,000 Int'l Units/day by mouth daily.         Medical History:  He  has a past medical history of Arthritis, Back pain, COVID (03/13/2023), Essential hypertension, High blood pressure (15 years ago?), Osteoarthritis (2015), Plantar fasciitis, Rotator cuff syndrome, left (11/30/2015), Sleep apnea (2015), and Wears glasses.  Surgical History:  He  has a past surgical history that includes colonoscopy with biopsy (7/19/2011); cystoscopy,insert ureteral stent; and colonoscopy.   Family History:  His family history includes Asthma in his mother; Diabetes in his mother; Heart Attack in his maternal grandmother and paternal grandfather; Hypertension in his mother; Thyroid Disorder in his mother; cushing's syndrome in his sister; leon gehrig's disease in his maternal grandmother.  Social History:  He  reports that he has never smoked. He has never used smokeless tobacco. He reports that he does not drink alcohol and does  not use drugs.    Tobacco:  He has never smoked tobacco.    CAGE Alcohol Screen:   CAGE screening score of 0 on 10/16/2024, showing low risk of alcohol abuse.      Patient Care Team:  Jose Morales MD as PCP - General (Family Practice)  Liam Avila MD (OTOLARYNGOLOGY)  Job Colon MD (GASTROENTEROLOGY)  Ry Fontaine DPM (PODIATRIST)  Kenton Winchester PT as Physical Therapist  Angie Prakash PT as Physical Therapist (Physical Therapy)  Mary Lou Cordova DO as Consulting Physician (NEUROLOGY)  Skyler Alejandre MD (SURGERY, GENERAL)    Review of Systems   Constitutional: Negative.  Negative for activity change, appetite change, chills and fever.   HENT: Negative.     Eyes: Negative.    Respiratory: Negative.  Negative for shortness of breath.    Cardiovascular: Negative.  Negative for chest pain and palpitations.   Gastrointestinal: Negative.  Negative for abdominal pain.   Genitourinary: Negative.  Negative for dysuria.   Musculoskeletal:  Negative for arthralgias.   Skin: Negative.  Negative for rash.   Allergic/Immunologic: Negative.    Neurological: Negative.          Objective:   Physical Exam  Vitals and nursing note reviewed.   Constitutional:       General: He is not in acute distress.     Appearance: Normal appearance. He is well-developed.   HENT:      Head: Normocephalic and atraumatic.      Right Ear: Tympanic membrane and external ear normal.      Left Ear: Tympanic membrane and external ear normal.      Nose: Nose normal.      Mouth/Throat:      Mouth: Mucous membranes are moist.   Eyes:      Extraocular Movements: Extraocular movements intact.      Pupils: Pupils are equal, round, and reactive to light.   Cardiovascular:      Rate and Rhythm: Normal rate and regular rhythm.      Pulses: Normal pulses.           Carotid pulses are 2+ on the right side and 2+ on the left side.       Radial pulses are 2+ on the right side and 2+ on the left side.        Dorsalis pedis pulses are 2+ on the  right side and 2+ on the left side.        Posterior tibial pulses are 2+ on the right side and 2+ on the left side.      Heart sounds: Normal heart sounds, S1 normal and S2 normal. No murmur heard.  Pulmonary:      Effort: Pulmonary effort is normal. No respiratory distress.      Breath sounds: Normal breath sounds.   Abdominal:      General: Abdomen is flat. Bowel sounds are normal. There is no distension.      Palpations: Abdomen is soft.   Musculoskeletal:         General: Normal range of motion.      Cervical back: Normal range of motion and neck supple.      Right lower leg: No edema.      Left lower leg: No edema.   Skin:     General: Skin is warm and dry.      Capillary Refill: Capillary refill takes less than 2 seconds.      Findings: No rash.   Neurological:      General: No focal deficit present.      Mental Status: He is alert and oriented to person, place, and time.   Psychiatric:         Mood and Affect: Mood normal.         Behavior: Behavior normal.         Thought Content: Thought content normal.         Judgment: Judgment normal.         /80   Pulse 76   Resp 14   Ht 5' 7\" (1.702 m)   Wt 226 lb 6.4 oz (102.7 kg)   BMI 35.46 kg/m²  Estimated body mass index is 35.46 kg/m² as calculated from the following:    Height as of this encounter: 5' 7\" (1.702 m).    Weight as of this encounter: 226 lb 6.4 oz (102.7 kg).    Medicare Hearing Assessment:   Hearing Screening    Screening Method: Whisper Test  Whisper Test Result: Pass         Visual Acuity:   Right Eye Visual Acuity: Corrected Right Eye Chart Acuity: 20/20   Left Eye Visual Acuity: Corrected Left Eye Chart Acuity: 20/20   Both Eyes Visual Acuity: Corrected Both Eyes Chart Acuity: 20/20   Able To Tolerate Visual Acuity: Yes        Assessment & Plan:   Beau Hernandez Jr. is a 64 year old male who presents for a Medicare Assessment.     1. Annual physical exam (Primary)  2. Preoperative clearance  -     Detailed, Mod Complex (33039)  -      EKG with interpretation and Report -IN OFFICE [[id number]]  3. Gallbladder polyp  Assessment & Plan:  Needs surgery for 1122  Orders:    Detailed, Mod Complex ([id number])    EKG with interpretation and Report -IN OFFICE [[id number]]    Orders:  -     Detailed, Mod Complex (38770)  -     EKG with interpretation and Report -IN OFFICE [[id number]]  4. Mixed hyperlipidemia  Overview:  aLDL  with TLC  Assessment & Plan:  Cholesterol shows Good control. Long term heart-healthy diet and lifestyle discussed and encouraged to reduce risk of cardiovascular disease.  10/14/2024: Cholesterol, Total 228 (H); HDL Cholesterol 57; Triglycerides 138; LDL Cholesterol 146 (H)  No current Cholesterol medication. stable  Continue with current treatment plan     Orders:    Detailed, Mod Complex ([id number])    Orders:  -     Detailed, Mod Complex (78336)  5. Chronic deep vein thrombosis (DVT) of popliteal vein of left lower extremity (HCC)  Overview:  3/31/23 after Covid, Eliquis 5 initially but switched to warfarin due to interaction with Carbamezapine. Off med early 2024  Assessment & Plan:  Stable on warfarin as side effects on eliquis with seizure meds.   Checking the right side to see if we can hold this.  Orders:    Detailed, Mod Complex ([id number])    Orders:  -     Detailed, Mod Complex (59192)  6. Essential hypertension  Overview:  Lisinopril 10  Assessment & Plan:  BP shows borderline control with last BP of 122/80. Work on lifestyle changes, diet, exercise and weight management.   10/14/2024: Potassium 4.2; Creatinine 0.89; eGFR-Cr 96  BP Meds: lisinopril Tabs - 10 MG   ACE/ARB Adherence Fair at 88%, working on complinace     Orders:    EKG with interpretation and Report -IN OFFICE [[id number]]    Orders:  -     EKG with interpretation and Report -IN OFFICE [[id number]]  7. Subclinical hypothyroidism  Overview:  TSH 4 in 2022  Assessment & Plan:  Thyroid shows Good control.   10/14/2024: TSH 3.480 well controlled current treatment plan effective, no change  in therapy     Orders:    Detailed, Mod Complex (46167)    Orders:  -     Detailed, Mod Complex (47836)  8. Prediabetes  Overview:  .A1c 5.8 in 2021  Assessment & Plan:  10/14/2024: HgbA1C 5.7 (H); Glucose 97 Sugar control is stable  Continue with current treatment plan  Pre-Diabetic. Not currently on Diabetic meds.     Orders:    Detailed, Mod Complex ([id number])    Orders:  -     Detailed, Mod Complex ([id number])  9. Arthritis of both ankles  Overview:  Due to working as [employer]   Assessment & Plan:  Stable, continue present management and continue to monitor for progression          10. Osteopenia of multiple sites  Overview:  Dexa 10.2023 showed -2.0 in hip and -1.6 in l spine  Assessment & Plan:  Stable, continue present management and continue to monitor for progression          11. Elevated PSA, less than 10 ng/ml  Overview:  PSA 4.8 8/2017 with BPH sx. Down to 1.6 2/2018 without tx. Dr Friedman managing  Assessment & Plan:  6/4/2020: PSA 1.69  10/14/2024: Prostate Specific Antigen Screen  1.85          12. Long term (current) use of anticoagulants  Assessment & Plan:  Will hold warfarni before surgery.  We doing an ultrasound to see if he has any superficial thrombosis left.  If he does 5 months after spot was seen in the spring, will put him on Lovenox bridge therapy before surgery, if not seen he can stop the warfarin 5 days before surgery.       13. Encounter for annual health examination  14. Lower leg DVT (deep venous thromboembolism), chronic, right (HCC)  -     US VENOUS DOPPLER LEG RIGHT - DIAG IMG (CPT=93971); Future; Expected date: 10/16/2024    The patient indicates understanding of these issues and agrees to the plan.  Reinforced healthy diet, lifestyle, and exercise.      Return in 6 months (on 4/16/2025).     Jose Morales MD, 10/16/2024     Supplementary Documentation:   General Health:  In the past six months, have you lost more than 10 pounds without trying?: 2 - No  Has your appetite  been poor?: No  Type of Diet: Balanced  How does the patient maintain a good energy level?: Appropriate Exercise;Daily Walks;Stretching  How would you describe your daily physical activity?: Moderate  How would you describe your current health state?: Good  How do you maintain positive mental well-being?: Social Interaction;Games;Visiting Friends;Visiting Family  On a scale of 0 to 10, with 0 being no pain and 10 being severe pain, what is your pain level?: 0 - (None)  In the past six months, have you experienced urine leakage?: 0-No  At any time do you feel concerned for the safety/well-being of yourself and/or your children, in your home or elsewhere?: No  Have you had any immunizations at another office such as Influenza, Hepatitis B, Tetanus, or Pneumococcal?: Yes    Health Maintenance   Topic Date Due    Zoster Vaccines (1 of 2) Never done    Annual Depression Screening  01/01/2024    COVID-19 Vaccine (1 - 2023-24 season) Never done    Influenza Vaccine (1) 10/01/2024    Annual Physical  10/12/2024    PSA  10/14/2025    DTaP,Tdap,and Td Vaccines (2 - Td or Tdap) 09/10/2028    Colorectal Cancer Screening  11/30/2030    Pneumococcal Vaccine: Birth to 64yrs  Aged Out

## 2024-10-16 NOTE — ASSESSMENT & PLAN NOTE
Needs surgery for 1122  Orders:    Detailed, Mod Complex (52687)    EKG with interpretation and Report -IN OFFICE [[id number]]

## 2024-10-16 NOTE — PATIENT INSTRUCTIONS
Beau Hernandez Jr.'s SCREENING SCHEDULE   Tests on this list are recommended by your physician but may not be covered, or covered at this frequency, by your insurer.   Please check with your insurance carrier before scheduling to verify coverage.   PREVENTATIVE SERVICES FREQUENCY &  COVERAGE DETAILS LAST COMPLETION DATE   Diabetes Screening    Fasting Blood Sugar / Glucose    One screening every 12 months if never tested or if previously tested but not diagnosed with pre-diabetes   One screening every 6 months if diagnosed with pre-diabetes Lab Results   Component Value Date    GLU 97 10/14/2024        Cardiovascular Disease Screening    Lipid Panel  Cholesterol  Lipoprotein (HDL)  Triglycerides Covered every 5 years for all Medicare beneficiaries without apparent signs or symptoms of cardiovascular disease Lab Results   Component Value Date    CHOLEST 228 (H) 10/14/2024    HDL 57 10/14/2024     (H) 10/14/2024    TRIG 138 10/14/2024         Electrocardiogram (EKG)   Covered if needed at Welcome to Medicare, and non-screening if indicated for medical reasons 06/12/2020      Ultrasound Screening for Abdominal Aortic Aneurysm (AAA) Covered once in a lifetime for one of the following risk factors   • Men who are 65-75 years old and have ever smoked   • Anyone with a family history -     Colorectal Cancer Screening  Covered for ages 50-85; only need ONE of the following:    Colonoscopy   Covered every 10 years    Covered every 2 years if patient is at high risk or previous colonoscopy was abnormal 11/30/2023    Health Maintenance   Topic Date Due   • Colorectal Cancer Screening  11/30/2030       Flexible Sigmoidoscopy   Covered every 4 years -    Fecal Occult Blood Test Covered annually -   Prostate Cancer Screening    Prostate-Specific Antigen (PSA) Annually Lab Results   Component Value Date    PSA 1.69 06/04/2020     Health Maintenance   Topic Date Due   • PSA  10/14/2025      Immunizations    Influenza Covered  once per flu season  Please get every year -  Influenza Vaccine(1) due on 10/01/2024    Pneumococcal Each vaccine ([id number] & [id number]) covered once after 65 Prevnar 13: -    Cayzhaxia15: -     No recommendations at this time    Hepatitis B One screening covered for patients with certain risk factors   -  No recommendations at this time    Tetanus Toxoid Not covered by Medicare Part B unless medically necessary (cut with metal); may be covered with your pharmacy prescription benefits 05/03/2008    Tetanus, Diptheria and Pertusis TD and TDaP Not covered by Medicare Part B -  No recommendations at this time    Zoster Not covered by Medicare Part B; may be covered with your pharmacy  prescription benefits -  Zoster Vaccines(1 of 2) Never done     Annual Monitoring of Persistent Medications (ACE/ARB, digoxin diuretics, anticonvulsants)    Potassium Annually Lab Results   Component Value Date    K 4.2 10/14/2024         Creatinine   Annually Lab Results   Component Value Date    CREATSERUM 0.89 10/14/2024         BUN Annually Lab Results   Component Value Date    BUN 13 10/14/2024       Drug Serum Conc Annually No results found for: \"DIGOXIN\", \"DIG\", \"VALP\"

## 2024-10-16 NOTE — TELEPHONE ENCOUNTER
Pt is calling because Dr Morales wants him to get an US and EKG done he is scheduled on 11/6 and wants to make sure its ok to wait that long

## 2024-10-16 NOTE — ASSESSMENT & PLAN NOTE
Will hold warfarni before surgery.  We doing an ultrasound to see if he has any superficial thrombosis left.  If he does 5 months after spot was seen in the spring, will put him on Lovenox bridge therapy before surgery, if not seen he can stop the warfarin 5 days before surgery.

## 2024-10-16 NOTE — H&P
Subjective:   Beau Hernandez Jr. is a 64 year old male who presents for a pre-operative physical exam at the request of Dr. Alejandre for evaluation of preoperative risk. Patient is to have GB removal, to be done by Dr. Alejandre  at [place] on 11/22/2024.    Pt has had previous anesthesia:  Yes.  Previous complications:  No    HPI   Pt complaints today include gallbladder polyp has been increasing in size, he is ready for surgery, no symptoms but polyp is worrisome overall..    History/Other:   Current Medications:  Outpatient Medications Marked as Taking for the 10/16/24 encounter (Office Visit) with Jose Morales MD   Medication Sig    carBAMazepine 200 MG Oral Tab TAKE 1& 1/2 TABLETS IN THE MORNING AND 1 TABLET AT NIGHT    warfarin 5 MG Oral Tab Take 2 tablets (10 mg total) by mouth every other day AND 2.5 tablets (12.5 mg total) every other day.    LISINOPRIL 10 MG Oral Tab TAKE 1 TABLET BY MOUTH DAILY    famotidine 40 MG Oral Tab Take 1 tablet (40 mg total) by mouth daily.    ibuprofen 600 MG Oral Tab Take 1 tablet (600 mg total) by mouth every 8 (eight) hours as needed for Pain.    Calcium Carbonate-Vitamin D 600-400 MG-UNIT Oral Tab Take 1 tablet by mouth daily.    Cholecalciferol (VITAMIN D) 1000 UNITS Oral Cap Take 2,000 Int'l Units/day by mouth daily.         Medical History:  He  has a past medical history of Arthritis, Back pain, COVID (03/13/2023), Essential hypertension, High blood pressure (15 years ago?), Osteoarthritis (2015), Plantar fasciitis, Rotator cuff syndrome, left (11/30/2015), Sleep apnea (2015), and Wears glasses.  Surgical History:  He  has a past surgical history that includes colonoscopy with biopsy (7/19/2011); cystoscopy,insert ureteral stent; and colonoscopy.   Family History:  His family history includes Asthma in his mother; Diabetes in his mother; Heart Attack in his maternal grandmother and paternal grandfather; Hypertension in his mother; Thyroid Disorder in his mother; cushing's  syndrome in his sister; leon gehrig's disease in his maternal grandmother.  Social History:  He  reports that he has never smoked. He has never used smokeless tobacco. He reports that he does not drink alcohol and does not use drugs.    Review of Systems   Constitutional: Negative.  Negative for activity change, appetite change, chills and fever.   HENT: Negative.     Eyes: Negative.    Respiratory: Negative.  Negative for shortness of breath.    Cardiovascular: Negative.  Negative for chest pain and palpitations.   Gastrointestinal: Negative.  Negative for abdominal pain.   Genitourinary: Negative.  Negative for dysuria.   Musculoskeletal:  Negative for arthralgias.   Skin: Negative.  Negative for rash.   Allergic/Immunologic: Negative.    Neurological: Negative.         Objective:   /80   Pulse 76   Resp 14   Ht 5' 7\" (1.702 m)   Wt 226 lb 6.4 oz (102.7 kg)   BMI 35.46 kg/m²  Body mass index is 35.46 kg/m².  Physical Exam  Vitals and nursing note reviewed.   Constitutional:       General: He is not in acute distress.     Appearance: Normal appearance.   HENT:      Head: Normocephalic and atraumatic.      Right Ear: Tympanic membrane and external ear normal.      Left Ear: Tympanic membrane and external ear normal.      Nose: Nose normal.      Mouth/Throat:      Mouth: Mucous membranes are moist.   Eyes:      Extraocular Movements: Extraocular movements intact.      Pupils: Pupils are equal, round, and reactive to light.   Cardiovascular:      Rate and Rhythm: Normal rate and regular rhythm.      Pulses: Normal pulses.           Carotid pulses are 2+ on the right side and 2+ on the left side.       Radial pulses are 2+ on the right side and 2+ on the left side.        Dorsalis pedis pulses are 2+ on the right side and 2+ on the left side.        Posterior tibial pulses are 2+ on the right side and 2+ on the left side.      Heart sounds: Normal heart sounds, S1 normal and S2 normal. No murmur  heard.  Pulmonary:      Effort: Pulmonary effort is normal.      Breath sounds: Normal breath sounds.   Abdominal:      General: Abdomen is flat. Bowel sounds are normal. There is no distension.      Palpations: Abdomen is soft.   Musculoskeletal:         General: Normal range of motion.      Cervical back: Normal range of motion and neck supple.      Right lower leg: No edema.      Left lower leg: No edema.   Skin:     General: Skin is warm and dry.      Capillary Refill: Capillary refill takes less than 2 seconds.   Neurological:      General: No focal deficit present.      Mental Status: He is alert and oriented to person, place, and time.   Psychiatric:         Mood and Affect: Mood normal.         Behavior: Behavior normal.         Thought Content: Thought content normal.          Results for orders placed or performed in visit on 10/14/24   PSA Screen ([facility])    Collection Time: 10/14/24 10:38 AM   Result Value Ref Range    Prostate Specific Antigen Screen  1.85 <=4.00 ng/mL   Lipid Panel    Collection Time: 10/14/24 10:38 AM   Result Value Ref Range    Cholesterol, Total 228 (H) <200 mg/dL    HDL Cholesterol 57 40 - 59 mg/dL    Triglycerides 138 30 - 149 mg/dL    LDL Cholesterol 146 (H) <100 mg/dL    VLDL 26 0 - 30 mg/dL    Non HDL Chol 171 (H) <130 mg/dL    Patient Fasting for Lipid? Yes    Comp Metabolic Panel (14) [[id number]]    Collection Time: 10/14/24 10:38 AM   Result Value Ref Range    Glucose 97 70 - 99 mg/dL    Sodium 138 136 - 145 mmol/L    Potassium 4.2 3.5 - 5.1 mmol/L    Chloride 105 98 - 112 mmol/L    CO2 28.0 21.0 - 32.0 mmol/L    Anion Gap 5 0 - 18 mmol/L    BUN 13 9 - 23 mg/dL    Creatinine 0.89 0.70 - 1.30 mg/dL    Calcium, Total 10.1 8.7 - 10.4 mg/dL    Calculated Osmolality 286 275 - 295 mOsm/kg    eGFR-Cr 96 >=60 mL/min/1.73m2    AST 22 <34 U/L    ALT 26 10 - 49 U/L    Alkaline Phosphatase 95 45 - 117 U/L    Bilirubin, Total 0.6 0.2 - 1.1 mg/dL    Total Protein 6.5 5.7 - 8.2 g/dL    Albumin  4.2 3.2 - 4.8 g/dL    Globulin  2.3 2.0 - 3.5 g/dL    A/G Ratio 1.8 1.0 - 2.0    Patient Fasting for CMP? Yes    Hemoglobin A1C    Collection Time: 10/14/24 10:38 AM   Result Value Ref Range    HgbA1C 5.7 (H) <5.7 %    Estimated Average Glucose 117 68 - 126 mg/dL   CBC With Differential With Platelet    Collection Time: 10/14/24 10:38 AM   Result Value Ref Range    WBC 5.4 4.0 - 11.0 x10(3) uL    RBC 4.67 4.30 - 5.70 x10(6)uL    HGB 15.2 13.0 - 17.5 g/dL    HCT 44.7 39.0 - 53.0 %    .0 150.0 - 450.0 10(3)uL    MCV 95.7 80.0 - 100.0 fL    MCH 32.5 26.0 - 34.0 pg    MCHC 34.0 31.0 - 37.0 g/dL    RDW 13.6 %    Neutrophil Absolute Prelim 2.28 1.50 - 7.70 x10 (3) uL    Neutrophil Absolute 2.28 1.50 - 7.70 x10(3) uL    Lymphocyte Absolute 2.01 1.00 - 4.00 x10(3) uL    Monocyte Absolute 0.62 0.10 - 1.00 x10(3) uL    Eosinophil Absolute 0.45 0.00 - 0.70 x10(3) uL    Basophil Absolute 0.06 0.00 - 0.20 x10(3) uL    Immature Granulocyte Absolute 0.02 0.00 - 1.00 x10(3) uL    Neutrophil % 41.9 %    Lymphocyte % 36.9 %    Monocyte % 11.4 %    Eosinophil % 8.3 %    Basophil % 1.1 %    Immature Granulocyte % 0.4 %   TSH W Reflex To Free T4    Collection Time: 10/14/24 10:38 AM   Result Value Ref Range    TSH 3.480 0.550 - 4.780 mIU/mL     Assessment & Plan  Annual physical exam         Preoperative clearance    Orders:    Detailed, Mod Complex (49176)    EKG with interpretation and Report -IN OFFICE [74612]  EKG done in June 2020, will repeat prior to surgery, if normal he is clear  Gallbladder polyp  Needs surgery for 1122  Orders:    Detailed, Mod Complex (06102)    EKG with interpretation and Report -IN OFFICE [[id number]]    Mixed hyperlipidemia  Cholesterol shows Good control. Long term heart-healthy diet and lifestyle discussed and encouraged to reduce risk of cardiovascular disease.  10/14/2024: Cholesterol, Total 228 (H); HDL Cholesterol 57; Triglycerides 138; LDL Cholesterol 146 (H)  No current Cholesterol medication.  stable  Continue with current treatment plan     Orders:    Detailed, Mod Complex (49063)    Chronic deep vein thrombosis (DVT) of popliteal vein of left lower extremity (HCC)  Stable on warfarin as side effects on eliquis with seizure meds.   Checking the right side to see if we can hold this.  Orders:    Detailed, Mod Complex (24437)    Essential hypertension  BP shows borderline control with last BP of 122/80. Work on lifestyle changes, diet, exercise and weight management.   10/14/2024: Potassium 4.2; Creatinine 0.89; eGFR-Cr 96  BP Meds: lisinopril Tabs - 10 MG   ACE/ARB Adherence Fair at 88%, working on complinace     Orders:    EKG with interpretation and Report -IN OFFICE [[id number]]    Subclinical hypothyroidism  Thyroid shows Good control.   10/14/2024: TSH 3.480 well controlled current treatment plan effective, no change in therapy     Orders:    Detailed, Mod Complex (06224)    Prediabetes  10/14/2024: HgbA1C 5.7 (H); Glucose 97 Sugar control is stable  Continue with current treatment plan  Pre-Diabetic. Not currently on Diabetic meds.     Orders:    Detailed, Mod Complex (75216)    Arthritis of both ankles  Stable, continue present management and continue to monitor for progression          Osteopenia of multiple sites  Stable, continue present management and continue to monitor for progression          Elevated PSA, less than 10 ng/ml  6/4/2020: PSA 1.69  10/14/2024: Prostate Specific Antigen Screen  1.85          Long term (current) use of anticoagulants  Will hold warfarni before surgery.  We doing an ultrasound to see if he has any superficial thrombosis left.  If he does 5 months after spot was seen in the spring, will put him on Lovenox bridge therapy before surgery, if not seen he can stop the warfarin 5 days before surgery.       Encounter for annual health examination         Lower leg DVT (deep venous thromboembolism), chronic, right (HCC)    Orders:    US VENOUS DOPPLER LEG RIGHT - DIAG IMG  (CPT=93971); Future          I am having David Hernandez Jr. maintain his Vitamin D, Calcium Carbonate-Vitamin D, ibuprofen, famotidine, lisinopril, warfarin, and carBAMazepine.     Beau Hernandez Jr. is a 64 year old male who presents for a pre-operative physical exam.  Encounter Diagnoses   Name Primary?    Annual physical exam Yes    Mixed hyperlipidemia     Chronic deep vein thrombosis (DVT) of popliteal vein of left lower extremity (HCC)     Essential hypertension     Subclinical hypothyroidism     Prediabetes     Arthritis of both ankles     Osteopenia of multiple sites     Elevated PSA, less than 10 ng/ml     Long term (current) use of anticoagulants     Preoperative clearance     Gallbladder polyp     Encounter for annual health examination     Lower leg DVT (deep venous thromboembolism), chronic, right (HCC)        Pt has no significant history of cardiac or pulmonary conditions. Pt is a good surgical candidate. This consult was sent back the referring physician, Dr. Alejandre.        Return in 6 months (on 4/16/2025).    Jose Morales MD 10/16/2024

## 2024-11-06 ENCOUNTER — EKG ENCOUNTER (OUTPATIENT)
Dept: LAB | Age: 64
End: 2024-11-06
Attending: FAMILY MEDICINE
Payer: MEDICARE

## 2024-11-06 ENCOUNTER — HOSPITAL ENCOUNTER (OUTPATIENT)
Dept: ULTRASOUND IMAGING | Age: 64
Discharge: HOME | End: 2024-11-06
Attending: FAMILY MEDICINE
Payer: MEDICARE

## 2024-11-06 DIAGNOSIS — I10 HTN (HYPERTENSION): Primary | ICD-10-CM

## 2024-11-06 DIAGNOSIS — K82.4 POLYP OF GALLBLADDER: ICD-10-CM

## 2024-11-06 DIAGNOSIS — I82.5Z1 LOWER LEG DVT (DEEP VENOUS THROMBOEMBOLISM), CHRONIC, RIGHT (HCC): ICD-10-CM

## 2024-11-06 DIAGNOSIS — Z01.818 PREOP EXAMINATION: ICD-10-CM

## 2024-11-06 LAB
ATRIAL RATE: 69 BPM
P AXIS: 34 DEGREES
P-R INTERVAL: 194 MS
Q-T INTERVAL: 374 MS
QRS DURATION: 92 MS
QTC CALCULATION (BEZET): 400 MS
R AXIS: 57 DEGREES
T AXIS: 14 DEGREES
VENTRICULAR RATE: 69 BPM

## 2024-11-06 PROCEDURE — 93005 ELECTROCARDIOGRAM TRACING: CPT

## 2024-11-06 PROCEDURE — 93010 ELECTROCARDIOGRAM REPORT: CPT | Performed by: INTERNAL MEDICINE

## 2024-11-06 PROCEDURE — 93971 EXTREMITY STUDY: CPT | Performed by: FAMILY MEDICINE

## 2024-11-08 ENCOUNTER — ANESTHESIA EVENT (OUTPATIENT)
Dept: SURGERY | Facility: HOSPITAL | Age: 64
End: 2024-11-08
Payer: MEDICARE

## 2024-11-08 ENCOUNTER — PATIENT MESSAGE (OUTPATIENT)
Dept: FAMILY MEDICINE CLINIC | Facility: CLINIC | Age: 64
End: 2024-11-08

## 2024-11-11 ENCOUNTER — TELEPHONE (OUTPATIENT)
Facility: LOCATION | Age: 64
End: 2024-11-11

## 2024-11-11 DIAGNOSIS — K82.4 GALLBLADDER POLYP: Primary | ICD-10-CM

## 2024-11-12 ENCOUNTER — TELEPHONE (OUTPATIENT)
Dept: FAMILY MEDICINE CLINIC | Facility: CLINIC | Age: 64
End: 2024-11-12

## 2024-11-12 NOTE — TELEPHONE ENCOUNTER
[facility] wanted to OK the greater than 10 mg dosage of warfarin with David. Told them yes and the will now dispense the medication.

## 2024-11-19 ENCOUNTER — TELEPHONE (OUTPATIENT)
Facility: LOCATION | Age: 64
End: 2024-11-19

## 2024-11-19 ENCOUNTER — TELEPHONE (OUTPATIENT)
Dept: FAMILY MEDICINE CLINIC | Facility: CLINIC | Age: 64
End: 2024-11-19

## 2024-11-19 NOTE — TELEPHONE ENCOUNTER
Received call from [facility] with questions about Lovenox.  Pt has sx this Friday 11/22/24 w/Dr. Alejandre.  Pls call Adrianne [phone].

## 2024-11-19 NOTE — TELEPHONE ENCOUNTER
Adrianne the charge nurse from [facility] is calling to verify if pt is taking Lovenox or not

## 2024-11-19 NOTE — TELEPHONE ENCOUNTER
Spoke to patient.  Patient denies being on Lovenox.  He has been holding his Warfarin for 5 days now.

## 2024-11-19 NOTE — TELEPHONE ENCOUNTER
Called and talked to [facility] gave them the information and they had already listened to the message.

## 2024-11-19 NOTE — TELEPHONE ENCOUNTER
He will be off medication for surgery and not need to continue afterwards and not need bridging. .thanks

## 2024-11-19 NOTE — TELEPHONE ENCOUNTER
Called [facility] in detail that no longer has a clot so will come off the medication and will not be going back on so no need to bridge with lovenox.

## 2024-11-22 ENCOUNTER — ANESTHESIA (OUTPATIENT)
Dept: SURGERY | Facility: HOSPITAL | Age: 64
End: 2024-11-22
Payer: MEDICARE

## 2024-11-22 ENCOUNTER — HOSPITAL ENCOUNTER (OUTPATIENT)
Facility: HOSPITAL | Age: 64
Setting detail: HOSPITAL OUTPATIENT SURGERY
Discharge: HOME | End: 2024-11-22
Attending: SURGERY | Admitting: SURGERY
Payer: MEDICARE

## 2024-11-22 VITALS
BODY MASS INDEX: 35.71 KG/M2 | SYSTOLIC BLOOD PRESSURE: 137 MMHG | TEMPERATURE: 98 F | HEIGHT: 67.25 IN | DIASTOLIC BLOOD PRESSURE: 78 MMHG | OXYGEN SATURATION: 97 % | HEART RATE: 77 BPM | WEIGHT: 230.19 LBS | RESPIRATION RATE: 18 BRPM

## 2024-11-22 DIAGNOSIS — K81.1 CHRONIC CHOLECYSTITIS: Primary | ICD-10-CM

## 2024-11-22 DIAGNOSIS — K82.4 GALLBLADDER POLYP: ICD-10-CM

## 2024-11-22 LAB
INR BLD: 1.07 (ref 0.8–1.2)
PROTHROMBIN TIME: 14.1 SECONDS (ref 11.6–14.8)

## 2024-11-22 PROCEDURE — BF53200 OTHER IMAGING OF GALLBLADDER AND BILE DUCTS USING FLUORESCING AGENT, INDOCYANINE GREEN DYE, INTRAOPERATIVE: ICD-10-PCS | Performed by: SURGERY

## 2024-11-22 PROCEDURE — 47562 LAPAROSCOPIC CHOLECYSTECTOMY: CPT

## 2024-11-22 PROCEDURE — 0FT44ZZ RESECTION OF GALLBLADDER, PERCUTANEOUS ENDOSCOPIC APPROACH: ICD-10-PCS | Performed by: SURGERY

## 2024-11-22 PROCEDURE — 47562 LAPAROSCOPIC CHOLECYSTECTOMY: CPT | Performed by: SURGERY

## 2024-11-22 RX ORDER — ACETAMINOPHEN 500 MG
1000 TABLET ORAL ONCE
Status: DISCONTINUED | OUTPATIENT
Start: 2024-11-22 | End: 2024-11-22 | Stop reason: HOSPADM

## 2024-11-22 RX ORDER — SODIUM CHLORIDE, SODIUM LACTATE, POTASSIUM CHLORIDE, CALCIUM CHLORIDE 600; 310; 30; 20 MG/100ML; MG/100ML; MG/100ML; MG/100ML
INJECTION, SOLUTION INTRAVENOUS CONTINUOUS
Status: DISCONTINUED | OUTPATIENT
Start: 2024-11-22 | End: 2024-11-22

## 2024-11-22 RX ORDER — NEOSTIGMINE METHYLSULFATE 1 MG/ML
INJECTION INTRAVENOUS AS NEEDED
Status: DISCONTINUED | OUTPATIENT
Start: 2024-11-22 | End: 2024-11-22 | Stop reason: SURG

## 2024-11-22 RX ORDER — INDOCYANINE GREEN AND WATER 25 MG
5 KIT INJECTION ONCE
Status: COMPLETED | OUTPATIENT
Start: 2024-11-22 | End: 2024-11-22

## 2024-11-22 RX ORDER — HYDROMORPHONE HYDROCHLORIDE 1 MG/ML
INJECTION, SOLUTION INTRAMUSCULAR; INTRAVENOUS; SUBCUTANEOUS
Status: COMPLETED
Start: 2024-11-22 | End: 2024-11-22

## 2024-11-22 RX ORDER — ONDANSETRON 2 MG/ML
4 INJECTION INTRAMUSCULAR; INTRAVENOUS EVERY 6 HOURS PRN
Status: DISCONTINUED | OUTPATIENT
Start: 2024-11-22 | End: 2024-11-22

## 2024-11-22 RX ORDER — KETOROLAC TROMETHAMINE 30 MG/ML
30 INJECTION, SOLUTION INTRAMUSCULAR; INTRAVENOUS EVERY 6 HOURS PRN
Status: DISCONTINUED | OUTPATIENT
Start: 2024-11-22 | End: 2024-11-22

## 2024-11-22 RX ORDER — SENNA AND DOCUSATE SODIUM 50; 8.6 MG/1; MG/1
1 TABLET, FILM COATED ORAL DAILY
Qty: 30 TABLET | Refills: 0 | Status: SHIPPED | OUTPATIENT
Start: 2024-11-22

## 2024-11-22 RX ORDER — HYDROCODONE BITARTRATE AND ACETAMINOPHEN 5; 325 MG/1; MG/1
2 TABLET ORAL ONCE AS NEEDED
Status: COMPLETED | OUTPATIENT
Start: 2024-11-22 | End: 2024-11-22

## 2024-11-22 RX ORDER — PROCHLORPERAZINE EDISYLATE 5 MG/ML
5 INJECTION INTRAMUSCULAR; INTRAVENOUS EVERY 8 HOURS PRN
Status: DISCONTINUED | OUTPATIENT
Start: 2024-11-22 | End: 2024-11-22

## 2024-11-22 RX ORDER — HYDROMORPHONE HYDROCHLORIDE 1 MG/ML
0.6 INJECTION, SOLUTION INTRAMUSCULAR; INTRAVENOUS; SUBCUTANEOUS EVERY 5 MIN PRN
Status: DISCONTINUED | OUTPATIENT
Start: 2024-11-22 | End: 2024-11-22

## 2024-11-22 RX ORDER — GLYCOPYRROLATE 0.2 MG/ML
INJECTION, SOLUTION INTRAMUSCULAR; INTRAVENOUS AS NEEDED
Status: DISCONTINUED | OUTPATIENT
Start: 2024-11-22 | End: 2024-11-22 | Stop reason: SURG

## 2024-11-22 RX ORDER — HEPARIN SODIUM 5000 [USP'U]/ML
5000 INJECTION, SOLUTION INTRAVENOUS; SUBCUTANEOUS ONCE
Status: DISCONTINUED | OUTPATIENT
Start: 2024-11-22 | End: 2024-11-22 | Stop reason: HOSPADM

## 2024-11-22 RX ORDER — BUPIVACAINE HYDROCHLORIDE AND EPINEPHRINE 5; 5 MG/ML; UG/ML
INJECTION, SOLUTION EPIDURAL; INTRACAUDAL; PERINEURAL AS NEEDED
Status: DISCONTINUED | OUTPATIENT
Start: 2024-11-22 | End: 2024-11-22 | Stop reason: HOSPADM

## 2024-11-22 RX ORDER — HYDROMORPHONE HYDROCHLORIDE 1 MG/ML
0.2 INJECTION, SOLUTION INTRAMUSCULAR; INTRAVENOUS; SUBCUTANEOUS EVERY 5 MIN PRN
Status: DISCONTINUED | OUTPATIENT
Start: 2024-11-22 | End: 2024-11-22

## 2024-11-22 RX ORDER — NALOXONE HYDROCHLORIDE 0.4 MG/ML
0.08 INJECTION, SOLUTION INTRAMUSCULAR; INTRAVENOUS; SUBCUTANEOUS AS NEEDED
Status: DISCONTINUED | OUTPATIENT
Start: 2024-11-22 | End: 2024-11-22

## 2024-11-22 RX ORDER — DEXAMETHASONE SODIUM PHOSPHATE 4 MG/ML
VIAL (ML) INJECTION AS NEEDED
Status: DISCONTINUED | OUTPATIENT
Start: 2024-11-22 | End: 2024-11-22 | Stop reason: SURG

## 2024-11-22 RX ORDER — ACETAMINOPHEN 500 MG
1000 TABLET ORAL ONCE AS NEEDED
Status: COMPLETED | OUTPATIENT
Start: 2024-11-22 | End: 2024-11-22

## 2024-11-22 RX ORDER — ONDANSETRON 2 MG/ML
INJECTION INTRAMUSCULAR; INTRAVENOUS AS NEEDED
Status: DISCONTINUED | OUTPATIENT
Start: 2024-11-22 | End: 2024-11-22 | Stop reason: SURG

## 2024-11-22 RX ORDER — SCOLOPAMINE TRANSDERMAL SYSTEM 1 MG/1
1 PATCH, EXTENDED RELEASE TRANSDERMAL ONCE
Status: DISCONTINUED | OUTPATIENT
Start: 2024-11-22 | End: 2024-11-22 | Stop reason: HOSPADM

## 2024-11-22 RX ORDER — KETOROLAC TROMETHAMINE 30 MG/ML
INJECTION, SOLUTION INTRAMUSCULAR; INTRAVENOUS
Status: COMPLETED
Start: 2024-11-22 | End: 2024-11-22

## 2024-11-22 RX ORDER — HYDROMORPHONE HYDROCHLORIDE 1 MG/ML
0.4 INJECTION, SOLUTION INTRAMUSCULAR; INTRAVENOUS; SUBCUTANEOUS EVERY 5 MIN PRN
Status: DISCONTINUED | OUTPATIENT
Start: 2024-11-22 | End: 2024-11-22

## 2024-11-22 RX ORDER — ONDANSETRON 2 MG/ML
INJECTION INTRAMUSCULAR; INTRAVENOUS
Status: COMPLETED
Start: 2024-11-22 | End: 2024-11-22

## 2024-11-22 RX ORDER — PROCHLORPERAZINE EDISYLATE 5 MG/ML
INJECTION INTRAMUSCULAR; INTRAVENOUS
Status: COMPLETED
Start: 2024-11-22 | End: 2024-11-22

## 2024-11-22 RX ORDER — OXYCODONE HYDROCHLORIDE 5 MG/1
5 TABLET ORAL EVERY 6 HOURS PRN
Qty: 20 TABLET | Refills: 0 | Status: SHIPPED | OUTPATIENT
Start: 2024-11-22

## 2024-11-22 RX ORDER — ROCURONIUM BROMIDE 10 MG/ML
INJECTION, SOLUTION INTRAVENOUS AS NEEDED
Status: DISCONTINUED | OUTPATIENT
Start: 2024-11-22 | End: 2024-11-22 | Stop reason: SURG

## 2024-11-22 RX ORDER — HYDROCODONE BITARTRATE AND ACETAMINOPHEN 5; 325 MG/1; MG/1
1 TABLET ORAL ONCE AS NEEDED
Status: COMPLETED | OUTPATIENT
Start: 2024-11-22 | End: 2024-11-22

## 2024-11-22 RX ORDER — LIDOCAINE HYDROCHLORIDE 10 MG/ML
INJECTION, SOLUTION EPIDURAL; INFILTRATION; INTRACAUDAL; PERINEURAL AS NEEDED
Status: DISCONTINUED | OUTPATIENT
Start: 2024-11-22 | End: 2024-11-22 | Stop reason: SURG

## 2024-11-22 RX ORDER — MIDAZOLAM HYDROCHLORIDE 1 MG/ML
1 INJECTION INTRAMUSCULAR; INTRAVENOUS EVERY 5 MIN PRN
Status: DISCONTINUED | OUTPATIENT
Start: 2024-11-22 | End: 2024-11-22

## 2024-11-22 RX ADMIN — ROCURONIUM BROMIDE 20 MG: 10 INJECTION, SOLUTION INTRAVENOUS at 08:17:00

## 2024-11-22 RX ADMIN — NEOSTIGMINE METHYLSULFATE 4.5 MG: 1 INJECTION INTRAVENOUS at 08:51:00

## 2024-11-22 RX ADMIN — ROCURONIUM BROMIDE 40 MG: 10 INJECTION, SOLUTION INTRAVENOUS at 07:59:00

## 2024-11-22 RX ADMIN — SODIUM CHLORIDE, SODIUM LACTATE, POTASSIUM CHLORIDE, CALCIUM CHLORIDE: 600; 310; 30; 20 INJECTION, SOLUTION INTRAVENOUS at 09:08:00

## 2024-11-22 RX ADMIN — GLYCOPYRROLATE 1 MG: 0.2 INJECTION, SOLUTION INTRAMUSCULAR; INTRAVENOUS at 08:51:00

## 2024-11-22 RX ADMIN — LIDOCAINE HYDROCHLORIDE 100 MG: 10 INJECTION, SOLUTION EPIDURAL; INFILTRATION; INTRACAUDAL; PERINEURAL at 07:53:00

## 2024-11-22 RX ADMIN — DEXAMETHASONE SODIUM PHOSPHATE 4 MG: 4 MG/ML VIAL (ML) INJECTION at 07:59:00

## 2024-11-22 RX ADMIN — ONDANSETRON 4 MG: 2 INJECTION INTRAMUSCULAR; INTRAVENOUS at 08:51:00

## 2024-11-22 NOTE — DISCHARGE INSTRUCTIONS
Home Care Instructions  Cholecystectomy  Dr Skyler Alejandre    MEDICATIONS  For post-operative pain control the medication is Oxycodone. This is a narcotic and best taken by starting with one tablet and repeating every four to six hours as needed. If the patient does not feel they need the narcotics they shouldn’t take them. If the pain is severe the patient may take up to two pills every four hours.  Please supplement with Advil (ibuprofen) or Motrin. You may take ibuprofen after 4pm. Please ask your surgeon before resuming blood thinners such as aspirin, plavix or coumadin. All other home medications may be resumed as scheduled.     DIET  The patient may resume a general diet immediately. This is not a good time to eat excessively. The patient should eat in moderation and stick with foods the patient feels are easy to digest. Avoid high fat foods in the immediate post-operative time period as this may still cause some problems. The patient may eat anything in small amounts but foods rich in dairy products, fatty foods or fried foods may cause an upset stomach for up to six weeks after surgery. There should be no alcohol consumption in the immediate recovery time period or within six hours of taking narcotics.    WOUND CARE  Your incisions are covered with skin glue. This will come off when it's ready. The patient may shower the day after surgery. There is no need to cover the incisions. Soap can get on the wounds but do not scrub over the wounds. No hair dye or chemicals of any kind should get in the wounds. Avoid tub baths for two weeks. Most wounds will be closed with dissolving suture underneath the skin. These sutures will dissolve on their own.     ACTIVITY  Every day the patient should be up, showered and dressed. Each day the patient should be up and around the house. The patient should not lie in bed and should not stay in [facility]. We count on the patient being up, coughing, walking and deep breathing to  avoid pneumonia and blood clots in the legs. Once a day the patient should get out of the house and go shopping, go to the mall, the hardware store, the movies or a restaurant. The patient may ride in a car but should not drive the car for at least one week. Patients should be off narcotics for at least 8 hours prior to being a . The average time off work is 10 to 14 days; most adults will be seeing the surgeon prior to returning to work. Students will return to school within 1-5 days after discharge from the hospital but will be off gym, sports, and indoors for recess for one month. Patients may go up and down stairs and lift up to five pounds but no bending, pushing or pulling. Nothing called work or exercise until the follow up visit. No ‘stair-master’, power walking, jogging or workouts until the follow up visit. Patients should seek further activity limits at the time of their appointment.    APPOINTMENT  Please call our office for an appointment within five to ten days of discharge Any fever greater than 100.5, chills, nausea, vomiting, or severe diarrhea please call out office. If the wound turns red, hot, swollen, becomes increasingly painful, or drains pus call us immediately at [phone]. For life threatening emergencies call [phone]. For non-emergent care please call our office after 9:30 a.m. Monday through Saturday. The number listed above is out office number, out phone automatically switches to out answering service if we are not there. Please do not use the emergency room for non-urgent care.      Thank you for entrusting me with your care.  [facility]

## 2024-11-22 NOTE — SPIRITUAL CARE NOTE
Spiritual Care Visit Note    Patient Name: Beau Hernandez Jr. Date of Spiritual Care Visit: 24   : 1960 Primary Dx: <principal problem not specified>   Referred By: Pre-Op rounds    Spiritual Care Taxonomy:    Intended Effects: Demonstrate caring and concern    Methods: Encourage sharing of feelings;Encourage story-telling;Offer support    Interventions: Acknowledge current situation;Active listening;Ask questions to bring forth feelings;Identify supportive relationship(s)    Visit Type/Summary:  David was visiting with a guest when  arrived.  They both seemed calm regarding upcoming procedure.   - Spiritual Care: Offered empathic listening and emotional support. Patient and family expressed appreciation for  visit.  remains available as needed for follow up.  David was visiting with guest when   Spiritual Care support can be requested via an Epic consult. For urgent/immediate needs, please contact the On Call  at: Edward: ext [phone]    Latonya Sahni MDiv.  Staff .

## 2024-11-22 NOTE — ANESTHESIA POSTPROCEDURE EVALUATION
[facility]    Beau Hernandez Inocencio Patient Status:  Hospital Outpatient Surgery   Age/Gender 64 year old male MRN [id number]   Location [facility] POST ANESTHESIA CARE UNIT Attending Skyler Alejandre MD   Hosp Day # 0 PCP Jose Morales MD       Anesthesia Post-op Note    LAPAROSCOPIC CHOLECYSTECTOMY WITH INJECTION OF INDOCYANINE GREEN    Procedure Summary       Date: 11/22/24 Room / Location:  MAIN OR [address] [place] [address] MAIN OR    Anesthesia Start: 0750 Anesthesia Stop: 0908    Procedure: LAPAROSCOPIC CHOLECYSTECTOMY WITH INJECTION OF INDOCYANINE GREEN (Abdomen) Diagnosis:       Gallbladder polyp      (Gallbladder polyp [K82.4])    Surgeons: Skyler Alejandre MD Anesthesiologist: Laura Delacruz MD    Anesthesia Type: general ASA Status: 2            Anesthesia Type: general    Vitals Value Taken Time   /93 11/22/24 0921   Temp 97.2 °F (36.2 °C) 11/22/24 0905   Pulse 74 11/22/24 0921   Resp 21 11/22/24 0921   SpO2 97 % 11/22/24 0921   Vitals shown include unfiled device data.    Patient Location: PACU    Anesthesia Type: general    Airway Patency: patent and extubated    Postop Pain Control: adequate    Mental Status: mildly sedated but able to meaningfully participate in the post-anesthesia evaluation    Nausea/Vomiting: none    Cardiopulmonary/Hydration status: stable euvolemic    Complications: no apparent anesthesia related complications    Postop vital signs: stable    Dental Exam: Unchanged from Preop    Patient to be discharged from PACU when criteria met.

## 2024-11-22 NOTE — ANESTHESIA PROCEDURE NOTES
Airway  Date/Time: 11/22/2024 7:55 AM  Urgency: elective    Airway not difficult    General Information and Staff    Patient location during procedure: OR  Anesthesiologist: Laura Delacruz MD  Performed: anesthesiologist   Performed by: Laura Delacruz MD  Authorized by: Laura Delacruz MD      Indications and Patient Condition  Indications for airway management: anesthesia  Spontaneous Ventilation: absent  Sedation level: deep  Preoxygenated: yes  Patient position: sniffing  Mask difficulty assessment: 0 - not attempted    Final Airway Details  Final airway type: endotracheal airway      Successful airway: ETT  Cuffed: yes   Successful intubation technique: direct laryngoscopy  Endotracheal tube insertion site: oral  Blade: Shira  Blade size: #3  ETT size (mm): 8.0    Cormack-Lehane Classification: grade I - full view of glottis  Placement verified by: capnometry   Measured from: lips  ETT to lips (cm): 24  Number of attempts at approach: 1

## 2024-11-22 NOTE — ANESTHESIA PREPROCEDURE EVALUATION
PRE-OP EVALUATION    Patient Name: Baeu Hernandez Jr.    Admit Diagnosis: Gallbladder polyp [K82.4]    Pre-op Diagnosis: Gallbladder polyp [K82.4]    LAPAROSCOPIC CHOLECYSTECTOMY POSSIBLE OPEN WITH INJECTION OF INDOCYANINE GREEN    Anesthesia Procedure: LAPAROSCOPIC CHOLECYSTECTOMY POSSIBLE OPEN WITH INJECTION OF INDOCYANINE GREEN    Surgeons and Role:     * Skyler Alejandre MD - Primary    Pre-op vitals reviewed.  Temp: 97.6 °F (36.4 °C)  Pulse: 82  Resp: 16  BP: 129/83  SpO2: 98 %  Body mass index is 35.78 kg/m².    Current medications reviewed.  Hospital Medications:   acetaminophen (Tylenol Extra Strength) tab 1,000 mg  1,000 mg Oral Once    scopolamine (Transderm-Scop) 1 MG/3DAYS patch 1 patch  1 patch Transdermal Once    lactated ringers infusion   Intravenous Continuous    heparin (Porcine) 5000 UNIT/ML injection 5,000 Units  5,000 Units Subcutaneous Once    [COMPLETED] indocyanine green (IC-Green) injection 5 mg  5 mg Intravenous Once    ceFAZolin (Ancef) 2g in 10mL IV syringe premix  2 g Intravenous Once       Outpatient Medications:   Prescriptions Prior to Admission[1]    Allergies: Patient has no known allergies.      Anesthesia Evaluation    Patient summary reviewed.    Anesthetic Complications  (-) history of anesthetic complications         GI/Hepatic/Renal                (+) liver disease                 Cardiovascular  Comment: DVT              (+) obesity  (+) hypertension                                     Endo/Other                                  Pulmonary                    (+) sleep apnea       Neuro/Psych                 (+) neuromuscular disease                     Past Surgical History:   Procedure Laterality Date    Colonoscopy      Colonoscopy with biopsy  7/19/2011    Cystoscopy,insert ureteral stent       Social History     Socioeconomic History    Marital status:    Occupational History    Occupation: Damage Prevention    Tobacco Use    Smoking status: Never    Smokeless  tobacco: Never    Tobacco comments:     NONE   Vaping Use    Vaping status: Never Used   Substance and Sexual Activity    Alcohol use: Never     Comment: Modestly 1-3 beers monthly or none!    Drug use: Never   Other Topics Concern    Caffeine Concern No    Stress Concern No    Weight Concern Yes     Comment: I have been seeing a Dietitian at [facility].    Special Diet No    Exercise Yes    Seat Belt Yes     History   Drug Use Unknown     Available pre-op labs reviewed.  Lab Results   Component Value Date    WBC 5.4 10/14/2024    RBC 4.67 10/14/2024    HGB 15.2 10/14/2024    HCT 44.7 10/14/2024    MCV 95.7 10/14/2024    MCH 32.5 10/14/2024    MCHC 34.0 10/14/2024    RDW 13.6 10/14/2024    .0 10/14/2024     Lab Results   Component Value Date     10/14/2024    K 4.2 10/14/2024     10/14/2024    CO2 28.0 10/14/2024    BUN 13 10/14/2024    CREATSERUM 0.89 10/14/2024    GLU 97 10/14/2024    CA 10.1 10/14/2024     Lab Results   Component Value Date    INR 1.07 11/22/2024         Airway      Mallampati: II  Mouth opening: >3 FB  TM distance: > 6 cm  Neck ROM: full Cardiovascular    Cardiovascular exam normal.  Rhythm: regular  Rate: normal     Dental  Comment: Denies chipped or loose teeth    Dental appliance(s): partials       Pulmonary    Pulmonary exam normal.  Breath sounds clear to auscultation bilaterally.               Other findings              ASA: 2   Plan: general  NPO status verified and patient meets guidelines.    Post-procedure pain management plan discussed with surgeon and patient.    Comment: Risks include but limited to oral and dental injury, nausea/vomiting, anaphylaxis, prolonged ventilation and myocardial infarction. All questions were answered to the patient's satisfaction. Patient expressed understanding and wishes to proceed.   Plan/risks discussed with: patient and spouse                Present on Admission:  **None**             [1]   Medications Prior to Admission    Medication Sig Dispense Refill Last Dose/Taking    carBAMazepine 200 MG Oral Tab TAKE 1& 1/2 TABLETS IN THE MORNING AND 1 TABLET AT NIGHT 270 tablet 0 11/21/2024 at 11:00 PM    warfarin 5 MG Oral Tab Take 2 tablets (10 mg total) by mouth every other day AND 2.5 tablets (12.5 mg total) every other day. 150 tablet 5 11/16/2024    LISINOPRIL 10 MG Oral Tab TAKE 1 TABLET BY MOUTH DAILY 90 tablet 3 11/21/2024 at  9:00 AM    famotidine 40 MG Oral Tab Take 1 tablet (40 mg total) by mouth daily. 90 tablet 3 Past Week    Calcium Carbonate-Vitamin D 600-400 MG-UNIT Oral Tab Take 1 tablet by mouth daily.   11/21/2024 at  9:00 AM    Cholecalciferol (VITAMIN D) 1000 UNITS Oral Cap Take 2,000 Int'l Units/day by mouth daily.     11/21/2024 at  9:00 AM    ibuprofen 600 MG Oral Tab Take 1 tablet (600 mg total) by mouth every 8 (eight) hours as needed for Pain. 90 tablet 3 More than a month

## 2024-11-22 NOTE — H&P
New Patient Visit Note         History of Present Illness     Patient presents at the request of his primary care physician for evaluation of gallbladder polyp.  The patient has known history of gallbladder polyp and recent ultrasound shows increase in size to approximately 10 mm.  Prior ultrasounds revealed gallbladder polyp measuring 5 then 6 mm.  I reviewed the images and I concur with the interpretation.  I discussed findings with patient and his spouse in context of his presenting symptoms.  Based on criteria of size and growth over time, the patient meets criteria for cholecystectomy.    The patient denies fever, chills, chest pain, shortness of breath, dyspnea. The patient also denies hematemesis, melena, or hematochezia. The patient denies change in bowel or bladder habits. There is no complaint of hematuria or dysuria.    I discussed the risk, benefits, alternatives of surgery.  I discussed the anticipated postoperative recovery including dietary, activity, exercise, and lifestyle recommendations.  The patient's questions regarding surgical procedure were answered and the patient voiced understanding of the care plan.    Allergies  Beau has No Known Allergies.    Past Medical / Surgical / Social / Family History    The past medical and past surgical history have been reviewed by me today.    Past Medical History:    Arthritis    Back pain    COVID    Deep vein thrombosis (HCC)    Essential hypertension    High blood pressure    I take Lisinpril 10mg once daily. Dr Morales told me about 10 years ago that I could stop taking it. He says that there are other good reasons to continue with it!    Osteoarthritis    Both knees due to occupation.    Plantar fasciitis    Rotator cuff syndrome, left    Sleep apnea    Wife says I snore and have it. - not a formal diagnosis    Visual impairment    Glasses    Wears glasses     Past Surgical History:   Procedure Laterality Date    Colonoscopy      Colonoscopy with biopsy   7/19/2011    Cystoscopy,insert ureteral stent         The family history and social history have been reviewed by me today.    Family History   Problem Relation Age of Onset    Heart Attack Maternal Grandmother     Other (leon gehrig's disease) Maternal Grandmother     Heart Attack Paternal Grandfather     Asthma Mother     Diabetes Mother         Mom is pre-diabetic.    Hypertension Mother     Thyroid Disorder Mother     Other (cushing's syndrome) Sister      Social History     Socioeconomic History    Marital status:    Occupational History    Occupation: Damage Prevention    Tobacco Use    Smoking status: Never    Smokeless tobacco: Never    Tobacco comments:     NONE   Vaping Use    Vaping status: Never Used   Substance and Sexual Activity    Alcohol use: Never     Comment: Modestly 1-3 beers monthly or none!    Drug use: Never   Other Topics Concern    Caffeine Concern No    Stress Concern No    Weight Concern Yes     Comment: I have been seeing a Dietitian at [facility].    Special Diet No    Exercise Yes    Seat Belt Yes        Current Outpatient Medications:     carBAMazepine 200 MG Oral Tab, TAKE 1& 1/2 TABLETS IN THE MORNING AND 1 TABLET AT NIGHT, Disp: 270 tablet, Rfl: 0    warfarin 5 MG Oral Tab, Take 2 tablets (10 mg total) by mouth every other day AND 2.5 tablets (12.5 mg total) every other day., Disp: 150 tablet, Rfl: 5    LISINOPRIL 10 MG Oral Tab, TAKE 1 TABLET BY MOUTH DAILY, Disp: 90 tablet, Rfl: 3    famotidine 40 MG Oral Tab, Take 1 tablet (40 mg total) by mouth daily., Disp: 90 tablet, Rfl: 3    ibuprofen 600 MG Oral Tab, Take 1 tablet (600 mg total) by mouth every 8 (eight) hours as needed for Pain., Disp: 90 tablet, Rfl: 3    Calcium Carbonate-Vitamin D 600-400 MG-UNIT Oral Tab, Take 1 tablet by mouth daily., Disp: , Rfl:     Cholecalciferol (VITAMIN D) 1000 UNITS Oral Cap, Take 2,000 Int'l Units/day by mouth daily.  , Disp: , Rfl:       Review of Systems  The Review of Systems  has been reviewed by me during today.  Review of Systems   Constitutional: Negative.    HENT: Negative.     Eyes: Negative.    Respiratory: Negative.     Cardiovascular: Negative.    Gastrointestinal: Negative.    Genitourinary: Negative.    Musculoskeletal: Negative.    Skin: Negative.    Neurological: Negative.    Psychiatric/Behavioral: Negative.         Physical Findings   Ht 67.25\"   Wt 222 lb (100.7 kg)   BMI 34.51 kg/m²   Physical Exam  Vitals and nursing note reviewed.   Constitutional:       General: He is not in acute distress.     Appearance: He is well-developed.   HENT:      Head: Normocephalic and atraumatic.   Eyes:      General: No scleral icterus.     Pupils: Pupils are equal, round, and reactive to light.   Neck:      Vascular: No JVD.      Trachea: No tracheal deviation.   Cardiovascular:      Rate and Rhythm: Normal rate and regular rhythm.   Pulmonary:      Effort: Pulmonary effort is normal. No respiratory distress.      Breath sounds: No stridor.   Abdominal:      General: Bowel sounds are normal. There is no distension.      Palpations: Abdomen is soft. Abdomen is not rigid. There is no mass.      Tenderness: There is no abdominal tenderness. There is no guarding or rebound. Negative signs include Edwards's sign and McBurney's sign.      Hernia: No hernia is present.   Musculoskeletal:         General: Normal range of motion.      Cervical back: Normal range of motion and neck supple.   Skin:     General: Skin is warm and dry.   Neurological:      Mental Status: He is alert and oriented to person, place, and time.   Psychiatric:         Behavior: Behavior normal.             Assessment     Gallbladder Polyp        Plan     The patient will be scheduled for laparoscopic cholecystectomy with ICG imaging.    The ramon-operative care plan was discussed with the patient, who voices understanding.  Activity and lifting recommendations were discussed in length.     The risks, benefits, and  alternatives to the procedure were explained to the patient.  The risks explained include, but are not limited to, bleeding, infection, pain wound complications, recurrence, incorrect diagnosis, injury to adjacent organs and structures. We also discussed the possibile need for further therapeutic, diagnostic, or surgical intervention.  The patient voiced understanding, and after all questions were answered to the patient's satisfaction, the patient provided willing and informed consent to proceed.     Skyler Alejandre MD

## 2024-11-22 NOTE — BRIEF OP NOTE
Pre-Operative Diagnosis: Gallbladder polyp [K82.4]     Post-Operative Diagnosis: Gallbladder polyp [K82.4]      Procedure Performed:   LAPAROSCOPIC CHOLECYSTECTOMY WITH INJECTION OF INDOCYANINE GREEN    Surgeons and Role:     * Skyler Alejandre MD - Primary    Assistant(s):  PA: Ana Lyman PA; Starla Araiza PA-C     Surgical Findings: chronic inflammation of the gallbladder      Specimen: gallbladder      Estimated Blood Loss: Blood Output: 5 mL (11/22/2024  8:52 AM)      Dictation Number:      Skyler Alejandre MD  11/22/2024  8:57 AM

## 2024-11-22 NOTE — OPERATIVE REPORT
OPERATIVE REPORT   PREOPERATIVE DIAGNOSIS: Gallbladder polyps .   POSTOPERATIVE DIAGNOSIS:  Gallbladder polyps  and Chronic Cholecystitis.   PROCEDURE PERFORMED: Laparoscopic cholecystectomy with ICG cholangiogram.   ASSISTANT: Ms Teodora PA-C.   ANESTHESIA: General endotracheal anesthesia.   Anesthesiologist.: Laura Delacruz MD  BLOOD LOSS: 5 mL.   COMPLICATIONS: None apparent.   FINDINGS:   Chroinc inflammation of the gallbladder  2. Normal biliary anatomy by ICG fluorescent imaging    DISPOSITION: The patient was awakened from anesthesia and brought to the recovery room in stable condition having tolerated the procedure without apparent complication. Needle, sponge, and instrument counts were correct at the end of the procedure.  Please note, preprocedure antibiotic and DVT prophylaxis were administered per protocol, and a time-out was performed prior to initiating operation, identifying the correct patient, procedure, and surgeon.   INDICATIONS FOR PROCEDURE: Please review the preprocedural history and physical. Briefly, the patient is a 64 year old male who now presents for cholecystectomy.  The risks, benefits, and alternatives of surgical intervention were explained to the patient in detail including but not limited to bleeding, infection, recurrence, incorrect diagnosis, injury to adjacent organs and structures, and bile duct injury requiring possible immediate or delayed reconstruction potentially by a hepatobiliary surgeon, postoperative bile leak with retained common bile duct stone, the need for post-procedure ERCP as well as the need for further therapeutic diagnostic or surgical intervention. The possibility of conversion to open procedure was also explained to the patient. The patient did voice understanding. Allpertinent questions were answered to the patient's satisfaction after which the patient provided willing and informed consent to proceed with surgery.   PROCEDURE:   NOTE:: A surgical  assistant was absolutely essential to the proper conduct of this case, particularly in the performance of the cholangiogram, as well as the careful dissection necessary in and around the hilum of the gallbladder.  Prior to dividing any structures critical view of safety is obtained confirming clear identification of the cystic duct, cystic artery and the cystic plate per current guidelines.  DESCRIPTION OF PROCEDURE: The patient was brought to the operating room, and, after the induction of general endotracheal anesthesia, the abdomen was prepped and draped in the usual sterile fashion. The umbilicus was grasped and elevated with an Allis clamp and an 11-mm incision was made in the superior aspect of the umbilicus through which a Veress needle was placed. A pneumoperitoneum was established in usual manner. Next, an 11-mm trocar and a 10-mm laparoscope were placed into the abdomen. Brief diagnostic survey revealed no other acute pathology.   Attention was then turned to the right upper quadrant. The patient was positioned with head up, right side up, and three 5-mm incisions were made in the upper abdomen, one in the subxiphoid position and two in the subcostal region, through which 5-mm trocars were placed into the abdomen. The gallbladder was grasped, elevated, and retracted.  ICG fluorescent imaging, dissection was initiated in the triangle of Calot, with the cystic duct and cystic artery being identified superior to the line of Rouviere.  Elevated critical view was obtained demonstrating cystic duct, cystic artery and cystic plate in standard fashion. Next, duct and cystic artery were secured using hemoclips.  The artery and duct were then divided using EndoShears.  The gallbladder was elevated and dissected free from the gallbladder fossa using electrocautery. Once the gallbladder was noted from the gallbladder fossa, it was extracted from the umbilical trocar site and Endo Catch bag and sent to Pathology for  specimen.  Attention was turned to achieve hemostasis on the gallbladder fossa; this was achieved with electrocautery. The right upper quadrant was then copiously irrigated until the effluent fluid was clear.  The previously placed clips on the cystic duct and cystic artery were visualized and inspected; they were well approximated, well situated, and there was no evidence of active bleeding or bile leak. At this point, the pneumoperitoneum was released, and the upper trocars were removed under vision. The laparoscope was removed from the umbilicus, as was the trocar. The fascia at the umbilicus was reapproximated using 0 PDS suture. All skin incisions were cleansed, irrigated, and injected with local anesthetic. Skin incisions were reapproximated using subcuticular 4-0 Monocryl suture.  Dermabond/skin glue was used to seal all the incisions.  The patient was awakened from anesthesia and brought to the recovery room in stable condition, having tolerated the procedure without apparent complication.  Operative findings were discussed with the patient's family immediately upon conclusion of the procedure.         Skyler Alejandre MD FACS  Trauma Medical Director, [facility] General Surgery    The 21st Century Cures Act makes medical notes like these available to patients in the interest of transparency. Please be advised this is a medical document. Medical documents are intended to carry relevant information, facts as evident, and the clinical opinion of the practitioner. The medical note is intended as peer to peer communication and may appear blunt or direct. It is written in medical language and may contain abbreviations or verbiage that are unfamiliar.    This note was prepared using Dragon Medical voice recognition dictation software. As a result, errors may occur. When identified, these errors have been corrected. While every attempt is made to correct errors during dictation, discrepancies may still  exist.

## 2024-11-27 ENCOUNTER — TELEPHONE (OUTPATIENT)
Facility: LOCATION | Age: 64
End: 2024-11-27

## 2024-11-27 NOTE — TELEPHONE ENCOUNTER
Left message to call back Patient called and was wondering what the test/biopsy results were after his surgery. Please contact patient at [phone].    Thank you.

## 2024-11-27 NOTE — TELEPHONE ENCOUNTER
Spoke to patient to let him know that there are no results yet available for  his pathology.  Patient verbalized understanding.

## 2024-12-03 ENCOUNTER — OFFICE VISIT (OUTPATIENT)
Facility: LOCATION | Age: 64
End: 2024-12-03
Payer: MEDICARE

## 2024-12-03 VITALS
SYSTOLIC BLOOD PRESSURE: 115 MMHG | DIASTOLIC BLOOD PRESSURE: 74 MMHG | BODY MASS INDEX: 35.68 KG/M2 | TEMPERATURE: 98 F | OXYGEN SATURATION: 95 % | HEART RATE: 97 BPM | WEIGHT: 230 LBS | HEIGHT: 67.25 IN

## 2024-12-03 DIAGNOSIS — Z98.890 POST-OPERATIVE STATE: Primary | ICD-10-CM

## 2024-12-03 PROCEDURE — 99024 POSTOP FOLLOW-UP VISIT: CPT

## 2024-12-03 NOTE — PROGRESS NOTES
Post Operative Visit Note       Active Problems  1. Post-operative state         Chief Complaint   Chief Complaint   Patient presents with    Post-Op     PO 11/22 w/Dr. Alejandre, LAPAROSCOPIC CHOLECYSTECTOMY WITH INJECTION OF INDOCYANINE GREEN, slight itching, no other symptoms.            History of Present Illness   64 year old male presenting to clinic for follow up visit s/p laparoscopic cholecystectomy on 11/22/2024 with Dr. Alejandre.    Patient is doing well. His wife was present with him during this visit. Patient reports mild umbilical discomfort. He is tolerating a general diet without nausea or vomiting. He denies constipation and diarrhea. He denies fever and chills.        Allergies  Beau has No Known Allergies.    Past Medical / Surgical / Social / Family History    The past medical and past surgical history have been reviewed by me today.     Past Medical History:    Anxiety    Arthritis    Back pain    COVID    Deep vein thrombosis (HCC)    Essential hypertension    High blood pressure    I take Lisinpril 10mg once daily. Dr Morales told me about 10 years ago that I could stop taking it. He says that there are other good reasons to continue with it!    Osteoarthritis    Both knees due to occupation.    Plantar fasciitis    Rotator cuff syndrome, left    Sleep apnea    Wife says I snore and have it. - not a formal diagnosis    Visual impairment    Glasses    Wears glasses     Past Surgical History:   Procedure Laterality Date    Cholecystectomy  11-    Colonoscopy      Colonoscopy with biopsy  07/19/2011    Cystoscopy,insert ureteral stent      Laparoscopic cholecystectomy  11-    Removed gallbladder due to a polyp.       The family history and social history have been reviewed by me today.    Family History   Problem Relation Age of Onset    Heart Attack Maternal Grandmother     Other (leon gehrig's disease) Maternal Grandmother     Heart Attack Paternal Grandfather     Heart Disease  Paternal Grandfather          of heart attack aged 65?    Asthma Mother     Diabetes Mother         Mom is pre-diabetic.    Hypertension Mother     Thyroid Disorder Mother     Other (cushing's syndrome) Sister      Social History     Socioeconomic History    Marital status:    Occupational History    Occupation: Damage Prevention    Tobacco Use    Smoking status: Never    Smokeless tobacco: Never    Tobacco comments:     NONE   Vaping Use    Vaping status: Never Used   Substance and Sexual Activity    Alcohol use: Not Currently     Comment: Modestly 1-3 beers monthly or none!    Drug use: Never   Other Topics Concern    Caffeine Concern No    Stress Concern No    Weight Concern Yes     Comment: I’d like to learn to eat less and lose weight.    Special Diet No    Exercise Yes    Seat Belt Yes        Current Outpatient Medications:     senna-docusate 8.6-50 MG Oral Tab, Take 1 tablet by mouth daily., Disp: 30 tablet, Rfl: 0    carBAMazepine 200 MG Oral Tab, TAKE 1& 1/2 TABLETS IN THE MORNING AND 1 TABLET AT NIGHT, Disp: 270 tablet, Rfl: 0    warfarin 5 MG Oral Tab, Take 2 tablets (10 mg total) by mouth every other day AND 2.5 tablets (12.5 mg total) every other day., Disp: 150 tablet, Rfl: 5    LISINOPRIL 10 MG Oral Tab, TAKE 1 TABLET BY MOUTH DAILY, Disp: 90 tablet, Rfl: 3    famotidine 40 MG Oral Tab, Take 1 tablet (40 mg total) by mouth daily., Disp: 90 tablet, Rfl: 3    ibuprofen 600 MG Oral Tab, Take 1 tablet (600 mg total) by mouth every 8 (eight) hours as needed for Pain., Disp: 90 tablet, Rfl: 3    Calcium Carbonate-Vitamin D 600-400 MG-UNIT Oral Tab, Take 1 tablet by mouth daily., Disp: , Rfl:     Cholecalciferol (VITAMIN D) 1000 UNITS Oral Cap, Take 2,000 Int'l Units/day by mouth daily.  , Disp: , Rfl:     oxyCODONE 5 MG Oral Tab, Take 1 tablet (5 mg total) by mouth every 6 (six) hours as needed for Pain. (Patient not taking: Reported on 12/3/2024), Disp: 20 tablet, Rfl: 0      Review of  Systems  A 10 point Review of Systems has been completed by me today and is negative except as above in the HPI.    Physical Findings   /74 (BP Location: Right arm, Patient Position: Sitting, Cuff Size: adult)   Pulse 97   Temp 98.3 °F (36.8 °C) (Temporal)   Ht 67.25\"   Wt 230 lb (104.3 kg)   SpO2 95%   BMI 35.76 kg/m²   Gen/psych: alert and oriented, cooperative, no apparent distress  Cardiovascular: regular rate  Respiratory: respirations unlabored, no wheeze  Abdominal: soft, non-tender, non-distended, no guarding/rebound  Incisions: Dermabond present. Clean, dry, intact. Healing ecchymosis present. No erythema or drainage.         Assessment/Plan  1. Post-operative state      Continue general diet as tolerated.  Continue no heavy lifting more than 20 lbs until 4 weeks past procedure date (12/20/2024).  Discussed that umbilical discomfort is common following his procedure. He also has healing ecchymosis present. He may apply warm compresses to help with both of these.  Pathology was discussed with the patient.  All questions and concerns were addressed.  Follow up as needed.        Imaging & Referrals   None    Follow Up  Return if symptoms worsen or fail to improve.    OSMEL Maynard  [facility] General Surgery  12/3/2024  11:50 AM

## 2025-01-02 DIAGNOSIS — G50.0 RIGHT TRIGEMINAL NEURALGIA: ICD-10-CM

## 2025-01-02 RX ORDER — CARBAMAZEPINE 200 MG/1
TABLET ORAL
Qty: 270 TABLET | Refills: 1 | Status: SHIPPED | OUTPATIENT
Start: 2025-01-02

## 2025-01-02 NOTE — TELEPHONE ENCOUNTER
Patient needs a refill carBAMazepine 200 MG Oral Tab and send to   90day supply       [facility] - [place], [place] - [address] [facility] [phone], [phone] [[id number]]

## 2025-01-02 NOTE — TELEPHONE ENCOUNTER
Medication: carBAMazepine 200 MG Oral Tab      Date of last refill: 10/3/24 (#225/0)  [place] 10[place]04/24     Last office visit: 4/29/24  Due back to clinic per last office note:  9months   Date next office visit scheduled:    Future Appointments   Date Time Provider Department Center   4/16/2025 11:00 AM Jose Morales MD [facility]           Last OV note recommendation:    Discussion/Plan:  Trigeminal neuralgia   Carbamazepine controlling facial pain, has side effect of mild tiredness if in relaxed situation such as watching TV  Discussed long term risk of osteoporosis, see below. Discussed other option of adding gabapenitn with evential goal wean CBZ but facial pain may come back and not be as effectively managed by gabapentin, possibility of  Carbamazepine  Lower to 300mg in the morning, and 200mg at night  Monitor INR as CBZ interacts with warfarin  Continue Vitamin D and calcium, dosing per PCP

## 2025-01-13 ENCOUNTER — TELEPHONE (OUTPATIENT)
Dept: SURGERY | Facility: CLINIC | Age: 65
End: 2025-01-13

## 2025-01-13 ENCOUNTER — OFFICE VISIT (OUTPATIENT)
Dept: SURGERY | Facility: CLINIC | Age: 65
End: 2025-01-13

## 2025-01-13 DIAGNOSIS — N35.819 OTHER STRICTURE OF URETHRA IN MALE: Primary | ICD-10-CM

## 2025-01-13 PROCEDURE — 99215 OFFICE O/P EST HI 40 MIN: CPT | Performed by: SURGERY

## 2025-01-13 NOTE — PROGRESS NOTES
Urology Clinic Note    Primary Care Provider:  Jose Morales MD     Chief Complaint:   Urethral stricture, BPH    HPI & Assessment:   Beau Hernandez Jr. is a 64 year old male with history of OA, anxiety, DVT, hypertension, YISEL who was previously seen by Dr. Friedman for congenital meatal stenosis requiring dilations.  Last OR dilation of navicularis and meatal stricture on 6/17/2020.did not tolerate clinic dilation well.  He was last seen 9/15/2022.  At that time he was performing weekly dilations with a meatal dilator.  Exam showed chronic balanitis.    More recently he has been dilating less.  He has noticed progressive slowing urinary stream and straining to void.  He has increased frequency of meatal dilation.  On exam he does have tight meatal stenosis with palpable scar tissue in the region of the fossa navicularis.    Last PSA 10/14/2024 was 1.85.    AUA symptom score is 5/1 with LUTS.    Post-void residual bladder scan: 118 mL (last void several hours prior)    Plan:   -Continue daily meatal dilations  -OR for cystoscopy, urethral dilation, Erazo placement x 1 week  -Call or proceed to ED if unable to void at all  -Discussed that eventually I would recommend referral to reconstructive urologist for more definitive intervention       PSA:  Lab Results   Component Value Date    PSA 1.69 06/04/2020    PSA 1.61 02/26/2018    PSA 3.090 10/09/2017    PSA 0.45 05/06/2011    PSAS 1.85 10/14/2024    PSAS 1.64 10/09/2023    PSAS 1.71 10/03/2022    PSAS 3.38 10/11/2021    PSAS 1.14 03/04/2019    PSAS 4.82 (H) 08/23/2017    PSAS 0.65 12/21/2013        History:     Past Medical History:    Anxiety    Arthritis    Back pain    COVID    Deep vein thrombosis (HCC)    Essential hypertension    High blood pressure    I take Lisinpril 10mg once daily. Dr Morales told me about 10 years ago that I could stop taking it. He says that there are other good reasons to continue with it!    Osteoarthritis    Both knees due to occupation.     Plantar fasciitis    Rotator cuff syndrome, left    Sleep apnea    Wife says I snore and have it. - not a formal diagnosis    Visual impairment    Glasses    Wears glasses       Past Surgical History:   Procedure Laterality Date    Cholecystectomy  2024    Colonoscopy      Colonoscopy with biopsy  2011    Cystoscopy,insert ureteral stent      Laparoscopic cholecystectomy  2024    Removed gallbladder due to a polyp.       Family History   Problem Relation Age of Onset    Heart Attack Maternal Grandmother     Other (leon gehrig's disease) Maternal Grandmother     Heart Attack Paternal Grandfather     Heart Disease Paternal Grandfather          of heart attack aged 65?    Asthma Mother     Diabetes Mother         Mom is pre-diabetic.    Hypertension Mother     Thyroid Disorder Mother     Other (cushing's syndrome) Sister        Social History     Socioeconomic History    Marital status:    Occupational History    Occupation: Damage Prevention    Tobacco Use    Smoking status: Never    Smokeless tobacco: Never    Tobacco comments:     NONE   Vaping Use    Vaping status: Never Used   Substance and Sexual Activity    Alcohol use: Not Currently     Comment: Modestly 1-3 beers monthly or none!    Drug use: Never   Other Topics Concern    Caffeine Concern No    Stress Concern No    Weight Concern Yes     Comment: I’d like to learn to eat less and lose weight.    Special Diet No    Exercise Yes    Seat Belt Yes       Medications (Active prior to today's visit):  Current Outpatient Medications   Medication Sig Dispense Refill    carBAMazepine 200 MG Oral Tab TAKE 1& 1/2 TABLETS IN THE MORNING AND 1 TABLET AT NIGHT 270 tablet 1    senna-docusate 8.6-50 MG Oral Tab Take 1 tablet by mouth daily. 30 tablet 0    warfarin 5 MG Oral Tab Take 2 tablets (10 mg total) by mouth every other day AND 2.5 tablets (12.5 mg total) every other day. 150 tablet 5    LISINOPRIL 10 MG Oral Tab TAKE 1 TABLET BY MOUTH  DAILY 90 tablet 3    famotidine 40 MG Oral Tab Take 1 tablet (40 mg total) by mouth daily. 90 tablet 3    ibuprofen 600 MG Oral Tab Take 1 tablet (600 mg total) by mouth every 8 (eight) hours as needed for Pain. 90 tablet 3    Calcium Carbonate-Vitamin D 600-400 MG-UNIT Oral Tab Take 1 tablet by mouth daily.      Cholecalciferol (VITAMIN D) 1000 UNITS Oral Cap Take 2,000 Int'l Units/day by mouth daily.           Allergies:  Allergies[1]    Review of Systems:   A comprehensive 10-point review of systems was completed.  Pertinent positives and negatives are noted in the the HPI.    Physical Exam:   CONSTITUTIONAL: Well developed, well nourished, in no acute distress  NEUROLOGIC: Alert and oriented  HEAD: Normocephalic, atraumatic  EYES: Sclera non-icteric  ENT: Hearing intact, moist mucous membranes  NECK: No obvious goiter or masses  RESPIRATORY: Normal respiratory effort  SKIN: No evident rashes  ABDOMEN: Soft, non-tender, non-distended  : As above      In total, 40 minutes were spent on this patient encounter (including chart review, patient history, physical, counseling, documentation, and communication).    Erik Avendano MD  Staff Urologist  [facility]  Office: [phone]             [1] No Known Allergies

## 2025-01-13 NOTE — TELEPHONE ENCOUNTER
Hi,    Can you please schedule this patient for surgery.    Will need urine culture 2 weeks prior to the scheduled surgery date.    Needs clearance to hold warfarin for 5-7 days prior.    Thanks,  Erik     Urology Surgery Request  Surgeon: Erik Avendano  Location (if known): [facility]  Procedure: Cystoscopy, urethral dilation  Anesthesia: General   Time Frame: ASAP  Time required: 45 minutes  Diagnosis: Urethral stricture  Special Equipment:     Antibiotics: per hospital protocol unless checked below   ___ Levaquin 500 mg IV   ___ Gemcitabine 2 g/100 mL NS bladder instillation to be given in OR    Estimated Post Op/Follow Up Appt:   1 week Erazo removal nursing visit. 3-4 week postop with me. Please schedule appointment at time of surgery scheduling.

## 2025-01-14 ENCOUNTER — TELEPHONE (OUTPATIENT)
Dept: FAMILY MEDICINE CLINIC | Facility: CLINIC | Age: 65
End: 2025-01-14

## 2025-01-14 ENCOUNTER — TELEPHONE (OUTPATIENT)
Dept: SURGERY | Facility: CLINIC | Age: 65
End: 2025-01-14

## 2025-01-14 DIAGNOSIS — N35.919 STRICTURE OF MALE URETHRA, UNSPECIFIED STRICTURE TYPE: Primary | ICD-10-CM

## 2025-01-14 DIAGNOSIS — R39.14 FEELING OF INCOMPLETE BLADDER EMPTYING: ICD-10-CM

## 2025-01-14 DIAGNOSIS — Z01.812 PRE-OPERATIVE LABORATORY EXAMINATION: ICD-10-CM

## 2025-01-15 ENCOUNTER — LAB ENCOUNTER (OUTPATIENT)
Dept: LAB | Age: 65
End: 2025-01-15
Attending: SURGERY
Payer: MEDICARE

## 2025-01-15 DIAGNOSIS — R39.14 FEELING OF INCOMPLETE BLADDER EMPTYING: ICD-10-CM

## 2025-01-15 DIAGNOSIS — N35.919 STRICTURE OF MALE URETHRA, UNSPECIFIED STRICTURE TYPE: ICD-10-CM

## 2025-01-15 PROCEDURE — 87077 CULTURE AEROBIC IDENTIFY: CPT

## 2025-01-15 PROCEDURE — 87086 URINE CULTURE/COLONY COUNT: CPT

## 2025-01-15 NOTE — TELEPHONE ENCOUNTER
Cx Order placed.   RN called patient to update that order placed  No answer. Left message.   Cysto urethral dilation scheduled on 1/22/25

## 2025-01-15 NOTE — TELEPHONE ENCOUNTER
Patient is confused, would like to speak to nursing or Dr. Morales regarding Warfarin and dvt.    Please advise.

## 2025-01-15 NOTE — TELEPHONE ENCOUNTER
Spoke with David, telling him to go for an INR 5 days after restarting Warfarin post op..    When should David restart taking Warfarin after surgery?  Routed to

## 2025-01-15 NOTE — TELEPHONE ENCOUNTER
Spoke w/patient. He wants to know when to get his next INR. Told the patient to go today before he stops taking it so we know where he is actually at and know what to start him back up on. Patient currently takes 10 mg and 12.5 mg EOD and prefers to wait until after he starts it back up again after surgery.  Patient wants to know how long to wait after surgery and restarting warfarin to go in for the INR? Please advise.

## 2025-01-16 NOTE — TELEPHONE ENCOUNTER
Spoke with David telling him he can restart Warfarin the night of surgery and he will go for an INR 5 days after restarting Warfarin per . David verbalized understanding.

## 2025-01-17 ENCOUNTER — ANESTHESIA EVENT (OUTPATIENT)
Dept: SURGERY | Facility: HOSPITAL | Age: 65
End: 2025-01-17
Payer: MEDICARE

## 2025-01-18 NOTE — PROGRESS NOTES
Sent Augmentin for Aerococcus UTI. Called patient and he will  today.    Future Appointments  1/29/2025  10:40 AM   [facility] UROLOGY NURSE       [id number]           [facility]  2/11/2025  11:15 AM   Erik Avendano MD           [id number]           [facility]  3/24/2025  1:15 PM    Mary Lou Cordova DO    [facility]            [facility]  4/16/2025  11:00 AM   Jose Morales MD           [facility] 3               [facility]

## 2025-01-20 ENCOUNTER — TELEPHONE (OUTPATIENT)
Dept: SURGERY | Facility: CLINIC | Age: 65
End: 2025-01-20

## 2025-01-20 NOTE — TELEPHONE ENCOUNTER
Pt called.  Surgery scheduled on 1-22-25.  Pt has another male issue to discuss.  Peyronies disease.   Please call pt

## 2025-01-21 ENCOUNTER — TELEPHONE (OUTPATIENT)
Dept: SURGERY | Facility: CLINIC | Age: 65
End: 2025-01-21

## 2025-01-21 NOTE — TELEPHONE ENCOUNTER
RN received a cardiac clearance faxed by ELOINA Nobles/ Surgery Scheduler from Dr Morales, PCP. Hard copy forwarded to Dr Avendano to review.

## 2025-01-21 NOTE — H&P
UROLOGY PRE-OPERATIVE HISTORY & PHYSICAL      Beau Hernandez Jr. Patient Status:  Hospital Outpatient Surgery    1960 MRN [id number]   [facility] SURGERY Attending Erik Avendano MD   Hosp Day # 0 PCP Jose Morales MD     Primary Care Provider: Jose Morales MD     Chief Complaint:   BPH, urethral stricture    History of Present Illness:   Beau Hernandez Jr. is a 64 year old male with history of OA, anxiety, DVT, hypertension, YISEL who was previously seen by Dr. Friedman for congenital meatal stenosis requiring dilations.  Last OR dilation of navicularis and meatal stricture on 2020.did not tolerate clinic dilation well.  He was last seen 9/15/2022.  At that time he was performing weekly dilations with a meatal dilator.  Exam showed chronic balanitis.     More recently he has been dilating less.  He has noticed progressive slowing urinary stream and straining to void.  He has increased frequency of meatal dilation.  On exam he does have tight meatal stenosis with palpable scar tissue in the region of the fossa navicularis.    Will schedule for urethral dilation.  Discussed eventual need for reconstructive urologist for more definitive intervention.    Last PSA 10/14/2024 was 1.85.     UCx grew Aerococcus on 2025.  Treated with Augmentin.    History:     Past Medical History:    Anxiety    pt denies anxiety history    Arthritis    Back pain    COVID    Deep vein thrombosis (HCC)    L lower leg , R lower leg     Disorder of liver    fatty liver    Essential hypertension    High blood pressure    I take Lisinpril 10mg once daily. Dr Morales told me about 10 years ago that I could stop taking it. He says that there are other good reasons to continue with it!    Osteoarthritis    Both knees due to occupation.    Plantar fasciitis    Rotator cuff syndrome, left    Sleep apnea    Wife says I snore and have it. - not a formal diagnosis/pt states diagnosed by spouse and was never diagnosed by a  medical professional    Visual impairment    Glasses    Wears glasses       Past Surgical History:   Procedure Laterality Date    Cholecystectomy  2024    Colonoscopy      Colonoscopy with biopsy  2011    Cystoscopy,insert ureteral stent      Laparoscopic cholecystectomy  2024    Removed gallbladder due to a polyp.       Family History   Problem Relation Age of Onset    Heart Attack Maternal Grandmother     Other (leon gehrig's disease) Maternal Grandmother     Heart Attack Paternal Grandfather     Heart Disease Paternal Grandfather          of heart attack aged 65?    Asthma Mother     Diabetes Mother         Mom is pre-diabetic.    Hypertension Mother     Thyroid Disorder Mother     Other (cushing's syndrome) Sister        Social History     Socioeconomic History    Marital status:    Occupational History    Occupation: Damage Prevention    Tobacco Use    Smoking status: Never    Smokeless tobacco: Never    Tobacco comments:     NONE   Vaping Use    Vaping status: Never Used   Substance and Sexual Activity    Alcohol use: Yes     Comment: a beer, rarely    Drug use: Never   Other Topics Concern    Caffeine Concern No    Stress Concern No    Weight Concern Yes     Comment: I’d like to learn to eat less and lose weight.    Special Diet No    Exercise Yes    Seat Belt Yes       Medications:  Current Outpatient Medications   Medication Sig Dispense Refill    carBAMazepine 200 MG Oral Tab TAKE 1& 1/2 TABLETS IN THE MORNING AND 1 TABLET AT NIGHT (Patient taking differently: TAKE 1& 1/2 TABLETS IN THE MORNING AND 1 & 1/2 TABLET AT NIGHT) 270 tablet 1    warfarin 5 MG Oral Tab Take 2 tablets (10 mg total) by mouth every other day AND 2.5 tablets (12.5 mg total) every other day. 150 tablet 5    LISINOPRIL 10 MG Oral Tab TAKE 1 TABLET BY MOUTH DAILY 90 tablet 3    famotidine 40 MG Oral Tab Take 1 tablet (40 mg total) by mouth daily. (Patient taking differently: Take 1 tablet (40 mg total) by  mouth daily as needed.) 90 tablet 3    ibuprofen 600 MG Oral Tab Take 1 tablet (600 mg total) by mouth every 8 (eight) hours as needed for Pain. 90 tablet 3    Calcium Carbonate-Vitamin D 600-400 MG-UNIT Oral Tab Take 1 tablet by mouth daily.      Cholecalciferol (VITAMIN D) 1000 UNITS Oral Cap Take 2,000 Int'l Units/day by mouth daily.        amoxicillin clavulanate 875-125 MG Oral Tab Take 1 tablet by mouth 2 (two) times daily. 14 tablet 0       Allergies:  Allergies[1]    Review of Systems:   A comprehensive 10-point review of systems was completed.  Pertinent positives and negatives are noted in the the HPI.    Physical Exam:   Vital Signs:  Height 5' 7.25\" (1.708 m), weight 225 lb (102.1 kg).     CONSTITUTIONAL: Well developed, well nourished, in no acute distress  NEUROLOGIC: Alert and oriented  HEAD: Normocephalic, atraumatic  EYES: Sclera non-icteric  ENT: Hearing intact, moist mucous membranes  NECK: No obvious goiter or masses  RESPIRATORY: Normal respiratory effort  SKIN: No evident rashes  ABDOMEN: Soft, non-tender, non-distended    Laboratory Data:  Lab Results   Component Value Date    WBC 5.4 10/14/2024    HGB 15.2 10/14/2024    .0 10/14/2024     Lab Results   Component Value Date     10/14/2024    K 4.2 10/14/2024     10/14/2024    CO2 28.0 10/14/2024    BUN 13 10/14/2024    GLU 97 10/14/2024    GFRAA 96 04/12/2022    AST 22 10/14/2024    ALT 26 10/14/2024    TP 6.5 10/14/2024    ALB 4.2 10/14/2024    CA 10.1 10/14/2024       Urinalysis Results (last three years):  Recent Labs     03/17/22  1040 09/15/22  1037   SPECGRAVITY 1.025 >=1.030   PHURINE 5.5 5.0   NITRITE Negative Negative       Urine Culture Results (last three years):  Lab Results   Component Value Date    URINECUL  01/15/2025     <10,000 cfu/ml Mixture of Gram positive organisms isolated - probable contamination.    URINECUL >100,000 CFU/ML Aerococcus urinae (A) 01/15/2025    URINECUL No Growth 2 Days 03/20/2020        PSA:  Lab Results   Component Value Date    PSA 1.69 06/04/2020    PSA 1.61 02/26/2018    PSA 3.090 10/09/2017    PSA 0.45 05/06/2011    PSAS 1.85 10/14/2024    PSAS 1.64 10/09/2023    PSAS 1.71 10/03/2022    PSAS 3.38 10/11/2021    PSAS 1.14 03/04/2019    PSAS 4.82 (H) 08/23/2017    PSAS 0.65 12/21/2013        Imaging (last three days):  No results found.     Assessment:   Beau Hernandez Jr. is a 64 year old male with history of OA, anxiety, DVT, hypertension, YISEL who was previously seen by Dr. Friedman for congenital meatal stenosis requiring dilations.  Last OR dilation of navicularis and meatal stricture on 6/17/2020.did not tolerate clinic dilation well.  He was last seen 9/15/2022.  At that time he was performing weekly dilations with a meatal dilator.  Exam showed chronic balanitis.     More recently he has been dilating less.  He has noticed progressive slowing urinary stream and straining to void.  He has increased frequency of meatal dilation.  On exam he does have tight meatal stenosis with palpable scar tissue in the region of the fossa navicularis.    Will schedule for urethral dilation.  Discussed eventual need for reconstructive urologist for more definitive intervention.    Last PSA 10/14/2024 was 1.85.     Plan:   - OR for cystoscopy, urethral dilation  - Informed consent obtained - risks and benefits explained, and all questions answered    I have personally reviewed all relevant medical records, labs, and imaging.     Erik Avendano MD  Staff Urologist  [facility]  Office: [phone]             [1] No Known Allergies

## 2025-01-22 ENCOUNTER — ANESTHESIA (OUTPATIENT)
Dept: SURGERY | Facility: HOSPITAL | Age: 65
End: 2025-01-22
Payer: MEDICARE

## 2025-01-22 ENCOUNTER — HOSPITAL ENCOUNTER (OUTPATIENT)
Facility: HOSPITAL | Age: 65
Setting detail: HOSPITAL OUTPATIENT SURGERY
Discharge: HOME | End: 2025-01-22
Attending: SURGERY | Admitting: SURGERY
Payer: MEDICARE

## 2025-01-22 VITALS
TEMPERATURE: 98 F | OXYGEN SATURATION: 93 % | BODY MASS INDEX: 35.68 KG/M2 | HEIGHT: 67.25 IN | RESPIRATION RATE: 16 BRPM | DIASTOLIC BLOOD PRESSURE: 69 MMHG | HEART RATE: 75 BPM | WEIGHT: 230 LBS | SYSTOLIC BLOOD PRESSURE: 107 MMHG

## 2025-01-22 PROBLEM — N35.919 URETHRAL STRICTURE: Status: ACTIVE | Noted: 2025-01-22

## 2025-01-22 LAB
INR BLD: 1.03 (ref 0.8–1.2)
PROTHROMBIN TIME: 13.7 SECONDS (ref 11.6–14.8)

## 2025-01-22 PROCEDURE — 52281 CYSTOSCOPY AND TREATMENT: CPT | Performed by: SURGERY

## 2025-01-22 PROCEDURE — 0T7D8ZZ DILATION OF URETHRA, VIA NATURAL OR ARTIFICIAL OPENING ENDOSCOPIC: ICD-10-PCS | Performed by: SURGERY

## 2025-01-22 RX ORDER — HYDROMORPHONE HYDROCHLORIDE 1 MG/ML
0.2 INJECTION, SOLUTION INTRAMUSCULAR; INTRAVENOUS; SUBCUTANEOUS EVERY 5 MIN PRN
Status: DISCONTINUED | OUTPATIENT
Start: 2025-01-22 | End: 2025-01-22

## 2025-01-22 RX ORDER — MIDAZOLAM HYDROCHLORIDE 1 MG/ML
INJECTION INTRAMUSCULAR; INTRAVENOUS AS NEEDED
Status: DISCONTINUED | OUTPATIENT
Start: 2025-01-22 | End: 2025-01-22 | Stop reason: SURG

## 2025-01-22 RX ORDER — LIDOCAINE HYDROCHLORIDE 20 MG/ML
JELLY TOPICAL AS NEEDED
Status: DISCONTINUED | OUTPATIENT
Start: 2025-01-22 | End: 2025-01-22 | Stop reason: HOSPADM

## 2025-01-22 RX ORDER — HYDROMORPHONE HYDROCHLORIDE 1 MG/ML
INJECTION, SOLUTION INTRAMUSCULAR; INTRAVENOUS; SUBCUTANEOUS
Status: COMPLETED
Start: 2025-01-22 | End: 2025-01-22

## 2025-01-22 RX ORDER — NALOXONE HYDROCHLORIDE 0.4 MG/ML
0.08 INJECTION, SOLUTION INTRAMUSCULAR; INTRAVENOUS; SUBCUTANEOUS AS NEEDED
Status: DISCONTINUED | OUTPATIENT
Start: 2025-01-22 | End: 2025-01-22

## 2025-01-22 RX ORDER — SODIUM CHLORIDE, SODIUM LACTATE, POTASSIUM CHLORIDE, CALCIUM CHLORIDE 600; 310; 30; 20 MG/100ML; MG/100ML; MG/100ML; MG/100ML
INJECTION, SOLUTION INTRAVENOUS CONTINUOUS
Status: DISCONTINUED | OUTPATIENT
Start: 2025-01-22 | End: 2025-01-22

## 2025-01-22 RX ORDER — HYDROMORPHONE HYDROCHLORIDE 1 MG/ML
0.4 INJECTION, SOLUTION INTRAMUSCULAR; INTRAVENOUS; SUBCUTANEOUS EVERY 5 MIN PRN
Status: DISCONTINUED | OUTPATIENT
Start: 2025-01-22 | End: 2025-01-22

## 2025-01-22 RX ORDER — SCOPOLAMINE 1 MG/3D
1 PATCH, EXTENDED RELEASE TRANSDERMAL ONCE
Status: DISCONTINUED | OUTPATIENT
Start: 2025-01-22 | End: 2025-01-22 | Stop reason: HOSPADM

## 2025-01-22 RX ORDER — HYDROMORPHONE HYDROCHLORIDE 1 MG/ML
0.6 INJECTION, SOLUTION INTRAMUSCULAR; INTRAVENOUS; SUBCUTANEOUS EVERY 5 MIN PRN
Status: DISCONTINUED | OUTPATIENT
Start: 2025-01-22 | End: 2025-01-22

## 2025-01-22 RX ORDER — ACETAMINOPHEN 500 MG
1000 TABLET ORAL ONCE
Status: DISCONTINUED | OUTPATIENT
Start: 2025-01-22 | End: 2025-01-22 | Stop reason: HOSPADM

## 2025-01-22 RX ADMIN — MIDAZOLAM HYDROCHLORIDE 2 MG: 1 INJECTION INTRAMUSCULAR; INTRAVENOUS at 13:47:00

## 2025-01-22 RX ADMIN — SODIUM CHLORIDE, SODIUM LACTATE, POTASSIUM CHLORIDE, CALCIUM CHLORIDE: 600; 310; 30; 20 INJECTION, SOLUTION INTRAVENOUS at 13:45:00

## 2025-01-22 NOTE — ANESTHESIA POSTPROCEDURE EVALUATION
[facility]    Beau Hernandez Inocencio Patient Status:  Hospital Outpatient Surgery   Age/Gender 64 year old male MRN [id number]   Location [facility] SURGERY Attending Erik Avendano MD   Hosp Day # 0 PCP Jose Morales MD       Anesthesia Post-op Note    CYSTOSCOPY URETHRAL DILATION    Procedure Summary       Date: 01/22/25 Room / Location:  MAIN OR 02 / [facility] MAIN OR    Anesthesia Start: 1345 Anesthesia Stop: 1429    Procedure: CYSTOSCOPY URETHRAL DILATION (Urethra) Diagnosis:       Stricture of male urethra, unspecified stricture type      (Stricture of male urethra, unspecified stricture type [N35.919])    Surgeons: Erik Avendano MD Anesthesiologist: Richard Solano MD    Anesthesia Type: general ASA Status: 2            Anesthesia Type: general    Vitals Value Taken Time   /83 01/22/25 1431   Temp 98.6 01/22/25 1431   Pulse 83 01/22/25 1431   Resp 18 01/22/25 1431   SpO2 98 01/22/25 1431           Patient Location: PACU    Anesthesia Type: general    Airway Patency: patent    Postop Pain Control: adequate    Mental Status: mildly sedated but able to meaningfully participate in the post-anesthesia evaluation    Nausea/Vomiting: none    Cardiopulmonary/Hydration status: stable euvolemic    Complications: no apparent anesthesia related complications    Postop vital signs: stable    Dental Exam: Unchanged from Preop    Patient to be discharged from PACU when criteria met.

## 2025-01-22 NOTE — DISCHARGE INSTRUCTIONS
Instructions:    - No heavy lifting or strenuous activity for 1 week.    - Your follow-up appointment is listed below. Please contact us at [phone] if you need to change your appointment.    Future Appointments   Date Time Provider Department Center   1/29/2025 10:40 AM [facility] UROLOGY NURSE [id number] [facility]   2/11/2025 11:15 AM Erik Avendano MD [id number] [facility]   3/24/2025  1:15 PM Mary Lou Cordova DO [facility]   4/16/2025 11:00 AM Jose Morales MD [facility]      - You may experience mild pain after the procedure for a few days.  If the pain becomes intolerable please contact our office or go to the nearest Emergency Room or Urgent Care. You should take over the counter ibuprofen (AKA motrin, advil) for mild pain (provided you do not have a medical condition such as stomach ulcers or kidney disease which prohibits you from taking these). You may alternate this with tylenol as well. If pain is still not relieved by tylenol and/or ibuprofen, you may take narcotic pain medication if prescribed (typically oxycodone or tramadol). If you are taking narcotic pain medication this can make you constipated, so you should take over the counter stool softeners or miralax if prescribed.    - Warm pack or hot baths often help with discomfort after cystoscopy.    - If you take blood thinners (such as aspirin or plavix) please hold these medications until 3 days after surgery.     - You may experience burning and frequency of urination over the next few days. This will improve after a few days if you stay well hydrated. If you were prescribed phenazopyridine (Pyridium) this may relieve urinary discomfort but you can only take this for 3 days. Pyridium will make your urine orange.     - You are likely to see some blood in your urine (pink or light red urine) that should clear up within a few days. Staying well hydrated should help this clear up. If you notice the urine stays dark red or there are  multiple large blood clots despite good hydration, please call the urology clinic ([phone]).     - Try to abstain from alcohol, coffee, tea, artificial sweeteners, and spicy food for the next 48 hours as these can irritate the bladder.     - If you develop fevers / chills, difficulty urinating, or abdominal pain please call the office.     - Drink 1.5 to 2 liters of fluid today (water is preferable). If you are on a fluid restriction due to other medical reasons then you need to adhere to your fluid restriction recommendations.    Erazo Catheter Care:    - You will be discharged from the hospital with a Erazo catheter in place which continuously drains urine from your bladder. Do not attempt to remove this on your own unless instructed to do so.  If it should accidentally fall out, you should notify your urologist right away.      - You may use a small amount of Vaseline or antibiotic ointment to lubricate the outside catheter where it enters the tip of your penis (the urethral meatus) if this area becomes dry or irritated.    - You will be provided with a \"stat-lock,\" a plastic clip which will be glued to your thigh to hold the catheter. This will be removed when your catheter is removed.    - You will be provided with two urine collection bags of different sizes. Please use the larger bag while at home and sleeping. You may use the smaller leg bag under your pants while outside of the house. The leg bag usually lasts about 3-4 hours before needing to be emptied, but of course this varies with how much liquid you consume.  The larger bag should last you all night, so you do not need to wake up to empty it.  Remove, empty, and exchange these two bags as needed.    - Please keep Erazo bag below the level of your bladder to allow urine to drain properly and to prevent urinary tract infection.    - Alert the surgeon if the catheter does not drain well, or if you have any other serious problems with it.      Erik  DUYEN Avendano MD  Staff Urologist  [facility]  Office: [phone]

## 2025-01-22 NOTE — ANESTHESIA PROCEDURE NOTES
Airway  Date/Time: 1/22/2025 1:52 PM  Urgency: elective      General Information and Staff    Patient location during procedure: OR  Anesthesiologist: Richard Solano MD  Performed: anesthesiologist   Performed by: Richard Solano MD  Authorized by: Richard Solano MD      Indications and Patient Condition  Indications for airway management: anesthesia  Sedation level: deep  Preoxygenated: yes  Patient position: sniffing  Mask difficulty assessment: 1 - vent by mask    Final Airway Details  Final airway type: supraglottic airway      Successful airway: classic  Size 5       Number of attempts at approach: 2

## 2025-01-22 NOTE — ANESTHESIA PREPROCEDURE EVALUATION
PRE-OP EVALUATION    Patient Name: Beau Hernandez Jr.    Admit Diagnosis: Stricture of male urethra, unspecified stricture type [N35.919]    Pre-op Diagnosis: Stricture of male urethra, unspecified stricture type [N35.919]    CYSTOSCOPY URETHRAL DILATION    Anesthesia Procedure: CYSTOSCOPY URETHRAL DILATION (Urethra)    Surgeons and Role:     * Erik Avendano MD - Primary    Pre-op vitals reviewed.  Temp: 98.1 °F (36.7 °C)  Pulse: 92  Resp: 18  BP: 150/81  SpO2: 96 %  Body mass index is 35.76 kg/m².    Current medications reviewed.  Hospital Medications:   [Transfer Hold] acetaminophen (Tylenol Extra Strength) tab 1,000 mg  1,000 mg Oral Once    [Transfer Hold] scopolamine (Transderm-Scop) 1 MG/3DAYS patch 1 patch  1 patch Transdermal Once    lactated ringers infusion   Intravenous Continuous    ceFAZolin (Ancef) 2g in 10mL IV syringe premix  2 g Intravenous Once       Outpatient Medications:   Prescriptions Prior to Admission[1]    Allergies: Patient has no known allergies.      Anesthesia Evaluation    Patient summary reviewed.    Anesthetic Complications  (-) history of anesthetic complications         GI/Hepatic/Renal                (+) liver disease                 Cardiovascular    Negative cardiovascular ROS.    Exercise tolerance: good     MET: >4      (+) hypertension                                     Endo/Other      (-) diabetes     (-) hypothyroidism  (-) hyperthyroidism                     Pulmonary                    (+) sleep apnea       Neuro/Psych                                      Past Surgical History:   Procedure Laterality Date    Cholecystectomy  11-    Colonoscopy      Colonoscopy with biopsy  07/19/2011    Cystoscopy,insert ureteral stent      Laparoscopic cholecystectomy  11-    Removed gallbladder due to a polyp.     Social History     Socioeconomic History    Marital status:    Occupational History    Occupation: Damage Prevention    Tobacco Use    Smoking status:  Never    Smokeless tobacco: Never    Tobacco comments:     NONE   Vaping Use    Vaping status: Never Used   Substance and Sexual Activity    Alcohol use: Yes     Comment: a beer, rarely    Drug use: Never   Other Topics Concern    Caffeine Concern No    Stress Concern No    Weight Concern Yes     Comment: I’d like to learn to eat less and lose weight.    Special Diet No    Exercise Yes    Seat Belt Yes     History   Drug Use Unknown     Available pre-op labs reviewed.        Lab Results   Component Value Date    INR 1.03 01/22/2025         Airway      Mallampati: II  Mouth opening: 3 FB  TM distance: 4 - 6 cm  Neck ROM: full Cardiovascular      Rhythm: regular       Dental    Dentition appears grossly intact  Dental appliance(s): partials       Pulmonary    Pulmonary exam normal.                 Other findings              ASA: 2   Plan: general  NPO status verified and     Post-procedure pain management plan discussed with surgeon and patient.      Plan/risks discussed with: patient            R/B/A were explained including but not limited to risk of aspiration, perioperative cardiac events, lip gum or teeth injury, anaphylactic reaction, corneal abrasion, PONV, sore throat. All questions answered and the patient agreed to proceed.        Present on Admission:  **None**             [1]   Medications Prior to Admission   Medication Sig Dispense Refill Last Dose/Taking    amoxicillin clavulanate 875-125 MG Oral Tab Take 1 tablet by mouth 2 (two) times daily. 14 tablet 0 1/21/2025 at  1:00 AM    carBAMazepine 200 MG Oral Tab TAKE 1& 1/2 TABLETS IN THE MORNING AND 1 TABLET AT NIGHT (Patient taking differently: TAKE 1& 1/2 TABLETS IN THE MORNING AND 1 & 1/2 TABLET AT NIGHT) 270 tablet 1 1/22/2025 at 10:30 AM    warfarin 5 MG Oral Tab Take 2 tablets (10 mg total) by mouth every other day AND 2.5 tablets (12.5 mg total) every other day. 150 tablet 5 1/12/2025    LISINOPRIL 10 MG Oral Tab TAKE 1 TABLET BY MOUTH DAILY 90  tablet 3 1/21/2025 Morning    famotidine 40 MG Oral Tab Take 1 tablet (40 mg total) by mouth daily. (Patient taking differently: Take 1 tablet (40 mg total) by mouth daily as needed.) 90 tablet 3 Taking Differently    Calcium Carbonate-Vitamin D 600-400 MG-UNIT Oral Tab Take 1 tablet by mouth daily.   1/21/2025 Morning    Cholecalciferol (VITAMIN D) 1000 UNITS Oral Cap Take 2,000 Int'l Units/day by mouth daily.     1/21/2025    ibuprofen 600 MG Oral Tab Take 1 tablet (600 mg total) by mouth every 8 (eight) hours as needed for Pain. 90 tablet 3 Unknown

## 2025-01-22 NOTE — OPERATIVE REPORT
Urology Operative Note    Attending Surgeon: Erik Avendano MD    Assistant Surgeon: None    Patient Name: Beau Hernandez Jr.    Date of Surgery: 1/22/2025    Preoperative Diagnosis: Urethral stricture    Postoperative Diagnosis: Same    Procedure Performed:   Cystoscopy  Urethral dilation    Indication:  Patient is a 64 year old male who presented with recurrent dense urethral stricture and obstructive LUTS. The patient was counseled on options, risks, and benefits and elected to undergo the above procedure. We discussed risks including, but not limited to, bleeding, infection, damage to surrounding structures, need for repeat procedure(s). The patient understood these risks and wished to proceed with surgery.    Findings:  Dense ~10 [place] stricture of the meatus and fossa navicularis, multiple ~14 [place] areas of flimsy bulbar stricture dilated up to 26 [place]. Trilobar prostate enlargement with moderately obstructive appearance.    Procedure:  The patient was taken to the operating room and a timeout was performed confirming the correct patient and procedure. The patient was prepped and draped in lithotomy position after undergoing general anesthesia. Pre-operative prophylactic antibiotics were given in the form of Ancef.    Urethral meatus dilated with Garcia sounds from 12 [place] sequentially up to 26 [place] in order to accommodate the cystoscope.  Cystoscope inserted per urethra.  Additional few areas of bulbar urethral stricture noted approximately 14-16 [place]er.  Scope bypassed these with mild resistance.  Scope inserted into the bladder.  Bladder was surveyed with a 30 and 70 degree lenses.  The bladder was normal.  The prostate showed trilobar enlargement with obstructive appearance and mild intravesical median lobe.  Wire was inserted through the scope.  Scope was removed.  Urethra again dilated up to 26 [place] with Garcia sounds to sequentially.  18 [place] tip Erazo catheter inserted over the wire  into the bladder.  Balloon inflated with 10 mL sterile water.    The patient was awoken from anesthesia and transferred to PACU in stable condition. The patient tolerated the procedure well. All instrument/supply counts were correct at the end of the case.    Specimens:   None    Estimated Blood Loss:  1 mL    Tubes/Drains:  18Fr [place] tip Erazo catheter    Complications:   None immediate    Condition from OR:  Stable    Plan:   Erazo x 1 week. Then learn daily CIC with 16 Fr catheter.      Erik Avendano MD  Staff Urologist  [facility]  Office: [phone]

## 2025-01-29 ENCOUNTER — NURSE ONLY (OUTPATIENT)
Dept: SURGERY | Facility: CLINIC | Age: 65
End: 2025-01-29
Payer: MEDICARE

## 2025-01-29 DIAGNOSIS — N35.919 STRICTURE OF MALE URETHRA, UNSPECIFIED STRICTURE TYPE: Primary | ICD-10-CM

## 2025-01-29 NOTE — PROGRESS NOTES
Pt here for zhong removal.   Pt had surgery on 1/22/25.     Pt tolerating zhong okay.   C/o pain at insertion site and tugging.   Urine draining clear, yellow urine into overnight bag.     Lido gel applied prior to removal.  Balloon deflated. Catheter removed. Tip intact.   Pt tolerated well.     Per OP notes: Pt is to CIC daily with 16fr catheter.   Instructions gone over with pt.   Samples given.     Pt will follow up as planned on 2/11/25.

## 2025-01-30 ENCOUNTER — TELEPHONE (OUTPATIENT)
Dept: SURGERY | Facility: CLINIC | Age: 65
End: 2025-01-30

## 2025-01-30 NOTE — TELEPHONE ENCOUNTER
This encounter is now closed.     RN called patient. He inquiring if he can start CIC tomorrow instead due to sore penial opening/head. Denies burning sensation. Flow is good.     Ok to start tomorrow, but need to monitor fluids intake, urine output and color and abdominal pain/discomfort. Continue with Neosporin cream application. Void often. Double void. Take time to urinate. Drink fluid mostly during the day. Sips of water with meds before bedtime. All questions answered and he agreed to plans. He will start CIC tomorrow.

## 2025-01-30 NOTE — TELEPHONE ENCOUNTER
Pt called.  Appointment yesterday 1-29-25.  Removed a catheter.  Concerns.  Questions.  Pt requesting to speak with the nurse from the appointment.  Please call

## 2025-01-31 ENCOUNTER — TELEPHONE (OUTPATIENT)
Dept: SURGERY | Facility: CLINIC | Age: 65
End: 2025-01-31

## 2025-01-31 NOTE — TELEPHONE ENCOUNTER
Per patient calling stating he needs a \"cure catheter with water packet and gripper (HM16 or 16FR)\". Per patient is requesting a two week supply of this catheter. Please advise.

## 2025-02-07 RX ORDER — IBUPROFEN 600 MG/1
600 TABLET, FILM COATED ORAL EVERY 8 HOURS PRN
Qty: 90 TABLET | Refills: 3 | Status: SHIPPED | OUTPATIENT
Start: 2025-02-07

## 2025-02-07 NOTE — TELEPHONE ENCOUNTER
Requested Prescriptions     Signed Prescriptions Disp Refills    ibuprofen 600 MG Oral Tab 90 tablet 3     Sig: Take 1 tablet (600 mg total) by mouth every 8 (eight) hours as needed for Pain.     Authorizing Provider: DEMETRIA JOLLEY     Ordering User: AMA ZAMORA      Refilled per protocol/OV notes

## 2025-02-11 ENCOUNTER — OFFICE VISIT (OUTPATIENT)
Dept: SURGERY | Facility: CLINIC | Age: 65
End: 2025-02-11
Payer: MEDICARE

## 2025-02-11 DIAGNOSIS — N35.919 STRICTURE OF MALE URETHRA, UNSPECIFIED STRICTURE TYPE: Primary | ICD-10-CM

## 2025-02-11 DIAGNOSIS — R39.14 FEELING OF INCOMPLETE BLADDER EMPTYING: ICD-10-CM

## 2025-02-11 LAB
APPEARANCE: CLEAR
BILIRUBIN: NEGATIVE
GLUCOSE (URINE DIPSTICK): NEGATIVE MG/DL
KETONES (URINE DIPSTICK): NEGATIVE MG/DL
LEUKOCYTES: NEGATIVE
MULTISTIX LOT#: ABNORMAL NUMERIC
NITRITE, URINE: NEGATIVE
PH, URINE: 5.5 (ref 4.5–8)
PROTEIN (URINE DIPSTICK): NEGATIVE MG/DL
SPECIFIC GRAVITY: 1.03 (ref 1–1.03)
URINE-COLOR: YELLOW
UROBILINOGEN,SEMI-QN: 0.2 MG/DL (ref 0–1.9)

## 2025-02-11 PROCEDURE — 99214 OFFICE O/P EST MOD 30 MIN: CPT | Performed by: SURGERY

## 2025-02-11 PROCEDURE — 81003 URINALYSIS AUTO W/O SCOPE: CPT | Performed by: SURGERY

## 2025-02-11 PROCEDURE — 51798 US URINE CAPACITY MEASURE: CPT | Performed by: SURGERY

## 2025-02-11 NOTE — PROGRESS NOTES
Urology Clinic Note    Primary Care Provider:  Jose Morales MD     Chief Complaint:   Urethral stricture    HPI & Assessment:   Baeu Hernandez Jr. is a 64 year old male with history of OA, anxiety, DVT, hypertension, YISEL who was previously seen by Dr. Friedman for congenital meatal stenosis requiring dilations.  Last OR dilation of navicularis and meatal stricture on 6/17/2020. Did not tolerate clinic dilation well.  He was last seen 9/15/2022.  At that time he was performing weekly dilations with a meatal dilator.  Exam showed chronic balanitis.     More recently he has been dilating less.  He has noticed progressive slowing urinary stream and straining to void.  He has increased frequency of meatal dilation.  On exam he does have tight meatal stenosis with palpable scar tissue in the region of the fossa navicularis.     Will schedule for urethral dilation.  Discussed eventual need for reconstructive urologist for more definitive intervention.     Last PSA 10/14/2024 was 1.85.     I brought him to the OR on 1/22/2025 for cystoscopy, urethral dilation of a dense 10 Armenian stricture of the meatus and fossa navicularis, multiple 14 Armenian areas of flimsy bulbar urethral strictures dilated as well.  Prostate showed trilobar enlargement with moderately obstructive appearance.    Erazo catheter removed after 1 week.  Instructed on daily CIC with 16 Armenian catheter.    He notes significant improvement in his urinary stream.  He is performing CIC every other day with no issues.    Post-void residual bladder scan: 5 mL    Urinalysis: Trace blood, otherwise negative    Plan:   -Continue CIC day to keep stricture open  -Return to clinic in 6 months  -Referral information given, as below, for urethroplasty evaluation in the future if stricture recurs    Dr. Hany Solano  [address] [address] [phone]    Dr. Gallo Meehan  [facility]  [address] [place] [id number]  [phone]         PSA:  Lab Results   Component Value Date    PSA 1.69 2020    PSA 1.61 2018    PSA 3.090 10/09/2017    PSA 0.45 2011    PSAS 1.85 10/14/2024    PSAS 1.64 10/09/2023    PSAS 1.71 10/03/2022    PSAS 3.38 10/11/2021    PSAS 1.14 2019    PSAS 4.82 (H) 2017    PSAS 0.65 2013        History:     Past Medical History:    Anxiety    pt denies anxiety history    Arthritis    Back pain    COVID    Deep vein thrombosis (HCC)    L lower leg , R lower leg     Disorder of liver    fatty liver    Essential hypertension    High blood pressure    I take Lisinpril 10mg once daily. Dr Morales told me about 10 years ago that I could stop taking it. He says that there are other good reasons to continue with it!    Osteoarthritis    Both knees due to occupation.    Plantar fasciitis    Rotator cuff syndrome, left    Sleep apnea    Wife says I snore and have it. - not a formal diagnosis/pt states diagnosed by spouse and was never diagnosed by a medical professional    Visual impairment    Glasses    Wears glasses       Past Surgical History:   Procedure Laterality Date    Cholecystectomy  2024    Colonoscopy      Colonoscopy with biopsy  2011    Cystoscopy,insert ureteral stent      Laparoscopic cholecystectomy  2024    Removed gallbladder due to a polyp.       Family History   Problem Relation Age of Onset    Heart Attack Maternal Grandmother     Other (leon gehrig's disease) Maternal Grandmother     Heart Attack Paternal Grandfather     Heart Disease Paternal Grandfather          of heart attack aged 65?    Asthma Mother     Diabetes Mother         Mom is pre-diabetic.    Hypertension Mother     Thyroid Disorder Mother     Other (cushing's syndrome) Sister        Social History     Socioeconomic History    Marital status:    Occupational History    Occupation: Damage Prevention    Tobacco Use    Smoking status: Never     Smokeless tobacco: Never    Tobacco comments:     NONE   Vaping Use    Vaping status: Never Used   Substance and Sexual Activity    Alcohol use: Yes     Comment: a beer, rarely    Drug use: Never   Other Topics Concern    Caffeine Concern No    Stress Concern No    Weight Concern Yes     Comment: I’d like to learn to eat less and lose weight.    Special Diet No    Exercise Yes    Seat Belt Yes       Medications (Active prior to today's visit):  Current Outpatient Medications   Medication Sig Dispense Refill    ibuprofen 600 MG Oral Tab Take 1 tablet (600 mg total) by mouth every 8 (eight) hours as needed for Pain. 90 tablet 3    carBAMazepine 200 MG Oral Tab TAKE 1& 1/2 TABLETS IN THE MORNING AND 1 TABLET AT NIGHT (Patient taking differently: TAKE 1& 1/2 TABLETS IN THE MORNING AND 1 & 1/2 TABLET AT NIGHT) 270 tablet 1    warfarin 5 MG Oral Tab Take 2 tablets (10 mg total) by mouth every other day AND 2.5 tablets (12.5 mg total) every other day. 150 tablet 5    LISINOPRIL 10 MG Oral Tab TAKE 1 TABLET BY MOUTH DAILY 90 tablet 3    famotidine 40 MG Oral Tab Take 1 tablet (40 mg total) by mouth daily. (Patient taking differently: Take 1 tablet (40 mg total) by mouth daily as needed.) 90 tablet 3    Calcium Carbonate-Vitamin D 600-400 MG-UNIT Oral Tab Take 1 tablet by mouth daily.      Cholecalciferol (VITAMIN D) 1000 UNITS Oral Cap Take 2,000 Int'l Units/day by mouth daily.           Allergies:  Allergies[1]    Review of Systems:   A comprehensive 10-point review of systems was completed.  Pertinent positives and negatives are noted in the the HPI.    Physical Exam:   CONSTITUTIONAL: Well developed, well nourished, in no acute distress  NEUROLOGIC: Alert and oriented  HEAD: Normocephalic, atraumatic  EYES: Sclera non-icteric  ENT: Hearing intact, moist mucous membranes  NECK: No obvious goiter or masses  RESPIRATORY: Normal respiratory effort  SKIN: No evident rashes  ABDOMEN: Soft, non-tender, non-distended      In  total, 30 minutes were spent on this patient encounter (including chart review, patient history, physical, counseling, documentation, and communication).    Erik Avendano MD  Staff Urologist  [facility]  Office: [phone]           [1] No Known Allergies

## 2025-02-18 ENCOUNTER — LAB ENCOUNTER (OUTPATIENT)
Dept: LAB | Age: 65
End: 2025-02-18
Attending: FAMILY MEDICINE
Payer: MEDICARE

## 2025-02-18 ENCOUNTER — ANTI-COAG VISIT (OUTPATIENT)
Dept: FAMILY MEDICINE CLINIC | Facility: CLINIC | Age: 65
End: 2025-02-18

## 2025-02-18 DIAGNOSIS — Z86.718 HISTORY OF RECURRENT DEEP VEIN THROMBOSIS (DVT): ICD-10-CM

## 2025-02-18 DIAGNOSIS — R79.89 POSITIVE D DIMER: ICD-10-CM

## 2025-02-18 DIAGNOSIS — I82.532 CHRONIC DEEP VEIN THROMBOSIS (DVT) OF POPLITEAL VEIN OF LEFT LOWER EXTREMITY (HCC): Primary | ICD-10-CM

## 2025-02-18 DIAGNOSIS — Z79.01 LONG TERM (CURRENT) USE OF ANTICOAGULANTS: ICD-10-CM

## 2025-02-18 DIAGNOSIS — Z86.718 HISTORY OF DEEP VEIN THROMBOSIS (DVT) OF LOWER EXTREMITY: ICD-10-CM

## 2025-02-18 LAB
INR BLD: 2.26 (ref 0.8–1.2)
PROTHROMBIN TIME: 25.1 SECONDS (ref 11.6–14.8)

## 2025-02-18 PROCEDURE — 36415 COLL VENOUS BLD VENIPUNCTURE: CPT

## 2025-02-18 PROCEDURE — 85610 PROTHROMBIN TIME: CPT

## 2025-03-14 ENCOUNTER — TELEPHONE (OUTPATIENT)
Dept: NEUROLOGY | Facility: CLINIC | Age: 65
End: 2025-03-14

## 2025-03-14 NOTE — TELEPHONE ENCOUNTER
Patient advised that this office does not order an abdomen US. He states he will call and find out. Thank you

## 2025-03-14 NOTE — TELEPHONE ENCOUNTER
Pt has an appt on 3/24. Normally he has an ultrasound prior to his appt. Please advise if Provider wants him to have one prior to this appt. Pt's best call back number is [phone]. Endorsed to [facility].

## 2025-03-18 ENCOUNTER — ANTI-COAG VISIT (OUTPATIENT)
Dept: FAMILY MEDICINE CLINIC | Facility: CLINIC | Age: 65
End: 2025-03-18

## 2025-03-18 ENCOUNTER — LAB ENCOUNTER (OUTPATIENT)
Dept: LAB | Age: 65
End: 2025-03-18
Attending: FAMILY MEDICINE
Payer: MEDICARE

## 2025-03-18 DIAGNOSIS — Z79.01 LONG TERM (CURRENT) USE OF ANTICOAGULANTS: ICD-10-CM

## 2025-03-18 DIAGNOSIS — Z86.718 HISTORY OF RECURRENT DEEP VEIN THROMBOSIS (DVT): ICD-10-CM

## 2025-03-18 DIAGNOSIS — R79.89 POSITIVE D DIMER: ICD-10-CM

## 2025-03-18 DIAGNOSIS — I82.532 CHRONIC DEEP VEIN THROMBOSIS (DVT) OF POPLITEAL VEIN OF LEFT LOWER EXTREMITY (HCC): Primary | ICD-10-CM

## 2025-03-18 DIAGNOSIS — Z86.718 HISTORY OF DEEP VEIN THROMBOSIS (DVT) OF LOWER EXTREMITY: ICD-10-CM

## 2025-03-18 LAB
INR BLD: 1.89 (ref 0.8–1.2)
PROTHROMBIN TIME: 21.9 SECONDS (ref 11.6–14.8)

## 2025-03-18 PROCEDURE — 85610 PROTHROMBIN TIME: CPT

## 2025-03-18 PROCEDURE — 36415 COLL VENOUS BLD VENIPUNCTURE: CPT

## 2025-03-18 NOTE — PROGRESS NOTES
Spoke with Davdi giving him Dr. Morales's instructions regarding Warfarin.     On 12.5 alt with 10, take extra 2.5 mg tab (15 mg today) and then continue the 10 alternating with 12.5  and  repeat 2 weeks. Jose Morales MD 3/18/2025       David verbalized understanding.

## 2025-03-18 NOTE — PROGRESS NOTES
On 12.5 alt with 10, take extra 2.5 mg tab (15 mg today) and then continue the 10 alternating with 12.5  and  repeat 2 weeks. Jose Morales MD 3/18/2025

## 2025-03-24 ENCOUNTER — TELEPHONE (OUTPATIENT)
Dept: SURGERY | Facility: CLINIC | Age: 65
End: 2025-03-24

## 2025-03-24 ENCOUNTER — OFFICE VISIT (OUTPATIENT)
Dept: NEUROLOGY | Facility: CLINIC | Age: 65
End: 2025-03-24
Payer: MEDICARE

## 2025-03-24 VITALS
HEART RATE: 84 BPM | BODY MASS INDEX: 36 KG/M2 | RESPIRATION RATE: 16 BRPM | WEIGHT: 234.19 LBS | DIASTOLIC BLOOD PRESSURE: 76 MMHG | SYSTOLIC BLOOD PRESSURE: 126 MMHG

## 2025-03-24 DIAGNOSIS — M85.80 OSTEOPENIA, UNSPECIFIED LOCATION: ICD-10-CM

## 2025-03-24 DIAGNOSIS — Z79.899 ON CARBAMAZEPINE THERAPY: ICD-10-CM

## 2025-03-24 DIAGNOSIS — G50.0 RIGHT TRIGEMINAL NEURALGIA: Primary | ICD-10-CM

## 2025-03-24 PROCEDURE — 99214 OFFICE O/P EST MOD 30 MIN: CPT | Performed by: OTHER

## 2025-03-24 RX ORDER — CARBAMAZEPINE 200 MG/1
TABLET ORAL
Qty: 270 TABLET | Refills: 3 | Status: SHIPPED | OUTPATIENT
Start: 2025-03-24 | End: 2025-03-24

## 2025-03-24 RX ORDER — CARBAMAZEPINE 200 MG/1
TABLET ORAL
Qty: 270 TABLET | Refills: 3 | Status: SHIPPED | OUTPATIENT
Start: 2025-03-24

## 2025-03-24 NOTE — PROGRESS NOTES
[facility]   Neurology    Beau Hernandez Jr. Patient Status:  No patient class for patient encounter    1960 MRN [id number]   Location [facility] PCP Jose Morales MD              HPI:   Beau Hernandez Jr. is a(n) 64 year old male who presents at the request of Dr. Quiroz for evaluation of facial pain. It started 4-5 years ago. Spontaneous sharp brief pain for a few seconds, then again 7 months later. In the right lower jaw region. Another year went by, with the brief pain again. Started having increased sensitivity in the right lower jaw. In 2022 had a teeth cleaning, and within 1-2 days, developed an intermittent pulsing dull pain in that area. Then 3 weeks ago in 2022 while eating lunch, he developed sensitivity to food on his right lower molar. Persisted and tooth was sensitive to any mechanical pressure. Following morning woke up with 15/10 sharp pain in that one molar. Steady for 2 minutes, would subside 20 seconds, then pulse on and off. Subsided, but was still very sensitive. Any time he would touch the tooth, it would trigger the severe pain. Has pain with chewing, talking, and brushing. Saw a few dental specialists. At first thought it was traumatic occlusion/having to do with tooth height. Had tooth ground down, but symptoms persisted. Then had root canal done to remove the nerve leading to the nerve. No improvement. Also saw oral surgeon. Also had a block at the angle of the jaw, likely facial nerve, which did not improve his symptoms. Only had relief with anesthetic given to gum beneath the tooth. Had a panoramic CT scan which showed no structural lesions. Now has constant dull pain. With mechanical pressure, it is severe pain that lasts 2-3 minutes.   Interim  Pt states is doing better. Pt states does have concerns about side effects of medication Carbamazepine. Would like to go back down on medication. Initially 200mg BID was  helpful for the right lower facial pain, but then still noted intermittent sensitivity. I recommended increase to 300mg BID, which he did, and it helped significantly. He had covid and a LLE DVT, and was started on apixaban.   Interim  Facial pain is unchanged. Only occasionally gets a right facial twinge. Taking Calcium and Vitamin D.   Interim  Since last visit, patient reports no facial pain. Once a few months ago, had itching on the right facial region, for 20 minutes. Had Dexa scan. Reports gets sleepy during the day on CBZ. Taking 300mg BID.  Interim  Since last visit, denies facial pain. Did not try lowering CBZ to 300/200mg. Has cut out dairy. Is working with a nutritionist.      Pertinent imaging and laboratory work-up:   Component      Latest Ref Rng 10/14/2024   Glucose      70 - 99 mg/dL 97    Sodium      136 - 145 mmol/L 138    Potassium      3.5 - 5.1 mmol/L 4.2    Chloride      98 - 112 mmol/L 105    Carbon Dioxide, Total      21.0 - 32.0 mmol/L 28.0    ANION GAP      0 - 18 mmol/L 5    BUN      9 - 23 mg/dL 13    CREATININE      0.70 - 1.30 mg/dL 0.89    CALCIUM      8.7 - 10.4 mg/dL 10.1    CALCULATED OSMOLALITY      275 - 295 mOsm/kg 286    EGFR      >=60 mL/min/1.73m2 96    AST (SGOT)      <34 U/L 22    ALT (SGPT)      10 - 49 U/L 26    ALKALINE PHOSPHATASE      45 - 117 U/L 95    Total Bilirubin      0.2 - 1.1 mg/dL 0.6    PROTEIN, TOTAL      5.7 - 8.2 g/dL 6.5        Component      Latest Ref Rng 4/9/2024   Patient Fasting for CMP? Yes    HEMOGLOBIN A1c      <5.7 % 5.6    ESTIMATED AVERAGE GLUCOSE      68 - 126 mg/dL 114        10/2023 Dexa scan  CONCLUSION:  Osteopenia throughout      The World Health Organization has defined the following categories based on bone density:   Normal bone:  T-score better than -1   Osteopenia: T-score between -1 and -2.5   Osteoporosis:  T-score less than -2.5     8/2023- AST/ALT wnl, Cr 0.90  MRI/MRA head  MRA BRAIN   The upper cervical, petrous, cavernous, and  supraclinoid internal carotid arteries are unremarkable.         An anterior communicating artery is seen.         The branches of the anterior cerebral and middle cerebral arteries are unremarkable.         A small bilateral posterior communicating arteries are seen.  The branches of the posterior cerebral and superior cerebellar arteries are unremarkable.         The basilar artery has a normal course and caliber.         The bilateral vertebral arteries are unremarkable.            Impression   CONCLUSION:  Overall unremarkable MRI trigeminal protocol.            Component      Latest Ref Rng 1/26/2023 4/7/2023   CREATININE      0.70 - 1.30 mg/dL 0.95  0.90    CALCIUM      8.5 - 10.1 mg/dL 9.9  9.6    CALCULATED OSMOLALITY      275 - 295 mOsm/kg 287  286    eGFR-Cr      >=60 mL/min/1.73m2 90  97    AST (SGOT)      15 - 37 U/L 24  27    ALT (SGPT)      16 - 61 U/L 26  40    ALKALINE PHOSPHATASE      45 - 117 U/L 120 (H)  133 (H)    Total Bilirubin      0.1 - 2.0 mg/dL 0.4  0.4    PROTEIN, TOTAL      6.4 - 8.2 g/dL 7.1  7.2    Albumin      3.4 - 5.0 g/dL 3.6  3.5    Globulin      2.8 - 4.4 g/dL 3.5  3.7    A/G Ratio      1.0 - 2.0  1.0  0.9 (L)    Patient Fasting for CMP? Yes  Yes           Past Medical History:  Past Medical History:    Anxiety    pt denies anxiety history    Arthritis    Back pain    COVID    Deep vein thrombosis (HCC)    L lower leg 2023, R lower leg 2024    Disorder of liver    fatty liver    Essential hypertension    High blood pressure    I take Lisinpril 10mg once daily. Dr Morales told me about 10 years ago that I could stop taking it. He says that there are other good reasons to continue with it!    Osteoarthritis    Both knees due to occupation.    Plantar fasciitis    Rotator cuff syndrome, left    Sleep apnea    Wife says I snore and have it. - not a formal diagnosis/pt states diagnosed by spouse and was never diagnosed by a medical professional    Visual impairment    Glasses    Wears  glasses        Past Surgical History:  Past Surgical History:   Procedure Laterality Date    Cholecystectomy  11-    Colonoscopy      Colonoscopy with biopsy  07/19/2011    Cystoscopy,insert ureteral stent      Laparoscopic cholecystectomy  11-    Removed gallbladder due to a polyp.       Family History:  family history includes Asthma in his mother; Diabetes in his mother; Heart Attack in his maternal grandmother and paternal grandfather; Heart Disease in his paternal grandfather; Hypertension in his mother; Thyroid Disorder in his mother; cushing's syndrome in his sister; leon gehrig's disease in his maternal grandmother.      Social History:   reports that he has never smoked. He has never used smokeless tobacco. He reports that he does not currently use alcohol. He reports that he does not use drugs.    Allergies:  No Known Allergies    MEDICATIONS:    Current Outpatient Medications:     carBAMazepine 200 MG Oral Tab, TAKE 1& 1/2 TABLETS IN THE MORNING AND 1& 1/2 TABLETs AT NIGHT, Disp: 270 tablet, Rfl: 3    ibuprofen 600 MG Oral Tab, Take 1 tablet (600 mg total) by mouth every 8 (eight) hours as needed for Pain., Disp: 90 tablet, Rfl: 3    warfarin 5 MG Oral Tab, Take 2 tablets (10 mg total) by mouth every other day AND 2.5 tablets (12.5 mg total) every other day., Disp: 150 tablet, Rfl: 5    LISINOPRIL 10 MG Oral Tab, TAKE 1 TABLET BY MOUTH DAILY, Disp: 90 tablet, Rfl: 3    famotidine 40 MG Oral Tab, Take 1 tablet (40 mg total) by mouth daily., Disp: 90 tablet, Rfl: 3    Calcium Carbonate-Vitamin D 600-400 MG-UNIT Oral Tab, Take 1 tablet by mouth daily., Disp: , Rfl:     Cholecalciferol (VITAMIN D) 1000 UNITS Oral Cap, Take 2,000 Int'l Units/day by mouth daily.  , Disp: , Rfl:       Review of Systems:   A comprehensive 10 point review of systems was completed.     Pertinent positives and negatives noted in the HPI.         PHYSICAL EXAM:   Neurologic Exam  Vitals  Vitals:    03/24/25 1322   BP:  126/76   Pulse: 84   Resp: 16       General Appearance: Patient is a 64 year old male in no acute distress  Cardiac: Normal rate & regular rhythm  Skin: There are no rashes or other skin lesions.  Musculoskeletal: There is no scoliosis, or joint deformities  Neurologic examination:  Mental status: Patient is alert, attentive, and oriented x 3. Language is coherent and fluent without aphasia. Memory, comprehension and ability to follow commands were intact.   Cranial nerves II-XII: Optic discs were sharp. Pupils were round and reacted to light. Extraocular movements were full. There was no face, jaw, palate or tongue weakness or atrophy. Facial sensation was normal. Hearing was grossly intact. Shoulder shrug was normal.   Motor exam revealed normal muscle bulk and tone. No atrophy or fasciculations. Manual muscle testing revealed MRC grade 5/5 strength throughout including proximal and distal muscles of the arms and legs.  Deep tendon reflexes were 2 at the biceps, brachioradialis, triceps, knee jerk, and ankle jerk. Plantar responses were flexor bilaterally.   Sensory exam revealed normal light touch perception. Vibratory perception and proprioception were intact at the toes. Pinprick and temperature were normal. Romberg sign was absent.  Complex motor skills revealed normal coordination. Finger-nose-finger intact.   Gait was narrow and stable, was able to walk on heels, toes and tandem without any difficulty.     ASSESSMENT/ACTIVE PROBLEM LIST:     Encounter Diagnoses   Name Primary?    Right trigeminal neuralgia Yes    On carbamazepine therapy     Osteopenia, unspecified location        Discussion/Plan:  Trigeminal neuralgia   Had increase in facial pain when lowered Carbamazepine CBZ to 200mg BID in the past  Did not want to try yet 300/200mg   Continue Carbamazepine 300mg BID  Discussed long term risk of osteoporosis, see below. Discussed other option of adding gabapenitn with evential goal wean CBZ but facial  pain may come back and not be as effectively managed by gabapentin. Patient would like to stay on CBZ.  Monitor INR as CBZ interacts with warfarin  Continue Vit D 2400 units  Continue Vitamin D and calcium    Osteopenia-   Discussed that CBZ can increase the risk of osteoporosis  PCP recommended calcium and weight bearing exercises  Repeat DEXA scan- timing to be determined by PCP  Start doing weight bearing exercises  Check Vitamin D level  Continue Vit D 2400 units for now  Continue Calcium 600mg daily. However, since cut out dairy, check if need higher calcium supplement.     Requested Prescriptions     Signed Prescriptions Disp Refills    carBAMazepine 200 MG Oral Tab 270 tablet 3     Sig: TAKE 1& 1/2 TABLETS IN THE MORNING AND 1& 1/2 TABLETs AT NIGHT          We discussed in depth regarding the diagnosis, prognosis, treatment. The patient was given ample opportunity to ask questions. All questions and concerns were addressed.       Jagruti Cordova,   Neuromuscular and General Neurology  [facility]

## 2025-03-24 NOTE — PATIENT INSTRUCTIONS
Refill policies:    Allow 2-3 business days for refills; controlled substances may take longer.  Contact your pharmacy at least 5 days prior to running out of medication and have them send an electronic request or submit request through the “request refill” option in your [employer] account.  Refills are not addressed on weekends; covering physicians do not authorize routine medications on weekends.  No narcotics or controlled substances are refilled after noon on Fridays or by on call physicians.  By law, narcotics must be electronically prescribed.  A 30 day supply with no refills is the maximum allowed.  If your prescription is due for a refill, you may be due for a follow up appointment.  To best provide you care, patients receiving routine medications need to be seen at least once a year.  Patients receiving narcotic/controlled substance medications need to be seen at least once every 3 months.  In the event that your preferred pharmacy does not have the requested medication in stock (e.g. Backordered), it is your responsibility to find another pharmacy that has the requested medication available.  We will gladly send a new prescription to that pharmacy at your request.    Scheduling Tests:    If your physician has ordered radiology tests such as MRI or CT scans, please contact Central Scheduling at [phone] right away to schedule the test.  Once scheduled, the [facility] Centralized Referral Team will work with your insurance carrier to obtain pre-certification or prior authorization.  Depending on your insurance carrier, approval may take 3-10 days.  It is highly recommended patients assure they have received an authorization before having a test performed.  If test is done without insurance authorization, patient may be responsible for the entire amount billed.      Precertification and Prior Authorizations:  If your physician has recommended that you have a procedure or additional testing performed the [facility]  Centralized Referral Team will contact your insurance carrier to obtain pre-certification or prior authorization.    You are strongly encouraged to contact your insurance carrier to verify that your procedure/test has been approved and is a COVERED benefit.  Although the [facility] Centralized Referral Team does its due diligence, the insurance carrier gives the disclaimer that \"Although the procedure is authorized, this does not guarantee payment.\"    Ultimately the patient is responsible for payment.   Thank you for your understanding in this matter.  Paperwork Completion:  If you require FMLA or disability paperwork for your recovery, please make sure to either drop it off or have it faxed to our office at [phone]. Be sure the form has your name and date of birth on it.  The form will be faxed to our Forms Department and they will complete it for you.  There is a 25$ fee for all forms that need to be filled out.  Please be aware there is a 10-14 day turnaround time.  You will need to sign a release of information (RONI) form if your paperwork does not come with one.  You may call the Forms Department with any questions at [phone].  Their fax number is [phone].          Please ask PCP to order DEXA scan (bone mineral density)- timing to be determined.    Since cut out dairy, check if need higher calcium supplement.

## 2025-03-24 NOTE — TELEPHONE ENCOUNTER
RN received a form to be completed/signed regarding the catheter order.  Forms completed and faxed back to [facility]

## 2025-03-31 ENCOUNTER — LAB ENCOUNTER (OUTPATIENT)
Dept: LAB | Age: 65
End: 2025-03-31
Attending: Other
Payer: MEDICARE

## 2025-03-31 ENCOUNTER — ANTI-COAG VISIT (OUTPATIENT)
Dept: FAMILY MEDICINE CLINIC | Facility: CLINIC | Age: 65
End: 2025-03-31

## 2025-03-31 DIAGNOSIS — Z86.718 HISTORY OF RECURRENT DEEP VEIN THROMBOSIS (DVT): ICD-10-CM

## 2025-03-31 DIAGNOSIS — R79.89 POSITIVE D DIMER: ICD-10-CM

## 2025-03-31 DIAGNOSIS — Z86.718 HISTORY OF DEEP VEIN THROMBOSIS (DVT) OF LOWER EXTREMITY: ICD-10-CM

## 2025-03-31 DIAGNOSIS — I82.532 CHRONIC DEEP VEIN THROMBOSIS (DVT) OF POPLITEAL VEIN OF LEFT LOWER EXTREMITY (HCC): Primary | ICD-10-CM

## 2025-03-31 DIAGNOSIS — Z79.899 ON CARBAMAZEPINE THERAPY: ICD-10-CM

## 2025-03-31 DIAGNOSIS — Z79.01 LONG TERM (CURRENT) USE OF ANTICOAGULANTS: ICD-10-CM

## 2025-03-31 LAB
INR BLD: 2.41 (ref 0.8–1.2)
PROTHROMBIN TIME: 26.4 SECONDS (ref 11.6–14.8)
VIT D+METAB SERPL-MCNC: 39.4 NG/ML (ref 30–100)

## 2025-03-31 PROCEDURE — 36415 COLL VENOUS BLD VENIPUNCTURE: CPT

## 2025-03-31 PROCEDURE — 82306 VITAMIN D 25 HYDROXY: CPT

## 2025-03-31 PROCEDURE — 85610 PROTHROMBIN TIME: CPT

## 2025-04-07 ENCOUNTER — TELEPHONE (OUTPATIENT)
Dept: FAMILY MEDICINE CLINIC | Facility: CLINIC | Age: 65
End: 2025-04-07

## 2025-04-07 DIAGNOSIS — I10 ESSENTIAL HYPERTENSION: ICD-10-CM

## 2025-04-07 DIAGNOSIS — R73.03 PREDIABETES: ICD-10-CM

## 2025-04-07 DIAGNOSIS — E78.5 DYSLIPIDEMIA: ICD-10-CM

## 2025-04-07 DIAGNOSIS — Z79.899 LONG-TERM USE OF HIGH-RISK MEDICATION: ICD-10-CM

## 2025-04-07 DIAGNOSIS — Z13.0 SCREENING FOR IRON DEFICIENCY ANEMIA: ICD-10-CM

## 2025-04-07 DIAGNOSIS — Z13.29 SCREENING FOR THYROID DISORDER: ICD-10-CM

## 2025-04-07 DIAGNOSIS — Z51.81 THERAPEUTIC DRUG MONITORING: ICD-10-CM

## 2025-04-07 DIAGNOSIS — E07.9 THYROID DISORDER: ICD-10-CM

## 2025-04-07 DIAGNOSIS — Z13.6 SCREENING FOR CARDIOVASCULAR CONDITION: ICD-10-CM

## 2025-04-07 DIAGNOSIS — Z13.1 SCREENING FOR DIABETES MELLITUS: ICD-10-CM

## 2025-04-07 NOTE — TELEPHONE ENCOUNTER
1. Prediabetes  Overview:  .A1c 5.8 in 2021  Orders:  -     Lipid Panel; Future; Expected date: 04/07/2025  -     TSH W Reflex To Free T4; Future; Expected date: 04/07/2025  -     Hemoglobin A1C; Future; Expected date: 04/07/2025  2. Screening for cardiovascular condition  -     Lipid Panel; Future; Expected date: 04/07/2025  3. Dyslipidemia  -     Lipid Panel; Future; Expected date: 04/07/2025  -     TSH W Reflex To Free T4; Future; Expected date: 04/07/2025  -     Comp Metabolic Panel (14); Future; Expected date: 04/07/2025  4. Screening for thyroid disorder  -     TSH W Reflex To Free T4; Future; Expected date: 04/07/2025  5. Thyroid disorder  -     TSH W Reflex To Free T4; Future; Expected date: 04/07/2025  6. Essential hypertension  Overview:  Lisinopril 10  Orders:  -     TSH W Reflex To Free T4; Future; Expected date: 04/07/2025  -     CBC With Differential With Platelet; Future; Expected date: 04/07/2025  7. Screening for iron deficiency anemia  8. Long-term use of high-risk medication  -     Carbamazepine (Tegretol) Total; Future; Expected date: 04/07/2025  -     Comp Metabolic Panel (14); Future; Expected date: 04/07/2025  9. Therapeutic drug monitoring  -     Carbamazepine (Tegretol) Total; Future; Expected date: 04/07/2025  10. Screening for diabetes mellitus  -     Hemoglobin A1C; Future; Expected date: 04/07/2025  -     Comp Metabolic Panel (14); Future; Expected date: 04/07/2025       OK to notify. Thanks, Nahid Morales MD

## 2025-04-14 ENCOUNTER — LAB ENCOUNTER (OUTPATIENT)
Dept: LAB | Age: 65
End: 2025-04-14
Attending: FAMILY MEDICINE
Payer: MEDICARE

## 2025-04-14 ENCOUNTER — ANTI-COAG VISIT (OUTPATIENT)
Dept: FAMILY MEDICINE CLINIC | Facility: CLINIC | Age: 65
End: 2025-04-14

## 2025-04-14 DIAGNOSIS — E07.9 THYROID DISORDER: ICD-10-CM

## 2025-04-14 DIAGNOSIS — Z79.01 LONG TERM (CURRENT) USE OF ANTICOAGULANTS: ICD-10-CM

## 2025-04-14 DIAGNOSIS — Z13.6 SCREENING FOR CARDIOVASCULAR CONDITION: ICD-10-CM

## 2025-04-14 DIAGNOSIS — Z86.718 HISTORY OF RECURRENT DEEP VEIN THROMBOSIS (DVT): ICD-10-CM

## 2025-04-14 DIAGNOSIS — Z51.81 THERAPEUTIC DRUG MONITORING: ICD-10-CM

## 2025-04-14 DIAGNOSIS — I82.532 CHRONIC DEEP VEIN THROMBOSIS (DVT) OF POPLITEAL VEIN OF LEFT LOWER EXTREMITY (HCC): Primary | ICD-10-CM

## 2025-04-14 DIAGNOSIS — Z79.899 LONG-TERM USE OF HIGH-RISK MEDICATION: ICD-10-CM

## 2025-04-14 DIAGNOSIS — R73.03 PREDIABETES: ICD-10-CM

## 2025-04-14 DIAGNOSIS — R79.89 POSITIVE D DIMER: ICD-10-CM

## 2025-04-14 DIAGNOSIS — E78.5 DYSLIPIDEMIA: ICD-10-CM

## 2025-04-14 DIAGNOSIS — I10 ESSENTIAL HYPERTENSION: ICD-10-CM

## 2025-04-14 DIAGNOSIS — Z13.1 SCREENING FOR DIABETES MELLITUS: ICD-10-CM

## 2025-04-14 DIAGNOSIS — Z86.718 HISTORY OF DEEP VEIN THROMBOSIS (DVT) OF LOWER EXTREMITY: ICD-10-CM

## 2025-04-14 DIAGNOSIS — Z13.29 SCREENING FOR THYROID DISORDER: ICD-10-CM

## 2025-04-14 LAB
ALBUMIN SERPL-MCNC: 4.4 G/DL (ref 3.2–4.8)
ALBUMIN/GLOB SERPL: 1.8 {RATIO} (ref 1–2)
ALP LIVER SERPL-CCNC: 106 U/L (ref 45–117)
ALT SERPL-CCNC: 25 U/L (ref 10–49)
ANION GAP SERPL CALC-SCNC: 10 MMOL/L (ref 0–18)
AST SERPL-CCNC: 20 U/L (ref ?–34)
BASOPHILS # BLD AUTO: 0.03 X10(3) UL (ref 0–0.2)
BASOPHILS NFR BLD AUTO: 0.5 %
BILIRUB SERPL-MCNC: 0.6 MG/DL (ref 0.2–1.1)
BUN BLD-MCNC: 15 MG/DL (ref 9–23)
CALCIUM BLD-MCNC: 9.9 MG/DL (ref 8.7–10.6)
CARBAMAZEPINE SERPL-MCNC: 8.8 UG/ML (ref 4–12)
CHLORIDE SERPL-SCNC: 104 MMOL/L (ref 98–112)
CHOLEST SERPL-MCNC: 235 MG/DL (ref ?–200)
CO2 SERPL-SCNC: 24 MMOL/L (ref 21–32)
CREAT BLD-MCNC: 1.04 MG/DL (ref 0.7–1.3)
EGFRCR SERPLBLD CKD-EPI 2021: 80 ML/MIN/1.73M2 (ref 60–?)
EOSINOPHIL # BLD AUTO: 0.24 X10(3) UL (ref 0–0.7)
EOSINOPHIL NFR BLD AUTO: 4.2 %
ERYTHROCYTE [DISTWIDTH] IN BLOOD BY AUTOMATED COUNT: 13.7 %
EST. AVERAGE GLUCOSE BLD GHB EST-MCNC: 114 MG/DL (ref 68–126)
FASTING PATIENT LIPID ANSWER: YES
FASTING STATUS PATIENT QL REPORTED: YES
GLOBULIN PLAS-MCNC: 2.4 G/DL (ref 2–3.5)
GLUCOSE BLD-MCNC: 98 MG/DL (ref 70–99)
HBA1C MFR BLD: 5.6 % (ref ?–5.7)
HCT VFR BLD AUTO: 45.9 % (ref 39–53)
HDLC SERPL-MCNC: 60 MG/DL (ref 40–59)
HGB BLD-MCNC: 15.7 G/DL (ref 13–17.5)
IMM GRANULOCYTES # BLD AUTO: 0.01 X10(3) UL (ref 0–1)
IMM GRANULOCYTES NFR BLD: 0.2 %
INR BLD: 2.18 (ref 0.8–1.2)
LDLC SERPL CALC-MCNC: 145 MG/DL (ref ?–100)
LYMPHOCYTES # BLD AUTO: 2.27 X10(3) UL (ref 1–4)
LYMPHOCYTES NFR BLD AUTO: 39.9 %
MCH RBC QN AUTO: 32.4 PG (ref 26–34)
MCHC RBC AUTO-ENTMCNC: 34.2 G/DL (ref 31–37)
MCV RBC AUTO: 94.6 FL (ref 80–100)
MONOCYTES # BLD AUTO: 0.62 X10(3) UL (ref 0.1–1)
MONOCYTES NFR BLD AUTO: 10.9 %
NEUTROPHILS # BLD AUTO: 2.52 X10 (3) UL (ref 1.5–7.7)
NEUTROPHILS # BLD AUTO: 2.52 X10(3) UL (ref 1.5–7.7)
NEUTROPHILS NFR BLD AUTO: 44.3 %
NONHDLC SERPL-MCNC: 175 MG/DL (ref ?–130)
OSMOLALITY SERPL CALC.SUM OF ELEC: 287 MOSM/KG (ref 275–295)
PLATELET # BLD AUTO: 231 10(3)UL (ref 150–450)
POTASSIUM SERPL-SCNC: 4.1 MMOL/L (ref 3.5–5.1)
PROT SERPL-MCNC: 6.8 G/DL (ref 5.7–8.2)
PROTHROMBIN TIME: 24.5 SECONDS (ref 11.6–14.8)
RBC # BLD AUTO: 4.85 X10(6)UL (ref 4.3–5.7)
SODIUM SERPL-SCNC: 138 MMOL/L (ref 136–145)
TRIGL SERPL-MCNC: 167 MG/DL (ref 30–149)
TSI SER-ACNC: 3.11 UIU/ML (ref 0.55–4.78)
VLDLC SERPL CALC-MCNC: 31 MG/DL (ref 0–30)
WBC # BLD AUTO: 5.7 X10(3) UL (ref 4–11)

## 2025-04-14 PROCEDURE — 36415 COLL VENOUS BLD VENIPUNCTURE: CPT

## 2025-04-14 PROCEDURE — 85610 PROTHROMBIN TIME: CPT

## 2025-04-14 PROCEDURE — 85025 COMPLETE CBC W/AUTO DIFF WBC: CPT

## 2025-04-14 PROCEDURE — 80053 COMPREHEN METABOLIC PANEL: CPT

## 2025-04-14 PROCEDURE — 83036 HEMOGLOBIN GLYCOSYLATED A1C: CPT

## 2025-04-14 PROCEDURE — 80061 LIPID PANEL: CPT

## 2025-04-14 PROCEDURE — 84443 ASSAY THYROID STIM HORMONE: CPT

## 2025-04-14 PROCEDURE — 80156 ASSAY CARBAMAZEPINE TOTAL: CPT

## 2025-04-15 NOTE — ASSESSMENT & PLAN NOTE
Seen 3.2023, on eliquis after COVID related DVT with recurrence in later 2024 after off meds for 6 months. Seen hematolgy.

## 2025-04-15 NOTE — ASSESSMENT & PLAN NOTE
BP shows good control with last BP of 130/78. Continue lifestyle changes, diet, exercise and weight loss.   4/14/2025: Potassium 4.1; Creatinine 1.04; eGFR-Cr 80  BP Meds: lisinopril Tabs - 10 MG   ACE/ARB Adherence Excellent at 100%

## 2025-04-15 NOTE — ASSESSMENT & PLAN NOTE
Cholesterol shows Good control. Long term heart-healthy diet and lifestyle discussed and encouraged to reduce risk of cardiovascular disease.  4/14/2025: Cholesterol, Total 235 (H); HDL Cholesterol 60 (H); Triglycerides 167 (H); LDL Cholesterol 145 (H)  No current Cholesterol medication. stable  Continue with current treatment plan

## 2025-04-15 NOTE — ASSESSMENT & PLAN NOTE
4/14/2025: HgbA1C 5.6; Glucose 98 Sugar control is stable  Continue with current treatment plan  Pre-Diabetic. Not currently on Diabetic meds.

## 2025-04-16 ENCOUNTER — OFFICE VISIT (OUTPATIENT)
Dept: FAMILY MEDICINE CLINIC | Facility: CLINIC | Age: 65
End: 2025-04-16
Payer: MEDICARE

## 2025-04-16 VITALS
SYSTOLIC BLOOD PRESSURE: 130 MMHG | WEIGHT: 230.81 LBS | OXYGEN SATURATION: 97 % | HEIGHT: 67.25 IN | BODY MASS INDEX: 35.8 KG/M2 | RESPIRATION RATE: 14 BRPM | DIASTOLIC BLOOD PRESSURE: 78 MMHG | HEART RATE: 84 BPM

## 2025-04-16 DIAGNOSIS — Z79.899 ENCOUNTER FOR LONG-TERM (CURRENT) USE OF HIGH-RISK MEDICATION: ICD-10-CM

## 2025-04-16 DIAGNOSIS — K21.9 GASTROESOPHAGEAL REFLUX DISEASE WITHOUT ESOPHAGITIS: ICD-10-CM

## 2025-04-16 DIAGNOSIS — I82.532 CHRONIC DEEP VEIN THROMBOSIS (DVT) OF POPLITEAL VEIN OF LEFT LOWER EXTREMITY (HCC): Primary | ICD-10-CM

## 2025-04-16 DIAGNOSIS — E78.2 MIXED HYPERLIPIDEMIA: ICD-10-CM

## 2025-04-16 DIAGNOSIS — G50.0 TRIGEMINAL NEURALGIA: ICD-10-CM

## 2025-04-16 DIAGNOSIS — I10 ESSENTIAL HYPERTENSION: ICD-10-CM

## 2025-04-16 DIAGNOSIS — K82.4 GALLBLADDER POLYP: ICD-10-CM

## 2025-04-16 DIAGNOSIS — R73.03 PREDIABETES: ICD-10-CM

## 2025-04-16 DIAGNOSIS — K76.0 NONALCOHOLIC FATTY LIVER DISEASE: ICD-10-CM

## 2025-04-16 DIAGNOSIS — M85.88 OSTEOPENIA OF LUMBAR SPINE: ICD-10-CM

## 2025-04-16 PROCEDURE — G2211 COMPLEX E/M VISIT ADD ON: HCPCS | Performed by: FAMILY MEDICINE

## 2025-04-16 PROCEDURE — 99214 OFFICE O/P EST MOD 30 MIN: CPT | Performed by: FAMILY MEDICINE

## 2025-04-16 RX ORDER — FAMOTIDINE 40 MG/1
40 TABLET, FILM COATED ORAL DAILY
Qty: 90 TABLET | Refills: 3 | Status: SHIPPED | OUTPATIENT
Start: 2025-04-16

## 2025-04-16 NOTE — PROGRESS NOTES
The following individual(s) verbally consented to be recorded using ambient AI listening technology and understand that they can each withdraw their consent to this listening technology at any point by asking the clinician to turn off or pause the recording:    Patient name: Beau Hernandez Jr.

## 2025-04-16 NOTE — PROGRESS NOTES
Subjective:   David is a 64 year old male coming in for had concerns including Blood Pressure (6 months follow up ), Lab (Needs a dexa scan ), and Medication Request (Discuss about getting a higher calcium supplement, no longer doing dairy  ).   HPI  History of Present Illness  David Hernandez Jr. is a 64 year old male who presents for a follow-up on his blood work and concerns about fatty liver.    He is concerned about his blood work, particularly his vitamin D level, which was 39, and his cholesterol, which is high. His triglycerides are elevated, but his thyroid and blood count are normal. His A1c is 5.6%, which he considers excellent. He is not currently prediabetic, but he has a history of prediabetes with an A1c that was previously 5.8%.    He is worried about fatty liver, noting that he does not consume alcohol. An ultrasound from last year suggested a fatty liver and an increase in gallbladder polyp size, although the gallbladder has since been removed. He is concerned about the implications of fatty liver and its potential progression.    He has made dietary changes, including eliminating salt and juice, and reducing portion sizes. Despite these efforts, he has gained weight, currently weighing 230 pounds, up from 225 pounds. He struggles with portion control despite cutting out many foods.    He is currently taking famotidine as needed, which he refills at [facility], and uses it when he anticipates eating something that may cause discomfort.    He has a history of osteopenia and is aware of the need for bone density monitoring. His bone density tests have shown osteopenia, and he is concerned about the risk of osteoporosis.     Last A1c value was 5.6% done 4/14/2025.  Fatty liver seen early in 2024 vsitis.     /78   Pulse 84   Resp 14   Ht 5' 7.25\" (1.708 m)   Wt 230 lb 12.8 oz (104.7 kg)   SpO2 97%   BMI 35.88 kg/m²  Body mass index is 35.88 kg/m².   Physical Exam  Vitals and nursing note  reviewed.   Constitutional:       General: He is not in acute distress.     Appearance: Normal appearance.   HENT:      Head: Normocephalic.   Cardiovascular:      Rate and Rhythm: Normal rate and regular rhythm.      Pulses:           Posterior tibial pulses are 2+ on the right side and 2+ on the left side.      Heart sounds: Normal heart sounds. No murmur heard.  Pulmonary:      Effort: Pulmonary effort is normal. No respiratory distress.      Breath sounds: Normal breath sounds. No wheezing.   Abdominal:      General: Bowel sounds are normal.      Palpations: Abdomen is soft.      Tenderness: There is no abdominal tenderness.   Musculoskeletal:      Cervical back: Normal range of motion.      Right lower leg: No edema.      Left lower leg: No edema.   Skin:     General: Skin is warm and dry.      Findings: No rash.   Neurological:      Mental Status: He is alert and oriented to person, place, and time.   Psychiatric:         Mood and Affect: Mood normal.         Behavior: Behavior normal.         Thought Content: Thought content normal.         Judgment: Judgment normal.        Physical Exam  MEASUREMENTS: Weight- 230.  CARDIOVASCULAR: Normal heart rate and rhythm, S1 and S2 normal without murmurs.        Results  LABS  A1c: 5.6%  Vitamin D: 39 ng/mL  Blood glucose: 98 mg/dL  Triglycerides: elevated  AST: 20 U/L  ALT: 25 U/L  Cholesterol: elevated  HDL: improved  LDL: 145 mg/dL    RADIOLOGY  Bone density: osteopenia  Abdominal ultrasound: increased gallbladder polyp, suggestion of fatty liver     Assessment & Plan  Chronic deep vein thrombosis (DVT) of popliteal vein of left lower extremity (HCC)  Seen 3.2023, on eliquis after COVID related DVT with recurrence in later 2024 after off meds for 6 months. Seen hematolgy.          Essential hypertension  BP shows good control with last BP of 130/78. Continue lifestyle changes, diet, exercise and weight loss.   4/14/2025: Potassium 4.1; Creatinine 1.04; eGFR-Cr 80  BP  Meds: lisinopril Tabs - 10 MG   ACE/ARB Adherence Excellent at 100%           Mixed hyperlipidemia  Cholesterol shows Good control. Long term heart-healthy diet and lifestyle discussed and encouraged to reduce risk of cardiovascular disease.  4/14/2025: Cholesterol, Total 235 (H); HDL Cholesterol 60 (H); Triglycerides 167 (H); LDL Cholesterol 145 (H)  No current Cholesterol medication. stable  Continue with current treatment plan          Prediabetes  4/14/2025: HgbA1C 5.6; Glucose 98 Sugar control is stable  Continue with current treatment plan  Pre-Diabetic. Not currently on Diabetic meds.          Trigeminal neuralgia  Stable, continue present management with tegretol.          Gastroesophageal reflux disease without esophagitis    Orders:    Famotidine; Take 1 tablet (40 mg total) by mouth daily.  Dispense: 90 tablet; Refill: 3    Encounter for long-term (current) use of high-risk medication    Orders:    XR DEXA BONE DENSITOMETRY (CPT=77080); Future; Expected date: 04/16/2025    Osteopenia of lumbar spine  Seen on lumbar spine films, will get bone density and work on calcium and follow-up  Orders:    XR DEXA BONE DENSITOMETRY (CPT=77080); Future; Expected date: 04/16/2025    Gallbladder polyp         Nonalcoholic fatty liver disease    Orders:    US LIVER WITH ELASTOGRAPHY(CPT=76705,06702); Future; Expected date: 04/16/2025     The 10-year ASCVD risk score (Mckinley NYE, et al., 2019) is: 14.2%    Values used to calculate the score:      Age: 64 years      Sex: Male      Is Non- : No      Diabetic: No      Tobacco smoker: No      Systolic Blood Pressure: 130 mmHg      Is BP treated: Yes      HDL Cholesterol: 60 mg/dL      Total Cholesterol: 235 mg/dL         Assessment & Plan  Metabolic Associated Fatty Liver Disease (MAFLD)  Concern for MAFLD due to metabolic factors. Previous ultrasound inconclusive. Weight loss and dietary changes can improve condition.  - Order liver ultrasound with  elastography to assess liver elasticity and diagnose fatty liver disease.    Hypercholesterolemia  LDL at 145 mg/dL with 14.9% cardiovascular risk. Prefers dietary management over medication.  - Discuss dietary modifications to manage cholesterol levels.  - Consider cholesterol-lowering medication if dietary changes are insufficient.    Osteopenia  Osteopenia present. Discussed Medicare coverage challenges for bone density tests in men.  - Order bone density test to assess bone strength and risk of fractures.    Prediabetes  A1c at 5.6%, improved from 5.8%. Not currently prediabetic but requires monitoring.  - Monitor A1c levels annually.  - Encourage lifestyle modifications to maintain or improve glycemic control.    General Health Maintenance  Engaged in dietary changes, reducing sodium intake, and eliminating certain foods. Discussed portion control and weight loss medications.  - Continue dietary modifications as advised by dietitian.  - Consider weight loss medications if dietary changes are insufficient.    Follow-up  Requires follow-up for ongoing management and test result review.  - Follow up after liver ultrasound with elastography and bone density test results.  I am having David Hernandez Jr. maintain his Vitamin D, Calcium Carbonate-Vitamin D, lisinopril, warfarin, ibuprofen, carBAMazepine, and famotidine.       Return in about 6 months (around 10/16/2025) for AWV with chonic condition follow up.

## 2025-04-17 ENCOUNTER — TELEPHONE (OUTPATIENT)
Dept: FAMILY MEDICINE CLINIC | Facility: CLINIC | Age: 65
End: 2025-04-17

## 2025-04-17 NOTE — TELEPHONE ENCOUNTER
Pt is calling because he went to schedule his bone density but they told him he's not due until October and he wants to know if he can wait or if the order needs to be changed

## 2025-04-28 ENCOUNTER — LAB ENCOUNTER (OUTPATIENT)
Dept: LAB | Age: 65
End: 2025-04-28
Attending: FAMILY MEDICINE
Payer: MEDICARE

## 2025-04-28 DIAGNOSIS — R79.89 POSITIVE D DIMER: ICD-10-CM

## 2025-04-28 DIAGNOSIS — Z86.718 HISTORY OF DEEP VEIN THROMBOSIS (DVT) OF LOWER EXTREMITY: ICD-10-CM

## 2025-04-28 LAB
INR BLD: 2.3 (ref 0.8–1.2)
PROTHROMBIN TIME: 25.5 SECONDS (ref 11.6–14.8)

## 2025-04-28 PROCEDURE — 85610 PROTHROMBIN TIME: CPT

## 2025-04-28 PROCEDURE — 36415 COLL VENOUS BLD VENIPUNCTURE: CPT

## 2025-04-29 ENCOUNTER — ANTI-COAG VISIT (OUTPATIENT)
Dept: FAMILY MEDICINE CLINIC | Facility: CLINIC | Age: 65
End: 2025-04-29

## 2025-04-29 DIAGNOSIS — Z79.01 LONG TERM (CURRENT) USE OF ANTICOAGULANTS: ICD-10-CM

## 2025-04-29 DIAGNOSIS — Z86.718 HISTORY OF RECURRENT DEEP VEIN THROMBOSIS (DVT): ICD-10-CM

## 2025-04-29 DIAGNOSIS — I82.532 CHRONIC DEEP VEIN THROMBOSIS (DVT) OF POPLITEAL VEIN OF LEFT LOWER EXTREMITY (HCC): Primary | ICD-10-CM

## 2025-04-29 NOTE — PROGRESS NOTES
Spoke with David telling him to continue on current wafarin dosing schedule of 12.5mg alternate with 10mg and repeat testing in 2 weeks per . David verbalized understanding. David said he is due for a new PT/INR order. Current order expiring 5/10/2025. PT/INR order renewed.

## 2025-05-13 ENCOUNTER — LAB ENCOUNTER (OUTPATIENT)
Dept: LAB | Age: 65
End: 2025-05-13
Attending: FAMILY MEDICINE
Payer: MEDICARE

## 2025-05-13 DIAGNOSIS — Z86.718 HISTORY OF RECURRENT DEEP VEIN THROMBOSIS (DVT): ICD-10-CM

## 2025-05-13 LAB
INR BLD: 2.67 (ref 0.8–1.2)
PROTHROMBIN TIME: 28.6 SECONDS (ref 11.6–14.8)

## 2025-05-13 PROCEDURE — 36415 COLL VENOUS BLD VENIPUNCTURE: CPT

## 2025-05-13 PROCEDURE — 85610 PROTHROMBIN TIME: CPT

## 2025-05-16 ENCOUNTER — HOSPITAL ENCOUNTER (OUTPATIENT)
Dept: ULTRASOUND IMAGING | Age: 65
Discharge: HOME | End: 2025-05-16
Attending: FAMILY MEDICINE
Payer: MEDICARE

## 2025-05-16 DIAGNOSIS — K76.0 NONALCOHOLIC FATTY LIVER DISEASE: ICD-10-CM

## 2025-05-16 PROCEDURE — 76981 USE PARENCHYMA: CPT | Performed by: FAMILY MEDICINE

## 2025-05-16 PROCEDURE — 76705 ECHO EXAM OF ABDOMEN: CPT | Performed by: FAMILY MEDICINE

## 2025-05-27 ENCOUNTER — LAB ENCOUNTER (OUTPATIENT)
Dept: LAB | Age: 65
End: 2025-05-27
Attending: FAMILY MEDICINE
Payer: MEDICARE

## 2025-05-27 ENCOUNTER — ANTI-COAG VISIT (OUTPATIENT)
Dept: FAMILY MEDICINE CLINIC | Facility: CLINIC | Age: 65
End: 2025-05-27

## 2025-05-27 DIAGNOSIS — I82.532 CHRONIC DEEP VEIN THROMBOSIS (DVT) OF POPLITEAL VEIN OF LEFT LOWER EXTREMITY (HCC): Primary | ICD-10-CM

## 2025-05-27 DIAGNOSIS — Z79.01 LONG TERM (CURRENT) USE OF ANTICOAGULANTS: ICD-10-CM

## 2025-05-27 DIAGNOSIS — Z86.718 HISTORY OF RECURRENT DEEP VEIN THROMBOSIS (DVT): ICD-10-CM

## 2025-05-27 LAB
INR BLD: 2 (ref 0.8–1.2)
PROTHROMBIN TIME: 22.8 SECONDS (ref 11.6–14.8)

## 2025-05-27 PROCEDURE — 85610 PROTHROMBIN TIME: CPT

## 2025-05-27 PROCEDURE — 36415 COLL VENOUS BLD VENIPUNCTURE: CPT

## 2025-06-10 ENCOUNTER — LAB ENCOUNTER (OUTPATIENT)
Dept: LAB | Age: 65
End: 2025-06-10
Attending: FAMILY MEDICINE
Payer: MEDICARE

## 2025-06-10 ENCOUNTER — ANTI-COAG VISIT (OUTPATIENT)
Dept: FAMILY MEDICINE CLINIC | Facility: CLINIC | Age: 65
End: 2025-06-10

## 2025-06-10 DIAGNOSIS — I82.532 CHRONIC DEEP VEIN THROMBOSIS (DVT) OF POPLITEAL VEIN OF LEFT LOWER EXTREMITY (HCC): Primary | ICD-10-CM

## 2025-06-10 DIAGNOSIS — Z86.718 HISTORY OF RECURRENT DEEP VEIN THROMBOSIS (DVT): ICD-10-CM

## 2025-06-10 DIAGNOSIS — Z79.01 LONG TERM (CURRENT) USE OF ANTICOAGULANTS: ICD-10-CM

## 2025-06-10 LAB
INR BLD: 2.33 (ref 0.8–1.2)
PROTHROMBIN TIME: 25.7 SECONDS (ref 11.6–14.8)

## 2025-06-10 PROCEDURE — 85610 PROTHROMBIN TIME: CPT

## 2025-06-10 PROCEDURE — 36415 COLL VENOUS BLD VENIPUNCTURE: CPT

## 2025-06-11 NOTE — PROGRESS NOTES
Spoke with David telling him to continue current warfarin dose of 12.5 mg alternating 10mg and repeat testing in 2 weeks per . David verbalized understanding.

## 2025-06-24 ENCOUNTER — ANTI-COAG VISIT (OUTPATIENT)
Dept: FAMILY MEDICINE CLINIC | Facility: CLINIC | Age: 65
End: 2025-06-24

## 2025-06-24 ENCOUNTER — LAB ENCOUNTER (OUTPATIENT)
Dept: LAB | Age: 65
End: 2025-06-24
Attending: FAMILY MEDICINE
Payer: MEDICARE

## 2025-06-24 DIAGNOSIS — Z79.01 LONG TERM (CURRENT) USE OF ANTICOAGULANTS: ICD-10-CM

## 2025-06-24 DIAGNOSIS — I82.532 CHRONIC DEEP VEIN THROMBOSIS (DVT) OF POPLITEAL VEIN OF LEFT LOWER EXTREMITY (HCC): Primary | ICD-10-CM

## 2025-06-24 DIAGNOSIS — Z86.718 HISTORY OF RECURRENT DEEP VEIN THROMBOSIS (DVT): ICD-10-CM

## 2025-06-24 LAB
INR BLD: 2.6 (ref 0.8–1.2)
PROTHROMBIN TIME: 28 SECONDS (ref 11.6–14.8)

## 2025-06-24 PROCEDURE — 36415 COLL VENOUS BLD VENIPUNCTURE: CPT

## 2025-06-24 PROCEDURE — 85610 PROTHROMBIN TIME: CPT

## 2025-07-08 ENCOUNTER — LAB ENCOUNTER (OUTPATIENT)
Dept: LAB | Age: 65
End: 2025-07-08
Attending: FAMILY MEDICINE
Payer: MEDICARE

## 2025-07-08 ENCOUNTER — ANTI-COAG VISIT (OUTPATIENT)
Dept: FAMILY MEDICINE CLINIC | Facility: CLINIC | Age: 65
End: 2025-07-08

## 2025-07-08 DIAGNOSIS — I82.532 CHRONIC DEEP VEIN THROMBOSIS (DVT) OF POPLITEAL VEIN OF LEFT LOWER EXTREMITY (HCC): Primary | ICD-10-CM

## 2025-07-08 DIAGNOSIS — Z86.718 HISTORY OF RECURRENT DEEP VEIN THROMBOSIS (DVT): ICD-10-CM

## 2025-07-08 DIAGNOSIS — Z79.01 LONG TERM (CURRENT) USE OF ANTICOAGULANTS: ICD-10-CM

## 2025-07-08 LAB
INR BLD: 2.32 (ref 0.8–1.2)
PROTHROMBIN TIME: 25.6 SECONDS (ref 11.6–14.8)

## 2025-07-08 PROCEDURE — 36415 COLL VENOUS BLD VENIPUNCTURE: CPT

## 2025-07-08 PROCEDURE — 85610 PROTHROMBIN TIME: CPT

## 2025-07-10 NOTE — PROGRESS NOTES
Pt contacted and given coumadin dosing and retesting instructions. Pt verbalized understanding.

## 2025-07-22 ENCOUNTER — LAB ENCOUNTER (OUTPATIENT)
Dept: LAB | Age: 65
End: 2025-07-22
Attending: FAMILY MEDICINE
Payer: MEDICARE

## 2025-07-22 DIAGNOSIS — Z86.718 HISTORY OF RECURRENT DEEP VEIN THROMBOSIS (DVT): ICD-10-CM

## 2025-07-22 LAB
INR BLD: 2.6 (ref 0.8–1.2)
PROTHROMBIN TIME: 28 SECONDS (ref 11.6–14.8)

## 2025-07-22 PROCEDURE — 85610 PROTHROMBIN TIME: CPT

## 2025-07-22 PROCEDURE — 36415 COLL VENOUS BLD VENIPUNCTURE: CPT

## 2025-07-25 ENCOUNTER — ANTI-COAG VISIT (OUTPATIENT)
Dept: FAMILY MEDICINE CLINIC | Facility: CLINIC | Age: 65
End: 2025-07-25

## 2025-07-25 ENCOUNTER — RESULTS FOLLOW-UP (OUTPATIENT)
Dept: FAMILY MEDICINE CLINIC | Facility: CLINIC | Age: 65
End: 2025-07-25

## 2025-07-25 DIAGNOSIS — Z86.718 HISTORY OF RECURRENT DEEP VEIN THROMBOSIS (DVT): ICD-10-CM

## 2025-07-25 DIAGNOSIS — I82.532 CHRONIC DEEP VEIN THROMBOSIS (DVT) OF POPLITEAL VEIN OF LEFT LOWER EXTREMITY (HCC): Primary | ICD-10-CM

## 2025-07-25 DIAGNOSIS — Z79.01 LONG TERM (CURRENT) USE OF ANTICOAGULANTS: ICD-10-CM

## 2025-07-25 NOTE — PROGRESS NOTES
INR 2.6-on 12.5 mg then 10 mg repeating every 10 days. To recheck in 2 weeks per Maikel result note from today.   Spoke to patient, Patient verbalized understanding.

## 2025-07-29 DIAGNOSIS — I10 ESSENTIAL HYPERTENSION: ICD-10-CM

## 2025-07-31 RX ORDER — LISINOPRIL 10 MG/1
10 TABLET ORAL DAILY
Qty: 90 TABLET | Refills: 3 | Status: SHIPPED | OUTPATIENT
Start: 2025-07-31

## 2025-08-05 ENCOUNTER — LAB ENCOUNTER (OUTPATIENT)
Dept: LAB | Age: 65
End: 2025-08-05
Attending: FAMILY MEDICINE

## 2025-08-05 ENCOUNTER — ANTI-COAG VISIT (OUTPATIENT)
Dept: FAMILY MEDICINE CLINIC | Facility: CLINIC | Age: 65
End: 2025-08-05

## 2025-08-05 DIAGNOSIS — I82.532 CHRONIC DEEP VEIN THROMBOSIS (DVT) OF POPLITEAL VEIN OF LEFT LOWER EXTREMITY (HCC): Primary | ICD-10-CM

## 2025-08-05 DIAGNOSIS — Z79.01 LONG TERM (CURRENT) USE OF ANTICOAGULANTS: ICD-10-CM

## 2025-08-05 DIAGNOSIS — Z86.718 HISTORY OF RECURRENT DEEP VEIN THROMBOSIS (DVT): ICD-10-CM

## 2025-08-05 LAB
INR BLD: 2.4 (ref 0.8–1.2)
PROTHROMBIN TIME: 26.4 SECONDS (ref 11.6–14.8)

## 2025-08-05 PROCEDURE — 36415 COLL VENOUS BLD VENIPUNCTURE: CPT

## 2025-08-05 PROCEDURE — 85610 PROTHROMBIN TIME: CPT

## 2025-08-18 ENCOUNTER — OFFICE VISIT (OUTPATIENT)
Dept: SURGERY | Facility: CLINIC | Age: 65
End: 2025-08-18

## 2025-08-18 DIAGNOSIS — N35.819 OTHER STRICTURE OF URETHRA IN MALE: Primary | ICD-10-CM

## 2025-08-18 LAB
APPEARANCE: CLEAR
GLUCOSE (URINE DIPSTICK): NEGATIVE MG/DL
LEUKOCYTES: NEGATIVE
MULTISTIX LOT#: ABNORMAL NUMERIC
NITRITE, URINE: NEGATIVE
PH, URINE: 5.5 (ref 4.5–8)
PROTEIN (URINE DIPSTICK): 100 MG/DL
SPECIFIC GRAVITY: 1.03 (ref 1–1.03)
URINE-COLOR: YELLOW
UROBILINOGEN,SEMI-QN: 0.2 MG/DL (ref 0–1.9)

## 2025-08-18 PROCEDURE — 81002 URINALYSIS NONAUTO W/O SCOPE: CPT | Performed by: SURGERY

## 2025-08-18 PROCEDURE — 99214 OFFICE O/P EST MOD 30 MIN: CPT | Performed by: SURGERY

## 2025-08-19 ENCOUNTER — LAB ENCOUNTER (OUTPATIENT)
Dept: LAB | Age: 65
End: 2025-08-19
Attending: FAMILY MEDICINE

## 2025-08-19 ENCOUNTER — ANTI-COAG VISIT (OUTPATIENT)
Dept: FAMILY MEDICINE CLINIC | Facility: CLINIC | Age: 65
End: 2025-08-19

## 2025-08-19 DIAGNOSIS — I82.532 CHRONIC DEEP VEIN THROMBOSIS (DVT) OF POPLITEAL VEIN OF LEFT LOWER EXTREMITY (HCC): Primary | ICD-10-CM

## 2025-08-19 DIAGNOSIS — Z79.01 LONG TERM (CURRENT) USE OF ANTICOAGULANTS: ICD-10-CM

## 2025-08-19 DIAGNOSIS — Z86.718 HISTORY OF RECURRENT DEEP VEIN THROMBOSIS (DVT): ICD-10-CM

## 2025-08-19 LAB
INR BLD: 2.4 (ref 0.8–1.2)
PROTHROMBIN TIME: 26 SECONDS (ref 11.6–14.8)

## 2025-08-19 PROCEDURE — 36415 COLL VENOUS BLD VENIPUNCTURE: CPT

## 2025-08-19 PROCEDURE — 85610 PROTHROMBIN TIME: CPT

## (undated) DIAGNOSIS — M17.0 PRIMARY OSTEOARTHRITIS OF BOTH KNEES: ICD-10-CM

## (undated) DIAGNOSIS — R97.20 ELEVATED PSA, LESS THAN 10 NG/ML: ICD-10-CM

## (undated) DIAGNOSIS — R73.03 PREDIABETES: Primary | ICD-10-CM

## (undated) DIAGNOSIS — E55.9 VITAMIN D DEFICIENCY: ICD-10-CM

## (undated) DIAGNOSIS — E78.2 MIXED HYPERLIPIDEMIA: ICD-10-CM

## (undated) DEVICE — ADHESIVE SKIN TOP FOR WND CLSR DERMBND ADV

## (undated) DEVICE — CLIP APPLIER WITH CLIP LOGIC TECHNOLOGY: Brand: ENDO CLIP III

## (undated) DEVICE — TROCAR: Brand: KII® SLEEVE

## (undated) DEVICE — GLOVE SUR 8 SENSICARE PI PIP CRM PWD F

## (undated) DEVICE — #11 STERILE BLADE: Brand: POLYMER COATED BLADES

## (undated) DEVICE — POUCH SPECIMEN WIRE 6X3 250ML

## (undated) DEVICE — L-HOOK CAUTERY PROBE TIP, DISPOSABLE: Brand: RENEW

## (undated) DEVICE — SUT PDS II 0 L27IN ABSRB VLT L26MM CT-2

## (undated) DEVICE — DALE ABDOMINAL BINDER, 12" WIDE, STRETCHES TO FIT 30"-45", 1 PER BOX.: Brand: DALE ABDOMINAL BINDER

## (undated) DEVICE — LAP CHOLE/APPY CDS-LF: Brand: MEDLINE INDUSTRIES, INC.

## (undated) DEVICE — TUR/ENDOSCOPIC CABLE, 10' (3.05 M): Brand: CONMED

## (undated) DEVICE — SLEEVE COMPR MD KNEE LEN SGL USE KENDALL SCD

## (undated) DEVICE — GLOVE,SURG,SENSICARE SLT,LF,PF,7: Brand: MEDLINE

## (undated) DEVICE — SURGICEL SNOW HEMOSTAT 4X4IN ABSORBABLE

## (undated) DEVICE — TRADITIONAL MARYLAND DISSECTOR TIP, DISPOSABLE: Brand: RENEW

## (undated) DEVICE — SYRINGE MED 20ML STD CLR PLAS LL TIP N CTRL

## (undated) DEVICE — 40580 - THE PINK PAD - ADVANCED TRENDELENBURG POSITIONING KIT: Brand: 40580 - THE PINK PAD - ADVANCED TRENDELENBURG POSITIONING KIT

## (undated) DEVICE — BAG DRNGE 2000ML URIN INF CTRL ANTIREFLX

## (undated) DEVICE — CYSTO CDS-LF: Brand: MEDLINE INDUSTRIES, INC.

## (undated) DEVICE — SPATULA CAUTERY PROBE TIP, DISPOSABLE: Brand: RENEW

## (undated) DEVICE — NITINOL WIRE WITH HYDROPHILIC TIP: Brand: SENSOR

## (undated) DEVICE — COVER,LIGHT,CAMERA,HARD,1/PK,STRL: Brand: MEDLINE

## (undated) DEVICE — SYRINGE MED 10ML LL TIP W/O SFTY DISP

## (undated) DEVICE — KENDALL SCD EXPRESS SLEEVES, KNEE LENGTH, MEDIUM: Brand: KENDALL SCD

## (undated) DEVICE — TROCAR: Brand: KII SHIELDED BLADED ACCESS SYSTEM

## (undated) DEVICE — SOL H2O 1000ML BTL

## (undated) DEVICE — COBRA TOOTH GRASPER TIP, DISPOSABLE: Brand: RENEW

## (undated) DEVICE — STERILE POLYISOPRENE POWDER-FREE SURGICAL GLOVES: Brand: PROTEXIS

## (undated) DEVICE — Device

## (undated) DEVICE — LAPAROVUE VISIBILITY SYSTEM LAPAROSCOPIC SOLUTIONS: Brand: LAPAROVUE

## (undated) DEVICE — SOLUTION IRRIG 1000ML 0.9% NACL USP BTL

## (undated) DEVICE — Device: Brand: SUTURE PASSOR PRO

## (undated) DEVICE — ENDOCUT SCISSOR TIP, DISPOSABLE: Brand: RENEW

## (undated) DEVICE — TROCAR: Brand: KII FIOS FIRST ENTRY

## (undated) DEVICE — PACK PBDS CYSTOSCOPY

## (undated) DEVICE — TRAY CATH 16FR F INCL BARDX IC COMPLT CARE

## (undated) DEVICE — GRABBER GRASPER TIP, DISPOSABLE: Brand: RENEW

## (undated) DEVICE — ENDOPATH ULTRA VERESS INSUFFLATION NEEDLES WITH LUER LOCK CONNECTORS: Brand: ENDOPATH

## (undated) DEVICE — SUT MCRYL 4-0 18IN PS-2 ABSRB UD 19MM 3/8 CIR

## (undated) NOTE — Clinical Note
Patient is requesting letter for social security disability due to him having to retire. Patient would like note written for Ruth Vail at [address] [facility]. Main telephone # [phone] # [phone]. [facility].

## (undated) NOTE — LETTER
OSR/YISEL Notification    Patient Name: Beau Hernandez Jr.-Age / Sex: 1960-A: 64 y  male  Medical Records: [id number] [facility]: [id number]    Surgeon(s):  Surgeon(s):  Erik Avendano MD   Procedure: CYSTOSCOPY URETHRAL DILATION    Anesthesia Type: General Surgery Date: 2025    During the pre-operative screening process using the ST[employer]-Bang questionnaire, the patient listed above was identified as being at high risk for obstructive sleep apnea.    If you feel it is appropriate to schedule a sleep study, the [facility] will give priority to patients scheduled for procedures to minimize surgical delays.     If a sleep study is completed prior to surgery, please fax the results/plan of care to Pre-Admission Testing ([phone]) as this information will be utilized in clinical decision-making regarding the patient's upcoming surgery.    Thank you for your assistance in helping us provide a safe, seamless and personal experience for your patient.    Liam Kwong MD  , Department of Anesthesia

## (undated) NOTE — LETTER
[facility] Testing Department  Phone: [phone]  OUTSIDE TESTING RESULT REQUEST      TO:   DR. Arlette Graham Today's Date: 6/8/20    FAX #: [phone]     IMPORTANT: FOR YOUR IMMEDIATE ATTENTION  Please FAX all test results listed below to: [phone]

## (undated) NOTE — LETTER
Skyler Alejandre M.D., F.A.C.S.    Surgical Clearance Needed    Date: 10/2/2024                                                                       From: Gifty Tate: Dr. Morales                                                                                Fax:     RE: Surgical Clearance               # of Pages: 1        Patient Name: Beau Hernandez Jr.    Patient YOB: 1960    Consult For: Anti coagulation management    Surgery: LAPAROSCOPIC CHOLECYSTECTOMY POSSIBLE OPEN WITH INJECTION OF INDOCYANINE GREEN (ICG)    Date of Surgery: 11/22/2024 AT [facility]        This facsimile transmission is provided for your internal use only. If the reader of this message is not the intended recipient, you are hereby notified that you have received this document in error, and that any review, dissemination, distribution, or copying of this message is strictly prohibited. If you have received this communication in error, please notify us immediately by telephone and return the original message to us by mail.

## (undated) NOTE — LETTER
10/02/24    Patient: Beau Hernandez Jr.  : 1960 Visit date: 10/2/2024    Dear  Jose Morales MD    Thank you for referring Beau Hernandez Jr. to my practice.  Please find my assessment and plan below.     Assessment   1. Gallbladder polyp          Plan     The patient will be scheduled for laparoscopic cholecystectomy with ICG imaging.    The ramon-operative care plan was discussed with the patient, who voices understanding.  Activity and lifting recommendations were discussed in length.     The risks, benefits, and alternatives to the procedure were explained to the patient.  The risks explained include, but are not limited to, bleeding, infection, pain wound complications, recurrence, incorrect diagnosis, injury to adjacent organs and structures. We also discussed the possibile need for further therapeutic, diagnostic, or surgical intervention.  The patient voiced understanding, and after all questions were answered to the patient's satisfaction, the patient provided willing and informed consent to proceed.      Sincerely,       Skyler Alejandre MD   CC:   No Recipients

## (undated) NOTE — MR AVS SNAPSHOT
[facility]  [place],  [address] [place] [address] [phone]               Thank you for choosing us for your health care visit with Los Cerda MD.  We are glad to serve you and happy to provide you with this summa You can access your MyChart to more actively manage your health care and view more details from this visit by going to [email or web address].   If you've recently had a stay at the Hospital you can access your discharge instructions in [address] by raymond You don’t need to join a gym. Home exercises work great.  Put more priority on exercise in your life                    Visit [facility] online at  [email or web address]

## (undated) NOTE — LETTER
Patient Name: Beau Hernandez Jr.  Surgery Date: 11/22/2024  Medical Record: [id number]   CSN: [id number]      Surgeon(s):  Skyler Alejandre MD  Consent Procedure: LAPAROSCOPIC CHOLECYSTECTOMY POSSIBLE OPEN WITH INJECTION OF INDOCYANINE GREEN (ICG)  Anesthesia Type: General    Request for:  Anticoagulation Management    Requested by Surgeon: Skyler Alejandre MD    Surgical Date: 11/22/2024    Please fax the note to the Pre-Admission Testing department.  Thank you.

## (undated) NOTE — ED AVS SNAPSHOT
[facility]    [address] [place] [address]    Phone:  [phone]    Fax:  [phone]           Yuridia Gil.    MRN: [id number]    Department:  [facility]   Date of Visit: - [address], [phone], [phone]  [address], [address] [id number]     Phone:  [phone]    - Cefadroxil 500 MG Caps  - ibuprofen 600 MG Tabs              Discharge Instructions       Diet and activity as primary care or a specialist physician for a follow-up visit, please tell this physician (or your personal doctor if your instructions are to return to your personal doctor) about any new or lasting problems.  The primary care or specialist physician will s - If you are a smoker or have smoked in the last 12 months, we encourage you to explore options for quitting.     - If you have concerns related to behavioral health issues or thoughts of harming yourself, contact [facility] a

## (undated) NOTE — LETTER
[address]  [address]  Authorization for Surgical Operation and Procedure     Date:___________                                                                                                         Time:__________  I hereby authorize Surgeon(s):  Skyler Alejandre MD, my physician and his/her assistants (if applicable), which may include medical students, residents, and/or fellows, to perform the following surgical operation/ procedure and administer such anesthesia as may be determined necessary by my physician:  Operation/Procedure name (s) Procedure(s):  LAPAROSCOPIC CHOLECYSTECTOMY POSSIBLE OPEN WITH INJECTION OF INDOCYANINE GREEN on Beau Mary Wang   2.   I recognize that during the surgical operation/procedure, unforeseen conditions may necessitate additional or different procedures than those listed above.  I, therefore, further authorize and request that the above-named surgeon, assistants, or designees perform such procedures as are, in their judgment, necessary and desirable.    3.   My surgeon/physician has discussed prior to my surgery the potential benefits, risks and side effects of this procedure; the likelihood of achieving goals; and potential problems that might occur during recuperation.  They also discussed reasonable alternatives to the procedure, including risks, benefits, and side effects related to the alternatives and risks related to not receiving this procedure.  I have had all my questions answered and I acknowledge that no guarantee has been made as to the result that may be obtained.    4.   Should the need arise during my operation/procedure, which includes change of level of care prior to discharge, I also consent to the administration of blood and/or blood products.  Further, I understand that despite careful testing and screening of blood or blood products by collecting agencies, I may still be subject to ill effects as a result of  receiving a blood transfusion and/or blood products.  The following are some, but not all, of the potential risks that can occur: fever and allergic reactions, hemolytic reactions, transmission of diseases such as Hepatitis, AIDS and Cytomegalovirus (CMV) and fluid overload.  In the event that I wish to have an autologous transfusion of my own blood, or a directed donor transfusion, I will discuss this with my physician.  Check only if Refusing Blood or Blood Products  I understand refusal of blood or blood products as deemed necessary by my physician may have serious consequences to my condition to include possible death. I hereby assume responsibility for my refusal and release the hospital, its personnel, and my physicians from any responsibility for the consequences of my refusal.          o  Refuse      5.   I authorize the use of any specimen, organs, tissues, body parts or foreign objects that may be removed from my body during the operation/procedure for diagnosis, research or teaching purposes and their subsequent disposal by hospital authorities.  I also authorize the release of specimen test results and/or written reports to my treating physician on the hospital medical staff or other referring or consulting physicians involved in my care, at the discretion of the Pathologist or my treating physician.    6.   I consent to the photographing or videotaping of the operations or procedures to be performed, including appropriate portions of my body for medical, scientific, or educational purposes, provided my identity is not revealed by the pictures or by descriptive texts accompanying them.  If the procedure has been photographed/videotaped, the surgeon will obtain the original picture, image, videotape or CD.  The hospital will not be responsible for storage, release or maintenance of the picture, image, tape or CD.    7.   I consent to the presence of a  or observers in the operating room  as deemed necessary by my physician or their designees.    8.   I recognize that in the event my procedure results in extended X-Ray/fluoroscopy time, I may develop a skin reaction.    9. If I have a Do Not Attempt Resuscitation (DNAR) order in place, that status will be suspended while in the operating room, procedural suite, and during the recovery period unless otherwise explicitly stated by me (or a person authorized to consent on my behalf). The surgeon or my attending physician will determine when the applicable recovery period ends for purposes of reinstating the DNAR order.  10. Patients having a sterilization procedure: I understand that if the procedure is successful the results will be permanent and it will therefore be impossible for me to inseminate, conceive, or bear children.  I also understand that the procedure is intended to result in sterility, although the result has not been guaranteed.   11. I acknowledge that my physician has explained sedation/analgesia administration to me including the risk and benefits I consent to the administration of sedation/analgesia as may be necessary or desirable in the judgment of my physician.    I CERTIFY THAT I HAVE READ AND FULLY UNDERSTAND THE ABOVE CONSENT TO OPERATION and/or OTHER PROCEDURE.    _________________________________________  __________________________________  Signature of Patient     Signature of Responsible Person         ___________________________________         Printed Name of Responsible Person           _________________________________                 Relationship to Patient  _________________________________________  ______________________________  Signature of Witness          Date  Time      Patient Name: Beau Hernandez      : 1960                 Printed: 2024     Medical Record #: [id number]                     Page 1 of 2                                    [address]   [id number]    Consent for Anesthesia    IBeau Jr. agree to be cared for by an anesthesiologist, who is specially trained to monitor me and give me medicine to put me to sleep or keep me comfortable during my procedure    I understand that my anesthesiologist is not an employee or agent of [facility] or [employer]. He or she works for [employer].    As the patient asking for anesthesia services, I agree to:  Allow the anesthesiologist (anesthesia doctor) to give me medicine and do additional procedures as necessary. Some examples are: Starting or using an “IV” to give me medicine, fluids or blood during my procedure, and having a breathing tube placed to help me breathe when I’m asleep (intubation). In the event that my heart stops working properly, I understand that my anesthesiologist will make every effort to sustain my life, unless otherwise directed by [facility] Do Not Resuscitate documents.  Tell my anesthesia doctor before my procedure:  If I am pregnant.  The last time that I ate or drank.  All of the medicines I take (including prescriptions, herbal supplements, and pills I can buy without a prescription (including street drugs/illegal medications). Failure to inform my anesthesiologist about these medicines may increase my risk of anesthetic complications.  If I am allergic to anything or have had a reaction to anesthesia before.  I understand how the anesthesia medicine will help me (benefits).  I understand that with any type of anesthesia medicine there are risks:  The most common risks are: nausea, vomiting, sore throat, muscle soreness, damage to my eyes, mouth, or teeth (from breathing tube placement).  Rare risks include: remembering what happened during my procedure, allergic reactions to medications, injury to my airway, heart, lungs, vision, nerves, or muscles and in extremely rare instances death.  My doctor has explained to me other choices  available to me for my care (alternatives).  Pregnant Patients (“epidural”):  I understand that the risks of having an epidural (medicine given into my back to help control pain during labor), include itching, low blood pressure, difficulty urinating, headache or slowing of the baby’s heart. Very rare risks include infection, bleeding, seizure, irregular heart rhythms and nerve injury.  Regional Anesthesia (“spinal”, “epidural”, & “nerve blocks”):  I understand that rare but potential complications include headache, bleeding, infection, seizure, irregular heart rhythms, and nerve injury.    I can change my mind about having anesthesia services at any time before I get the medicine.    _____________________________________________________________________________  Patient (or Representative) Signature/Relationship to Patient  Date   Time    _____________________________________________________________________________   Name (if used)    Language/Organization   Time    _____________________________________________________________________________  Anesthesiologist Signature     Date   Time  I have discussed the procedure and information above with the patient (or patient’s representative) and answered their questions. The patient or their representative has agreed to have anesthesia services.    _____________________________________________________________________________  Witness        Date   Time  I have verified that the signature is that of the patient or patient’s representative, and that it was signed before the procedure  Patient Name: Beau Hernandez Jr.     : 1960                 Printed: 2024     Medical Record #: [id number]                     Page 2 of 2

## (undated) NOTE — LETTER
[address] [facility], [facility], [address] [place] [address] [place]          Date: 2020     Patient Name: Grady Moreira.    :   1960   Date of Surgery:  2020      Dear Dr Emi Carrington

## (undated) NOTE — LETTER
OSR/YISEL Notification    Patient Name: Beau Hernandez Jr.-Age / Sex: 1960-A: 64 y  male  Medical Records: [id number] [facility]: [id number]    Surgeon(s):  Surgeon(s):  Skyler Alejandre MD   Procedure: LAPAROSCOPIC CHOLECYSTECTOMY POSSIBLE OPEN WITH INJECTION OF INDOCYANINE GREEN (ICG)    Anesthesia Type: General Surgery Date: 2024    During the pre-operative screening process using the [employer] questionnaire, the patient listed above was identified as being at high risk for obstructive sleep apnea.    If you feel it is appropriate to schedule a sleep study, the [facility] will give priority to patients scheduled for procedures to minimize surgical delays.     If a sleep study is completed prior to surgery, please fax the results/plan of care to Pre-Admission Testing ([phone]) as this information will be utilized in clinical decision-making regarding the patient's upcoming surgery.    Thank you for your assistance in helping us provide a safe, seamless and personal experience for your patient.    Liam Kwong MD  , Department of Anesthesia

## (undated) NOTE — LETTER
ASTHMA ACTION PLAN for Joelle Spencer.      : 1960     Date: 2020  Provider:  Sabiha Gonzalez MD  Phone for doctor or clinic: [facility], [address] [place] [address], [address] [place] [address] S[place]garrettisael  [facility] [phone]